# Patient Record
Sex: FEMALE | Race: WHITE | Employment: OTHER | URBAN - METROPOLITAN AREA
[De-identification: names, ages, dates, MRNs, and addresses within clinical notes are randomized per-mention and may not be internally consistent; named-entity substitution may affect disease eponyms.]

---

## 2017-01-03 ENCOUNTER — TELEPHONE (OUTPATIENT)
Dept: HEMATOLOGY/ONCOLOGY | Facility: HOSPITAL | Age: 67
End: 2017-01-03

## 2017-01-03 NOTE — TELEPHONE ENCOUNTER
Pt is having a scan on Friday. She has a follow up and chemo appt on Tuesday   She has an out of town gathering the following weekend. She would like to delay her chemo ideally 2 weeks but at least 1 week if that is OK with Dr. Evon Berry.

## 2017-01-06 ENCOUNTER — HOSPITAL ENCOUNTER (OUTPATIENT)
Dept: CT IMAGING | Facility: HOSPITAL | Age: 67
Discharge: HOME | End: 2017-01-06
Attending: INTERNAL MEDICINE
Payer: COMMERCIAL

## 2017-01-06 DIAGNOSIS — C78.7 SECONDARY ADENOCARCINOMA OF LIVER (HCC): ICD-10-CM

## 2017-01-06 DIAGNOSIS — C25.0 MALIGNANT NEOPLASM OF HEAD OF PANCREAS (HCC): ICD-10-CM

## 2017-01-06 PROCEDURE — 74177 CT ABD & PELVIS W/CONTRAST: CPT

## 2017-01-06 PROCEDURE — 71260 CT THORAX DX C+: CPT

## 2017-01-10 ENCOUNTER — OFFICE VISIT (OUTPATIENT)
Dept: HEMATOLOGY/ONCOLOGY | Facility: HOSPITAL | Age: 67
End: 2017-01-10
Attending: INTERNAL MEDICINE
Payer: COMMERCIAL

## 2017-01-10 VITALS
DIASTOLIC BLOOD PRESSURE: 92 MMHG | HEIGHT: 66.58 IN | HEART RATE: 88 BPM | TEMPERATURE: 97 F | SYSTOLIC BLOOD PRESSURE: 154 MMHG | RESPIRATION RATE: 18 BRPM | BODY MASS INDEX: 24.83 KG/M2 | WEIGHT: 156.38 LBS

## 2017-01-10 DIAGNOSIS — C78.7 SECONDARY ADENOCARCINOMA OF LIVER (HCC): ICD-10-CM

## 2017-01-10 DIAGNOSIS — C25.0 MALIGNANT NEOPLASM OF HEAD OF PANCREAS (HCC): Primary | ICD-10-CM

## 2017-01-10 LAB
ALBUMIN SERPL-MCNC: 3.5 G/DL (ref 3.5–4.8)
ALP LIVER SERPL-CCNC: 175 U/L (ref 55–142)
ALT SERPL-CCNC: 30 U/L (ref 14–54)
AST SERPL-CCNC: 28 U/L (ref 15–41)
BASOPHILS # BLD AUTO: 0.04 X10(3) UL (ref 0–0.1)
BASOPHILS NFR BLD AUTO: 0.5 %
BILIRUB SERPL-MCNC: 0.3 MG/DL (ref 0.1–2)
BUN BLD-MCNC: 13 MG/DL (ref 8–20)
CALCIUM BLD-MCNC: 8.7 MG/DL (ref 8.3–10.3)
CARBOHYDRATE AG 19-9: 240.1 U/ML (ref 1.2–35)
CEA: 7.3 NG/ML (ref 0.5–5)
CHLORIDE: 105 MMOL/L (ref 101–111)
CO2: 26 MMOL/L (ref 22–32)
CREAT BLD-MCNC: 0.73 MG/DL (ref 0.55–1.02)
EOSINOPHIL # BLD AUTO: 0.03 X10(3) UL (ref 0–0.3)
EOSINOPHIL NFR BLD AUTO: 0.4 %
ERYTHROCYTE [DISTWIDTH] IN BLOOD BY AUTOMATED COUNT: 13.1 % (ref 11.5–16)
GLUCOSE BLD-MCNC: 125 MG/DL (ref 70–99)
HCT VFR BLD AUTO: 40.2 % (ref 34–50)
HGB BLD-MCNC: 13.3 G/DL (ref 12–16)
IMMATURE GRANULOCYTE COUNT: 0.05 X10(3) UL (ref 0–1)
IMMATURE GRANULOCYTE RATIO %: 0.6 %
LYMPHOCYTES # BLD AUTO: 2.18 X10(3) UL (ref 0.9–4)
LYMPHOCYTES NFR BLD AUTO: 26.7 %
M PROTEIN MFR SERPL ELPH: 7.2 G/DL (ref 6.1–8.3)
MCH RBC QN AUTO: 33.1 PG (ref 27–33.2)
MCHC RBC AUTO-ENTMCNC: 33.1 G/DL (ref 31–37)
MCV RBC AUTO: 100 FL (ref 81–100)
MONOCYTES # BLD AUTO: 0.56 X10(3) UL (ref 0.1–0.6)
MONOCYTES NFR BLD AUTO: 6.9 %
NEUTROPHIL ABS PRELIM: 5.31 X10 (3) UL (ref 1.3–6.7)
NEUTROPHILS # BLD AUTO: 5.31 X10(3) UL (ref 1.3–6.7)
NEUTROPHILS NFR BLD AUTO: 64.9 %
PLATELET # BLD AUTO: 153 10(3)UL (ref 150–450)
POTASSIUM SERPL-SCNC: 3.5 MMOL/L (ref 3.6–5.1)
RBC # BLD AUTO: 4.02 X10(6)UL (ref 3.8–5.1)
RED CELL DISTRIBUTION WIDTH-SD: 48.5 FL (ref 35.1–46.3)
SODIUM SERPL-SCNC: 139 MMOL/L (ref 136–144)
WBC # BLD AUTO: 8.2 X10(3) UL (ref 4–13)

## 2017-01-10 PROCEDURE — 86301 IMMUNOASSAY TUMOR CA 19-9: CPT

## 2017-01-10 PROCEDURE — 99214 OFFICE O/P EST MOD 30 MIN: CPT | Performed by: INTERNAL MEDICINE

## 2017-01-10 PROCEDURE — 85025 COMPLETE CBC W/AUTO DIFF WBC: CPT

## 2017-01-10 PROCEDURE — 80053 COMPREHEN METABOLIC PANEL: CPT

## 2017-01-10 PROCEDURE — 82378 CARCINOEMBRYONIC ANTIGEN: CPT

## 2017-01-10 PROCEDURE — 36591 DRAW BLOOD OFF VENOUS DEVICE: CPT

## 2017-01-10 RX ORDER — SODIUM CHLORIDE 0.9 % (FLUSH) 0.9 %
10 SYRINGE (ML) INJECTION ONCE
Status: COMPLETED | OUTPATIENT
Start: 2017-01-10 | End: 2017-01-10

## 2017-01-10 RX ADMIN — SODIUM CHLORIDE 0.9 % (FLUSH) 10 ML: 0.9 % SYRINGE (ML) INJECTION at 13:05:00

## 2017-01-10 NOTE — PROGRESS NOTES
Patient is here for MD visit only. Delaying chemo one week because pt is going out of town. Pt had a little more looser stools and stomach upset after last chemo tx. Mild fatigue but tolerable. Appetite is good. No current nausea or vomiting.  No mouth sore

## 2017-01-16 ENCOUNTER — DIETICIAN VISIT (OUTPATIENT)
Dept: HEMATOLOGY/ONCOLOGY | Facility: HOSPITAL | Age: 67
End: 2017-01-16

## 2017-01-16 NOTE — PROGRESS NOTES
Nutrition screen complete as triggered by [facility] dx of metastatic pancreatic cancer. Chart reviewed. Pt appears at a moderate to high nutrition risk at present. Noted wt loss of ~ 30 lbs from 2/10/16 to 12/1/16.  Noted pt had been receiving tx at [facility]

## 2017-01-20 ENCOUNTER — OFFICE VISIT (OUTPATIENT)
Dept: HEMATOLOGY/ONCOLOGY | Facility: HOSPITAL | Age: 67
End: 2017-01-20
Attending: INTERNAL MEDICINE
Payer: COMMERCIAL

## 2017-01-20 VITALS
WEIGHT: 161.63 LBS | OXYGEN SATURATION: 98 % | HEART RATE: 79 BPM | HEIGHT: 66.58 IN | DIASTOLIC BLOOD PRESSURE: 89 MMHG | RESPIRATION RATE: 20 BRPM | BODY MASS INDEX: 25.67 KG/M2 | TEMPERATURE: 98 F | SYSTOLIC BLOOD PRESSURE: 152 MMHG

## 2017-01-20 DIAGNOSIS — C25.0 MALIGNANT NEOPLASM OF HEAD OF PANCREAS (HCC): Primary | ICD-10-CM

## 2017-01-20 LAB
ALBUMIN SERPL-MCNC: 3.2 G/DL (ref 3.5–4.8)
ALP LIVER SERPL-CCNC: 153 U/L (ref 55–142)
ALT SERPL-CCNC: 40 U/L (ref 14–54)
AST SERPL-CCNC: 37 U/L (ref 15–41)
BASOPHILS # BLD AUTO: 0.06 X10(3) UL (ref 0–0.1)
BASOPHILS NFR BLD AUTO: 1.9 %
BILIRUB SERPL-MCNC: 0.2 MG/DL (ref 0.1–2)
BUN BLD-MCNC: 18 MG/DL (ref 8–20)
CALCIUM BLD-MCNC: 8.8 MG/DL (ref 8.3–10.3)
CARBOHYDRATE AG 19-9: 215.9 U/ML (ref 1.2–35)
CEA: 5.3 NG/ML (ref 0.5–5)
CHLORIDE: 109 MMOL/L (ref 101–111)
CO2: 24 MMOL/L (ref 22–32)
CREAT BLD-MCNC: 0.6 MG/DL (ref 0.55–1.02)
EOSINOPHIL # BLD AUTO: 0.08 X10(3) UL (ref 0–0.3)
EOSINOPHIL NFR BLD AUTO: 2.6 %
ERYTHROCYTE [DISTWIDTH] IN BLOOD BY AUTOMATED COUNT: 13.4 % (ref 11.5–16)
GLUCOSE BLD-MCNC: 75 MG/DL (ref 70–99)
HCT VFR BLD AUTO: 38.5 % (ref 34–50)
HGB BLD-MCNC: 13.1 G/DL (ref 12–16)
IMMATURE GRANULOCYTE COUNT: 0.01 X10(3) UL (ref 0–1)
IMMATURE GRANULOCYTE RATIO %: 0.3 %
LYMPHOCYTES # BLD AUTO: 1.58 X10(3) UL (ref 0.9–4)
LYMPHOCYTES NFR BLD AUTO: 50.5 %
M PROTEIN MFR SERPL ELPH: 6.5 G/DL (ref 6.1–8.3)
MCH RBC QN AUTO: 33.3 PG (ref 27–33.2)
MCHC RBC AUTO-ENTMCNC: 34 G/DL (ref 31–37)
MCV RBC AUTO: 98 FL (ref 81–100)
MONOCYTES # BLD AUTO: 0.38 X10(3) UL (ref 0.1–0.6)
MONOCYTES NFR BLD AUTO: 12.1 %
NEUTROPHIL ABS PRELIM: 1.02 X10 (3) UL (ref 1.3–6.7)
NEUTROPHILS # BLD AUTO: 1.02 X10(3) UL (ref 1.3–6.7)
NEUTROPHILS NFR BLD AUTO: 32.6 %
PLATELET # BLD AUTO: 168 10(3)UL (ref 150–450)
POTASSIUM SERPL-SCNC: 4 MMOL/L (ref 3.6–5.1)
RBC # BLD AUTO: 3.93 X10(6)UL (ref 3.8–5.1)
RED CELL DISTRIBUTION WIDTH-SD: 49 FL (ref 35.1–46.3)
SODIUM SERPL-SCNC: 142 MMOL/L (ref 136–144)
WBC # BLD AUTO: 3.1 X10(3) UL (ref 4–13)

## 2017-01-20 PROCEDURE — 96415 CHEMO IV INFUSION ADDL HR: CPT

## 2017-01-20 PROCEDURE — 96416 CHEMO PROLONG INFUSE W/PUMP: CPT

## 2017-01-20 PROCEDURE — 85025 COMPLETE CBC W/AUTO DIFF WBC: CPT

## 2017-01-20 PROCEDURE — 96367 TX/PROPH/DG ADDL SEQ IV INF: CPT

## 2017-01-20 PROCEDURE — 80053 COMPREHEN METABOLIC PANEL: CPT

## 2017-01-20 PROCEDURE — 86301 IMMUNOASSAY TUMOR CA 19-9: CPT

## 2017-01-20 PROCEDURE — 82378 CARCINOEMBRYONIC ANTIGEN: CPT

## 2017-01-20 PROCEDURE — 96375 TX/PRO/DX INJ NEW DRUG ADDON: CPT

## 2017-01-20 PROCEDURE — 96368 THER/DIAG CONCURRENT INF: CPT

## 2017-01-20 PROCEDURE — 96417 CHEMO IV INFUS EACH ADDL SEQ: CPT

## 2017-01-20 PROCEDURE — 96413 CHEMO IV INFUSION 1 HR: CPT

## 2017-01-20 RX ORDER — FLUOROURACIL 50 MG/ML
1500 INJECTION, SOLUTION INTRAVENOUS CONTINUOUS
Status: DISCONTINUED | OUTPATIENT
Start: 2017-01-20 | End: 2017-01-20

## 2017-01-20 RX ORDER — ATROPINE SULFATE 0.4 MG/ML
0.2 AMPUL (ML) INJECTION ONCE
Status: COMPLETED | OUTPATIENT
Start: 2017-01-20 | End: 2017-01-20

## 2017-01-20 RX ADMIN — FLUOROURACIL 2750 MG: 50 INJECTION, SOLUTION INTRAVENOUS at 12:30:00

## 2017-01-20 RX ADMIN — ATROPINE SULFATE 0.2 MG: 0.4 MG/ML AMPUL (ML) INJECTION at 09:55:00

## 2017-01-20 NOTE — PATIENT INSTRUCTIONS
Education Record    Learner:  Patient and     Disease / Diagnosis:PANCREATIC CANCER    Barriers / Limitations:  None              Comments:    Method:  Brief focused and Reinforcement              Comments:    General Topics:  Medication, Side effec

## 2017-01-22 ENCOUNTER — OFFICE VISIT (OUTPATIENT)
Dept: HEMATOLOGY/ONCOLOGY | Facility: HOSPITAL | Age: 67
End: 2017-01-22
Attending: INTERNAL MEDICINE
Payer: COMMERCIAL

## 2017-01-22 DIAGNOSIS — C25.0 MALIGNANT NEOPLASM OF HEAD OF PANCREAS (HCC): Primary | ICD-10-CM

## 2017-01-22 PROCEDURE — 96523 IRRIG DRUG DELIVERY DEVICE: CPT

## 2017-01-22 RX ORDER — SODIUM CHLORIDE 0.9 % (FLUSH) 0.9 %
10 SYRINGE (ML) INJECTION ONCE
Status: COMPLETED | OUTPATIENT
Start: 2017-01-22 | End: 2017-01-22

## 2017-01-22 RX ADMIN — SODIUM CHLORIDE 0.9 % (FLUSH) 10 ML: 0.9 % SYRINGE (ML) INJECTION at 09:48:00

## 2017-01-22 NOTE — PROGRESS NOTES
Pt states her pump beeped and showed it was stopped. She checked clamps and called the number on the side of the pump. She states it did it again. When her pump came back on it showed the original reservoir volume.   The volume that was infused was kumar

## 2017-01-23 ENCOUNTER — SOCIAL WORK SERVICES (OUTPATIENT)
Dept: HEMATOLOGY/ONCOLOGY | Facility: HOSPITAL | Age: 67
End: 2017-01-23

## 2017-01-23 NOTE — PROGRESS NOTES
[facility]    PATIENT'S NAME: Irene Arredondo   ATTENDING PHYSICIAN: Nathalie Nielson M.D.    PATIENT ACCOUNT #: [de-identified] LOCATION: [address] [place] [address] RECORD #: [id number] YOB: 1950   DATE OF SERVICE: 01/10/2017       CANCER well-developed, well-nourished female in no acute distress. VITAL SIGNS:  Her performance status is 1. Her weight is 156 pounds. Blood pressure is 154/92, pulse 88, respiratory rate 20, temperature is 97.0. HEENT:  Unremarkable.   She has pink conj Courtney Best

## 2017-02-07 ENCOUNTER — OFFICE VISIT (OUTPATIENT)
Dept: HEMATOLOGY/ONCOLOGY | Facility: HOSPITAL | Age: 67
End: 2017-02-07
Attending: INTERNAL MEDICINE
Payer: COMMERCIAL

## 2017-02-07 ENCOUNTER — SOCIAL WORK SERVICES (OUTPATIENT)
Dept: HEMATOLOGY/ONCOLOGY | Facility: HOSPITAL | Age: 67
End: 2017-02-07

## 2017-02-07 VITALS
OXYGEN SATURATION: 99 % | HEIGHT: 66.58 IN | RESPIRATION RATE: 20 BRPM | HEART RATE: 75 BPM | BODY MASS INDEX: 25.65 KG/M2 | DIASTOLIC BLOOD PRESSURE: 91 MMHG | SYSTOLIC BLOOD PRESSURE: 142 MMHG | TEMPERATURE: 98 F | WEIGHT: 161.5 LBS

## 2017-02-07 DIAGNOSIS — C78.7 SECONDARY ADENOCARCINOMA OF LIVER (HCC): ICD-10-CM

## 2017-02-07 DIAGNOSIS — C25.0 MALIGNANT NEOPLASM OF HEAD OF PANCREAS (HCC): Primary | ICD-10-CM

## 2017-02-07 LAB
ALBUMIN SERPL-MCNC: 3.2 G/DL (ref 3.5–4.8)
ALP LIVER SERPL-CCNC: 176 U/L (ref 55–142)
ALT SERPL-CCNC: 42 U/L (ref 14–54)
AST SERPL-CCNC: 49 U/L (ref 15–41)
BASOPHILS # BLD AUTO: 0.05 X10(3) UL (ref 0–0.1)
BASOPHILS NFR BLD AUTO: 1.5 %
BILIRUB SERPL-MCNC: 0.2 MG/DL (ref 0.1–2)
BUN BLD-MCNC: 18 MG/DL (ref 8–20)
CALCIUM BLD-MCNC: 9.1 MG/DL (ref 8.3–10.3)
CARBOHYDRATE AG 19-9: 193.1 U/ML (ref 1.2–35)
CEA: 6.8 NG/ML (ref 0.5–5)
CHLORIDE: 107 MMOL/L (ref 101–111)
CO2: 27 MMOL/L (ref 22–32)
CREAT BLD-MCNC: 0.66 MG/DL (ref 0.55–1.02)
EOSINOPHIL # BLD AUTO: 0.09 X10(3) UL (ref 0–0.3)
EOSINOPHIL NFR BLD AUTO: 2.6 %
ERYTHROCYTE [DISTWIDTH] IN BLOOD BY AUTOMATED COUNT: 13.6 % (ref 11.5–16)
GLUCOSE BLD-MCNC: 91 MG/DL (ref 70–99)
HCT VFR BLD AUTO: 38.9 % (ref 34–50)
HGB BLD-MCNC: 13.3 G/DL (ref 12–16)
IMMATURE GRANULOCYTE COUNT: 0.01 X10(3) UL (ref 0–1)
IMMATURE GRANULOCYTE RATIO %: 0.3 %
LYMPHOCYTES # BLD AUTO: 1.77 X10(3) UL (ref 0.9–4)
LYMPHOCYTES NFR BLD AUTO: 51.8 %
M PROTEIN MFR SERPL ELPH: 6.6 G/DL (ref 6.1–8.3)
MCH RBC QN AUTO: 33.5 PG (ref 27–33.2)
MCHC RBC AUTO-ENTMCNC: 34.2 G/DL (ref 31–37)
MCV RBC AUTO: 98 FL (ref 81–100)
MONOCYTES # BLD AUTO: 0.48 X10(3) UL (ref 0.1–0.6)
MONOCYTES NFR BLD AUTO: 14 %
NEUTROPHIL ABS PRELIM: 1.02 X10 (3) UL (ref 1.3–6.7)
NEUTROPHILS # BLD AUTO: 1.02 X10(3) UL (ref 1.3–6.7)
NEUTROPHILS NFR BLD AUTO: 29.8 %
PLATELET # BLD AUTO: 135 10(3)UL (ref 150–450)
POTASSIUM SERPL-SCNC: 3.8 MMOL/L (ref 3.6–5.1)
RBC # BLD AUTO: 3.97 X10(6)UL (ref 3.8–5.1)
RED CELL DISTRIBUTION WIDTH-SD: 48.8 FL (ref 35.1–46.3)
SODIUM SERPL-SCNC: 141 MMOL/L (ref 136–144)
TSI SER-ACNC: 1.3 MIU/ML (ref 0.35–5.5)
WBC # BLD AUTO: 3.4 X10(3) UL (ref 4–13)

## 2017-02-07 PROCEDURE — 96375 TX/PRO/DX INJ NEW DRUG ADDON: CPT

## 2017-02-07 PROCEDURE — 99214 OFFICE O/P EST MOD 30 MIN: CPT | Performed by: INTERNAL MEDICINE

## 2017-02-07 PROCEDURE — 96368 THER/DIAG CONCURRENT INF: CPT

## 2017-02-07 PROCEDURE — 80053 COMPREHEN METABOLIC PANEL: CPT

## 2017-02-07 PROCEDURE — 96413 CHEMO IV INFUSION 1 HR: CPT

## 2017-02-07 PROCEDURE — 85025 COMPLETE CBC W/AUTO DIFF WBC: CPT

## 2017-02-07 PROCEDURE — 84443 ASSAY THYROID STIM HORMONE: CPT

## 2017-02-07 PROCEDURE — 82378 CARCINOEMBRYONIC ANTIGEN: CPT

## 2017-02-07 PROCEDURE — 96411 CHEMO IV PUSH ADDL DRUG: CPT

## 2017-02-07 PROCEDURE — 86301 IMMUNOASSAY TUMOR CA 19-9: CPT

## 2017-02-07 RX ORDER — FLUOROURACIL 50 MG/ML
1500 INJECTION, SOLUTION INTRAVENOUS CONTINUOUS
Status: DISCONTINUED | OUTPATIENT
Start: 2017-02-07 | End: 2017-02-07

## 2017-02-07 RX ORDER — FLUOXETINE 20 MG/1
TABLET, FILM COATED ORAL
COMMUNITY
Start: 2017-01-30 | End: 2017-03-21

## 2017-02-07 RX ORDER — ATROPINE SULFATE 0.4 MG/ML
0.2 AMPUL (ML) INJECTION ONCE
Status: COMPLETED | OUTPATIENT
Start: 2017-02-07 | End: 2017-02-07

## 2017-02-07 RX ORDER — FLUOXETINE 20 MG/1
TABLET, FILM COATED ORAL
Refills: 1 | COMMUNITY
Start: 2017-01-30 | End: 2017-02-07

## 2017-02-07 RX ORDER — METOPROLOL SUCCINATE 25 MG/1
TABLET, EXTENDED RELEASE ORAL
COMMUNITY
Start: 2017-01-30 | End: 2017-03-21

## 2017-02-07 RX ADMIN — ATROPINE SULFATE 0.2 MG: 0.4 MG/ML AMPUL (ML) INJECTION at 12:17:00

## 2017-02-07 RX ADMIN — FLUOROURACIL 2750 MG: 50 INJECTION, SOLUTION INTRAVENOUS at 13:55:00

## 2017-02-07 NOTE — PROGRESS NOTES
Education Record    Learner:  Patient    Disease / Kailey Males cancer    Barriers / Limitations:  None   Comments:    Method:  Discussion   Comments:    General Topics:  Medication, procedure, side effects, plan of care.    Comments:    Outcome:  Michelle

## 2017-02-07 NOTE — PROGRESS NOTES
Pt is here for cycle 4 of chemo. C/o mild neuropathy and tingling in both hands. Appetite is good. No nausea or vomiting. BM are normal. Energy level is good. Saw PCP on Friday and would like patient to have a TSH with other labs today.

## 2017-02-07 NOTE — PROGRESS NOTES
BLAS met with patient. She questioned an outstanding $1434.28 charge. BLAS spoke to Dwight Selby who reports that this is the patient's responsibility -- [employer] paid $32031.22, and she has Medicare A only. BLAS called patient to advise, and she expressed understanding.

## 2017-02-09 ENCOUNTER — OFFICE VISIT (OUTPATIENT)
Dept: HEMATOLOGY/ONCOLOGY | Facility: HOSPITAL | Age: 67
End: 2017-02-09
Attending: INTERNAL MEDICINE
Payer: COMMERCIAL

## 2017-02-09 DIAGNOSIS — C25.0 MALIGNANT NEOPLASM OF HEAD OF PANCREAS (HCC): Primary | ICD-10-CM

## 2017-02-09 PROCEDURE — 96372 THER/PROPH/DIAG INJ SC/IM: CPT

## 2017-02-09 RX ORDER — SODIUM CHLORIDE 0.9 % (FLUSH) 0.9 %
10 SYRINGE (ML) INJECTION ONCE
Status: COMPLETED | OUTPATIENT
Start: 2017-02-09 | End: 2017-02-09

## 2017-02-09 RX ADMIN — SODIUM CHLORIDE 0.9 % (FLUSH) 10 ML: 0.9 % SYRINGE (ML) INJECTION at 11:00:00

## 2017-02-09 NOTE — PROGRESS NOTES
Education Record    Learner:  Patient    Disease / Diagnosis:5fu pump d/c, neulasta    Barriers / Limitations:  None   Comments:    Method:  Discussion   Comments:    General Topics:  Medication, Side effects and symptom management and Plan of care reviewe

## 2017-02-14 NOTE — PROGRESS NOTES
[facility]    PATIENT'S NAME: Vikash Bingham   ATTENDING PHYSICIAN: Robe Barker M.D.    PATIENT ACCOUNT #: [de-identified] LOCATION: [address] [place] [address] RECORD #: [id number] YOB: 1950   DATE OF SERVICE: 02/07/2017       CANCER weight is 161 pounds, which is stable. Blood pressure is 142/91, pulse 75, respiratory rate 20, temperature 97.8. HEENT:  No oral lesions. She has pink conjunctivae, anicteric sclerae. Pharynx is without lesions.     LYMPHATICS:  She has no cervical, 08:42:43  [facility] [id number]  /    cc: Dr. Jonathan Montez

## 2017-02-28 ENCOUNTER — OFFICE VISIT (OUTPATIENT)
Dept: HEMATOLOGY/ONCOLOGY | Facility: HOSPITAL | Age: 67
End: 2017-02-28
Attending: NURSE PRACTITIONER
Payer: COMMERCIAL

## 2017-02-28 VITALS
HEART RATE: 70 BPM | HEIGHT: 66.58 IN | WEIGHT: 165 LBS | RESPIRATION RATE: 20 BRPM | OXYGEN SATURATION: 97 % | TEMPERATURE: 98 F | SYSTOLIC BLOOD PRESSURE: 145 MMHG | DIASTOLIC BLOOD PRESSURE: 89 MMHG | BODY MASS INDEX: 26.2 KG/M2

## 2017-02-28 DIAGNOSIS — C78.7 SECONDARY ADENOCARCINOMA OF LIVER (HCC): ICD-10-CM

## 2017-02-28 DIAGNOSIS — C25.0 MALIGNANT NEOPLASM OF HEAD OF PANCREAS (HCC): Primary | ICD-10-CM

## 2017-02-28 LAB
ALBUMIN SERPL-MCNC: 3.2 G/DL (ref 3.5–4.8)
ALP LIVER SERPL-CCNC: 172 U/L (ref 55–142)
ALT SERPL-CCNC: 43 U/L (ref 14–54)
AST SERPL-CCNC: 32 U/L (ref 15–41)
BASOPHILS # BLD AUTO: 0.03 X10(3) UL (ref 0–0.1)
BASOPHILS NFR BLD AUTO: 0.7 %
BILIRUB SERPL-MCNC: 0.3 MG/DL (ref 0.1–2)
BUN BLD-MCNC: 18 MG/DL (ref 8–20)
CALCIUM BLD-MCNC: 9.2 MG/DL (ref 8.3–10.3)
CARBOHYDRATE AG 19-9: 102.6 U/ML (ref 1.2–35)
CEA: 5.5 NG/ML (ref 0.5–5)
CHLORIDE: 106 MMOL/L (ref 101–111)
CO2: 26 MMOL/L (ref 22–32)
CREAT BLD-MCNC: 0.67 MG/DL (ref 0.55–1.02)
EOSINOPHIL # BLD AUTO: 0.05 X10(3) UL (ref 0–0.3)
EOSINOPHIL NFR BLD AUTO: 1.2 %
ERYTHROCYTE [DISTWIDTH] IN BLOOD BY AUTOMATED COUNT: 14.4 % (ref 11.5–16)
GLUCOSE BLD-MCNC: 84 MG/DL (ref 70–99)
HCT VFR BLD AUTO: 38.9 % (ref 34–50)
HGB BLD-MCNC: 13 G/DL (ref 12–16)
IMMATURE GRANULOCYTE COUNT: 0.01 X10(3) UL (ref 0–1)
IMMATURE GRANULOCYTE RATIO %: 0.2 %
LYMPHOCYTES # BLD AUTO: 1.99 X10(3) UL (ref 0.9–4)
LYMPHOCYTES NFR BLD AUTO: 48.9 %
M PROTEIN MFR SERPL ELPH: 6.8 G/DL (ref 6.1–8.3)
MCH RBC QN AUTO: 32.5 PG (ref 27–33.2)
MCHC RBC AUTO-ENTMCNC: 33.4 G/DL (ref 31–37)
MCV RBC AUTO: 97.3 FL (ref 81–100)
MONOCYTES # BLD AUTO: 0.47 X10(3) UL (ref 0.1–0.6)
MONOCYTES NFR BLD AUTO: 11.5 %
NEUTROPHIL ABS PRELIM: 1.52 X10 (3) UL (ref 1.3–6.7)
NEUTROPHILS # BLD AUTO: 1.52 X10(3) UL (ref 1.3–6.7)
NEUTROPHILS NFR BLD AUTO: 37.5 %
PLATELET # BLD AUTO: 176 10(3)UL (ref 150–450)
POTASSIUM SERPL-SCNC: 4 MMOL/L (ref 3.6–5.1)
RBC # BLD AUTO: 4 X10(6)UL (ref 3.8–5.1)
RED CELL DISTRIBUTION WIDTH-SD: 51.4 FL (ref 35.1–46.3)
SODIUM SERPL-SCNC: 139 MMOL/L (ref 136–144)
WBC # BLD AUTO: 4.1 X10(3) UL (ref 4–13)

## 2017-02-28 PROCEDURE — 96415 CHEMO IV INFUSION ADDL HR: CPT

## 2017-02-28 PROCEDURE — 80053 COMPREHEN METABOLIC PANEL: CPT

## 2017-02-28 PROCEDURE — 85025 COMPLETE CBC W/AUTO DIFF WBC: CPT

## 2017-02-28 PROCEDURE — 96417 CHEMO IV INFUS EACH ADDL SEQ: CPT

## 2017-02-28 PROCEDURE — 82378 CARCINOEMBRYONIC ANTIGEN: CPT

## 2017-02-28 PROCEDURE — 96411 CHEMO IV PUSH ADDL DRUG: CPT

## 2017-02-28 PROCEDURE — 86301 IMMUNOASSAY TUMOR CA 19-9: CPT

## 2017-02-28 PROCEDURE — 99213 OFFICE O/P EST LOW 20 MIN: CPT | Performed by: NURSE PRACTITIONER

## 2017-02-28 PROCEDURE — 96413 CHEMO IV INFUSION 1 HR: CPT

## 2017-02-28 PROCEDURE — 96416 CHEMO PROLONG INFUSE W/PUMP: CPT

## 2017-02-28 RX ORDER — ATROPINE SULFATE 0.4 MG/ML
0.2 AMPUL (ML) INJECTION ONCE
Status: COMPLETED | OUTPATIENT
Start: 2017-02-28 | End: 2017-02-28

## 2017-02-28 RX ORDER — FLUOROURACIL 50 MG/ML
1500 INJECTION, SOLUTION INTRAVENOUS CONTINUOUS
Status: DISCONTINUED | OUTPATIENT
Start: 2017-02-28 | End: 2017-02-28

## 2017-02-28 RX ADMIN — FLUOROURACIL 2750 MG: 50 INJECTION, SOLUTION INTRAVENOUS at 13:00:00

## 2017-02-28 RX ADMIN — ATROPINE SULFATE 0.2 MG: 0.4 MG/ML AMPUL (ML) INJECTION at 12:12:00

## 2017-03-02 ENCOUNTER — OFFICE VISIT (OUTPATIENT)
Dept: HEMATOLOGY/ONCOLOGY | Facility: HOSPITAL | Age: 67
End: 2017-03-02
Attending: NURSE PRACTITIONER
Payer: COMMERCIAL

## 2017-03-02 DIAGNOSIS — C25.0 MALIGNANT NEOPLASM OF HEAD OF PANCREAS (HCC): Primary | ICD-10-CM

## 2017-03-02 PROCEDURE — 96372 THER/PROPH/DIAG INJ SC/IM: CPT

## 2017-03-02 RX ORDER — SODIUM CHLORIDE 0.9 % (FLUSH) 0.9 %
10 SYRINGE (ML) INJECTION ONCE
Status: COMPLETED | OUTPATIENT
Start: 2017-03-02 | End: 2017-03-02

## 2017-03-02 RX ADMIN — SODIUM CHLORIDE 0.9 % (FLUSH) 10 ML: 0.9 % SYRINGE (ML) INJECTION at 11:00:00

## 2017-03-20 ENCOUNTER — TELEPHONE (OUTPATIENT)
Dept: HEMATOLOGY/ONCOLOGY | Facility: HOSPITAL | Age: 67
End: 2017-03-20

## 2017-03-20 NOTE — TELEPHONE ENCOUNTER
Patient believes she is supposed to patricia coming into office tomorrow for MD visit and chemo. Never received appt sheet last time she was in treatment room. Currently has no appts scheduled. She is due tomorrow.   Message sent to Dr. Johanna Todd and he will se t

## 2017-03-21 ENCOUNTER — OFFICE VISIT (OUTPATIENT)
Dept: HEMATOLOGY/ONCOLOGY | Facility: HOSPITAL | Age: 67
End: 2017-03-21
Attending: NURSE PRACTITIONER
Payer: COMMERCIAL

## 2017-03-21 VITALS
SYSTOLIC BLOOD PRESSURE: 147 MMHG | BODY MASS INDEX: 26.68 KG/M2 | HEIGHT: 65.95 IN | RESPIRATION RATE: 18 BRPM | TEMPERATURE: 98 F | WEIGHT: 166 LBS | HEART RATE: 73 BPM | DIASTOLIC BLOOD PRESSURE: 93 MMHG

## 2017-03-21 DIAGNOSIS — C78.7 SECONDARY ADENOCARCINOMA OF LIVER (HCC): ICD-10-CM

## 2017-03-21 DIAGNOSIS — C25.0 MALIGNANT NEOPLASM OF HEAD OF PANCREAS (HCC): Primary | ICD-10-CM

## 2017-03-21 LAB
ALBUMIN SERPL-MCNC: 3.5 G/DL (ref 3.5–4.8)
ALP LIVER SERPL-CCNC: 147 U/L (ref 55–142)
ALT SERPL-CCNC: 29 U/L (ref 14–54)
AST SERPL-CCNC: 23 U/L (ref 15–41)
BASOPHILS # BLD AUTO: 0.06 X10(3) UL (ref 0–0.1)
BASOPHILS NFR BLD AUTO: 1 %
BILIRUB SERPL-MCNC: 0.4 MG/DL (ref 0.1–2)
BUN BLD-MCNC: 20 MG/DL (ref 8–20)
CALCIUM BLD-MCNC: 9.2 MG/DL (ref 8.3–10.3)
CARBOHYDRATE AG 19-9: 58.2 U/ML (ref 1.2–35)
CEA: 4.9 NG/ML (ref 0.5–5)
CHLORIDE: 102 MMOL/L (ref 101–111)
CO2: 26 MMOL/L (ref 22–32)
CREAT BLD-MCNC: 0.65 MG/DL (ref 0.55–1.02)
EOSINOPHIL # BLD AUTO: 0.1 X10(3) UL (ref 0–0.3)
EOSINOPHIL NFR BLD AUTO: 1.6 %
ERYTHROCYTE [DISTWIDTH] IN BLOOD BY AUTOMATED COUNT: 14.9 % (ref 11.5–16)
GLUCOSE BLD-MCNC: 84 MG/DL (ref 70–99)
HCT VFR BLD AUTO: 37.4 % (ref 34–50)
HGB BLD-MCNC: 12.7 G/DL (ref 12–16)
IMMATURE GRANULOCYTE COUNT: 0.01 X10(3) UL (ref 0–1)
IMMATURE GRANULOCYTE RATIO %: 0.2 %
LYMPHOCYTES # BLD AUTO: 2.14 X10(3) UL (ref 0.9–4)
LYMPHOCYTES NFR BLD AUTO: 34.6 %
M PROTEIN MFR SERPL ELPH: 6.6 G/DL (ref 6.1–8.3)
MCH RBC QN AUTO: 32.6 PG (ref 27–33.2)
MCHC RBC AUTO-ENTMCNC: 34 G/DL (ref 31–37)
MCV RBC AUTO: 95.9 FL (ref 81–100)
MONOCYTES # BLD AUTO: 0.46 X10(3) UL (ref 0.1–0.6)
MONOCYTES NFR BLD AUTO: 7.4 %
NEUTROPHIL ABS PRELIM: 3.41 X10 (3) UL (ref 1.3–6.7)
NEUTROPHILS # BLD AUTO: 3.41 X10(3) UL (ref 1.3–6.7)
NEUTROPHILS NFR BLD AUTO: 55.2 %
PLATELET # BLD AUTO: 202 10(3)UL (ref 150–450)
POTASSIUM SERPL-SCNC: 4 MMOL/L (ref 3.6–5.1)
RBC # BLD AUTO: 3.9 X10(6)UL (ref 3.8–5.1)
RED CELL DISTRIBUTION WIDTH-SD: 52 FL (ref 35.1–46.3)
SODIUM SERPL-SCNC: 138 MMOL/L (ref 136–144)
WBC # BLD AUTO: 6.2 X10(3) UL (ref 4–13)

## 2017-03-21 PROCEDURE — 99214 OFFICE O/P EST MOD 30 MIN: CPT | Performed by: INTERNAL MEDICINE

## 2017-03-21 PROCEDURE — 96376 TX/PRO/DX INJ SAME DRUG ADON: CPT

## 2017-03-21 PROCEDURE — 96368 THER/DIAG CONCURRENT INF: CPT

## 2017-03-21 PROCEDURE — 96416 CHEMO PROLONG INFUSE W/PUMP: CPT

## 2017-03-21 PROCEDURE — 86301 IMMUNOASSAY TUMOR CA 19-9: CPT

## 2017-03-21 PROCEDURE — 85025 COMPLETE CBC W/AUTO DIFF WBC: CPT

## 2017-03-21 PROCEDURE — 82378 CARCINOEMBRYONIC ANTIGEN: CPT

## 2017-03-21 PROCEDURE — 80053 COMPREHEN METABOLIC PANEL: CPT

## 2017-03-21 PROCEDURE — 96413 CHEMO IV INFUSION 1 HR: CPT

## 2017-03-21 PROCEDURE — 96366 THER/PROPH/DIAG IV INF ADDON: CPT

## 2017-03-21 RX ORDER — LEVOTHYROXINE SODIUM 0.07 MG/1
TABLET ORAL
COMMUNITY
Start: 2016-12-15 | End: 2017-06-23

## 2017-03-21 RX ORDER — FLUOROURACIL 50 MG/ML
1500 INJECTION, SOLUTION INTRAVENOUS CONTINUOUS
Status: DISCONTINUED | OUTPATIENT
Start: 2017-03-21 | End: 2017-03-21

## 2017-03-21 RX ORDER — FLUOXETINE 20 MG/1
TABLET, FILM COATED ORAL
COMMUNITY
Start: 2017-01-30 | End: 2017-04-11

## 2017-03-21 RX ORDER — LATANOPROST 50 UG/ML
1 SOLUTION/ DROPS OPHTHALMIC DAILY
COMMUNITY

## 2017-03-21 RX ORDER — METOPROLOL SUCCINATE 25 MG/1
TABLET, EXTENDED RELEASE ORAL
COMMUNITY
Start: 2017-01-30 | End: 2017-06-23

## 2017-03-21 RX ORDER — AMOXICILLIN 500 MG/1
TABLET, FILM COATED ORAL
Qty: 4 TABLET | Refills: 5 | Status: SHIPPED | OUTPATIENT
Start: 2017-03-21 | End: 2017-04-11

## 2017-03-21 RX ORDER — ATROPINE SULFATE 0.4 MG/ML
0.2 AMPUL (ML) INJECTION ONCE
Status: COMPLETED | OUTPATIENT
Start: 2017-03-21 | End: 2017-03-21

## 2017-03-21 RX ADMIN — FLUOROURACIL 2750 MG: 50 INJECTION, SOLUTION INTRAVENOUS at 16:35:00

## 2017-03-21 RX ADMIN — ATROPINE SULFATE 0.2 MG: 0.4 MG/ML AMPUL (ML) INJECTION at 14:47:00

## 2017-03-21 NOTE — PROGRESS NOTES
Patient is here for MD f/u and cycle 6 of chemo. Pt is feeling well. No GI complaints. Appetite and energy level is good. Inquiring about getting a dental cleaning while on chemo.

## 2017-03-23 ENCOUNTER — OFFICE VISIT (OUTPATIENT)
Dept: HEMATOLOGY/ONCOLOGY | Facility: HOSPITAL | Age: 67
End: 2017-03-23
Attending: NURSE PRACTITIONER
Payer: COMMERCIAL

## 2017-03-23 DIAGNOSIS — C25.0 MALIGNANT NEOPLASM OF HEAD OF PANCREAS (HCC): Primary | ICD-10-CM

## 2017-03-23 PROCEDURE — 96372 THER/PROPH/DIAG INJ SC/IM: CPT

## 2017-03-23 RX ORDER — SODIUM CHLORIDE 0.9 % (FLUSH) 0.9 %
10 SYRINGE (ML) INJECTION ONCE
Status: COMPLETED | OUTPATIENT
Start: 2017-03-23 | End: 2017-03-23

## 2017-03-23 RX ADMIN — SODIUM CHLORIDE 0.9 % (FLUSH) 10 ML: 0.9 % SYRINGE (ML) INJECTION at 14:15:00

## 2017-03-23 NOTE — PROGRESS NOTES
Education Record    Learner:  Patient    Disease / Diagnosis:5fu pump d/c, neulasta    Barriers / Limitations:  None   Comments:    Method:  Discussion and Reinforcement   Comments:    General Topics:  Diet, Infection, Medication, Side effects and symptom

## 2017-04-11 ENCOUNTER — OFFICE VISIT (OUTPATIENT)
Dept: HEMATOLOGY/ONCOLOGY | Facility: HOSPITAL | Age: 67
End: 2017-04-11
Attending: NURSE PRACTITIONER
Payer: COMMERCIAL

## 2017-04-11 VITALS
SYSTOLIC BLOOD PRESSURE: 113 MMHG | DIASTOLIC BLOOD PRESSURE: 80 MMHG | WEIGHT: 169.19 LBS | HEART RATE: 73 BPM | RESPIRATION RATE: 18 BRPM | OXYGEN SATURATION: 98 % | TEMPERATURE: 98 F | BODY MASS INDEX: 27.19 KG/M2 | HEIGHT: 65.95 IN

## 2017-04-11 DIAGNOSIS — C25.0 MALIGNANT NEOPLASM OF HEAD OF PANCREAS (HCC): Primary | ICD-10-CM

## 2017-04-11 DIAGNOSIS — C78.7 SECONDARY ADENOCARCINOMA OF LIVER (HCC): ICD-10-CM

## 2017-04-11 PROCEDURE — 96413 CHEMO IV INFUSION 1 HR: CPT

## 2017-04-11 PROCEDURE — 99214 OFFICE O/P EST MOD 30 MIN: CPT | Performed by: INTERNAL MEDICINE

## 2017-04-11 PROCEDURE — 80053 COMPREHEN METABOLIC PANEL: CPT

## 2017-04-11 PROCEDURE — 96368 THER/DIAG CONCURRENT INF: CPT

## 2017-04-11 PROCEDURE — 85025 COMPLETE CBC W/AUTO DIFF WBC: CPT

## 2017-04-11 PROCEDURE — 96416 CHEMO PROLONG INFUSE W/PUMP: CPT

## 2017-04-11 PROCEDURE — 86301 IMMUNOASSAY TUMOR CA 19-9: CPT

## 2017-04-11 PROCEDURE — 96375 TX/PRO/DX INJ NEW DRUG ADDON: CPT

## 2017-04-11 PROCEDURE — 82378 CARCINOEMBRYONIC ANTIGEN: CPT

## 2017-04-11 PROCEDURE — 96367 TX/PROPH/DG ADDL SEQ IV INF: CPT

## 2017-04-11 RX ORDER — ATROPINE SULFATE 0.4 MG/ML
0.2 AMPUL (ML) INJECTION ONCE
Status: COMPLETED | OUTPATIENT
Start: 2017-04-11 | End: 2017-04-11

## 2017-04-11 RX ORDER — FLUOROURACIL 50 MG/ML
1500 INJECTION, SOLUTION INTRAVENOUS CONTINUOUS
Status: DISCONTINUED | OUTPATIENT
Start: 2017-04-11 | End: 2017-04-11

## 2017-04-11 RX ORDER — FLUOXETINE 20 MG/1
TABLET, FILM COATED ORAL
COMMUNITY
Start: 2017-03-23 | End: 2018-09-14 | Stop reason: ALTCHOICE

## 2017-04-11 RX ADMIN — ATROPINE SULFATE 0.2 MG: 0.4 MG/ML AMPUL (ML) INJECTION at 11:00:00

## 2017-04-11 RX ADMIN — FLUOROURACIL 2750 MG: 50 INJECTION, SOLUTION INTRAVENOUS at 12:37:00

## 2017-04-11 NOTE — PROGRESS NOTES
Education Record    Learner:  Patient and Spouse    Disease / Diagnosis:    Barriers / Limitations:  None   Comments:    Method:  Brief focused, Discussion and Reinforcement   Comments:    General Topics:  Infection, Precautions, Side effects and symptom m

## 2017-04-11 NOTE — PROGRESS NOTES
Patient is here for cycle 7 of chemo. Pt is feeling well. Appetite and energy level have been good. Denies pain. No GI complaints. Pt has trouble sleeping the first day of chemo but no complaints there after.

## 2017-04-12 NOTE — PROGRESS NOTES
[facility]    PATIENT'S NAME: Brian Watts   ATTENDING PHYSICIAN: Regina Chang M.D.    PATIENT ACCOUNT #: [de-identified] LOCATION: [address] [place] [address] RECORD #: [id number] YOB: 1950   DATE OF SERVICE: 04/11/2017       TANESHA h. p.r.n., prochlorperazine 10 mg q.6 h. p.r.n. PHYSICAL EXAMINATION:    GENERAL:  She is a well-developed, well-nourished female in no acute distress. VITAL SIGNS:  Her performance status is 0. Her weight is 169 pounds.   Blood pressure is 113/80, pu M.D.  d: 04/12/2017 13:58:16  t: 04/12/2017 17:05:08  [facility] [id number]  /    cc: Dr. Cristóbal Sharp

## 2017-04-13 ENCOUNTER — OFFICE VISIT (OUTPATIENT)
Dept: HEMATOLOGY/ONCOLOGY | Facility: HOSPITAL | Age: 67
End: 2017-04-13
Attending: NURSE PRACTITIONER
Payer: COMMERCIAL

## 2017-04-13 DIAGNOSIS — C25.0 MALIGNANT NEOPLASM OF HEAD OF PANCREAS (HCC): Primary | ICD-10-CM

## 2017-04-13 PROCEDURE — 36591 DRAW BLOOD OFF VENOUS DEVICE: CPT

## 2017-04-13 PROCEDURE — 96372 THER/PROPH/DIAG INJ SC/IM: CPT

## 2017-04-13 RX ORDER — SODIUM CHLORIDE 0.9 % (FLUSH) 0.9 %
10 SYRINGE (ML) INJECTION ONCE
Status: COMPLETED | OUTPATIENT
Start: 2017-04-13 | End: 2017-04-13

## 2017-04-13 RX ADMIN — SODIUM CHLORIDE 0.9 % (FLUSH) 10 ML: 0.9 % SYRINGE (ML) INJECTION at 10:30:00

## 2017-04-25 ENCOUNTER — OFFICE VISIT (OUTPATIENT)
Dept: HEMATOLOGY/ONCOLOGY | Facility: HOSPITAL | Age: 67
End: 2017-04-25
Attending: NURSE PRACTITIONER
Payer: COMMERCIAL

## 2017-04-25 VITALS
SYSTOLIC BLOOD PRESSURE: 147 MMHG | TEMPERATURE: 98 F | BODY MASS INDEX: 27.8 KG/M2 | RESPIRATION RATE: 18 BRPM | HEIGHT: 65.95 IN | DIASTOLIC BLOOD PRESSURE: 71 MMHG | HEART RATE: 80 BPM | WEIGHT: 173 LBS | OXYGEN SATURATION: 98 %

## 2017-04-25 DIAGNOSIS — C78.7 SECONDARY ADENOCARCINOMA OF LIVER (HCC): ICD-10-CM

## 2017-04-25 DIAGNOSIS — C25.0 MALIGNANT NEOPLASM OF HEAD OF PANCREAS (HCC): Primary | ICD-10-CM

## 2017-04-27 NOTE — PROGRESS NOTES
This visit was an informal discussion with the patient and her daughter who is a pediatrician practicing in [place] - she came in to visit her mother and wanted to meet me and discuss her mother's care.   JAKI Kwon

## 2017-05-09 ENCOUNTER — OFFICE VISIT (OUTPATIENT)
Dept: HEMATOLOGY/ONCOLOGY | Facility: HOSPITAL | Age: 67
End: 2017-05-09
Attending: INTERNAL MEDICINE
Payer: COMMERCIAL

## 2017-05-09 VITALS
DIASTOLIC BLOOD PRESSURE: 91 MMHG | HEIGHT: 65.95 IN | HEART RATE: 74 BPM | OXYGEN SATURATION: 99 % | BODY MASS INDEX: 27.88 KG/M2 | WEIGHT: 173.5 LBS | RESPIRATION RATE: 18 BRPM | TEMPERATURE: 98 F | SYSTOLIC BLOOD PRESSURE: 155 MMHG

## 2017-05-09 DIAGNOSIS — C78.7 SECONDARY ADENOCARCINOMA OF LIVER (HCC): ICD-10-CM

## 2017-05-09 DIAGNOSIS — C25.0 MALIGNANT NEOPLASM OF HEAD OF PANCREAS (HCC): Primary | ICD-10-CM

## 2017-05-09 PROCEDURE — 80053 COMPREHEN METABOLIC PANEL: CPT

## 2017-05-09 PROCEDURE — 96368 THER/DIAG CONCURRENT INF: CPT

## 2017-05-09 PROCEDURE — 96413 CHEMO IV INFUSION 1 HR: CPT

## 2017-05-09 PROCEDURE — 82378 CARCINOEMBRYONIC ANTIGEN: CPT

## 2017-05-09 PROCEDURE — 85025 COMPLETE CBC W/AUTO DIFF WBC: CPT

## 2017-05-09 PROCEDURE — 96416 CHEMO PROLONG INFUSE W/PUMP: CPT

## 2017-05-09 PROCEDURE — 96367 TX/PROPH/DG ADDL SEQ IV INF: CPT

## 2017-05-09 PROCEDURE — 96375 TX/PRO/DX INJ NEW DRUG ADDON: CPT

## 2017-05-09 PROCEDURE — 86301 IMMUNOASSAY TUMOR CA 19-9: CPT

## 2017-05-09 PROCEDURE — 99214 OFFICE O/P EST MOD 30 MIN: CPT | Performed by: INTERNAL MEDICINE

## 2017-05-09 RX ORDER — LISINOPRIL 10 MG/1
TABLET ORAL
COMMUNITY
Start: 2017-04-17 | End: 2017-06-23

## 2017-05-09 RX ORDER — ATROPINE SULFATE 0.4 MG/ML
0.2 AMPUL (ML) INJECTION ONCE
Status: COMPLETED | OUTPATIENT
Start: 2017-05-09 | End: 2017-05-09

## 2017-05-09 RX ORDER — FLUOROURACIL 50 MG/ML
1500 INJECTION, SOLUTION INTRAVENOUS CONTINUOUS
Status: DISCONTINUED | OUTPATIENT
Start: 2017-05-09 | End: 2017-05-09

## 2017-05-09 RX ADMIN — FLUOROURACIL 2750 MG: 50 INJECTION, SOLUTION INTRAVENOUS at 12:32:00

## 2017-05-09 RX ADMIN — ATROPINE SULFATE 0.2 MG: 0.4 MG/ML AMPUL (ML) INJECTION at 10:52:00

## 2017-05-09 NOTE — PROGRESS NOTES
Education Record    Learner:  Patient    Disease / Diagnosis: Pancreatic Ca    Barriers / Limitations:  None   Comments:    Method:  Brief focused and Reinforcement   Comments:    General Topics:  Plan of care reviewed   Comments:    Outcome:  Shows unders

## 2017-05-09 NOTE — PROGRESS NOTES
Patient is here for MD f/mac and cycle 8 of chemo. First 3-4 days after pump d/c pt c/o nausea and fatigue but resolves quickly. States these symptoms don't stop her from doing anything. Appetite is good. Energy level is good. No GI complaints.

## 2017-05-11 ENCOUNTER — OFFICE VISIT (OUTPATIENT)
Dept: HEMATOLOGY/ONCOLOGY | Facility: HOSPITAL | Age: 67
End: 2017-05-11
Attending: INTERNAL MEDICINE
Payer: COMMERCIAL

## 2017-05-11 DIAGNOSIS — C25.0 MALIGNANT NEOPLASM OF HEAD OF PANCREAS (HCC): Primary | ICD-10-CM

## 2017-05-11 PROCEDURE — 96372 THER/PROPH/DIAG INJ SC/IM: CPT

## 2017-05-11 RX ORDER — SODIUM CHLORIDE 0.9 % (FLUSH) 0.9 %
10 SYRINGE (ML) INJECTION ONCE
Status: COMPLETED | OUTPATIENT
Start: 2017-05-11 | End: 2017-05-11

## 2017-05-11 RX ADMIN — SODIUM CHLORIDE 0.9 % (FLUSH) 10 ML: 0.9 % SYRINGE (ML) INJECTION at 11:00:00

## 2017-05-11 NOTE — PROGRESS NOTES
[facility]    PATIENT'S NAME: Phan Holland   ATTENDING PHYSICIAN: Kathi Gómez M.D.    PATIENT ACCOUNT #: [de-identified] LOCATION: [address] [place] [address] RECORD #: [id number] YOB: 1950   DATE OF SERVICE: 05/09/2017       TANESHA traveled a little bit.   Her current medications include fluoxetine 20 mg daily, latanoprost ophthalmic solution 1 drop both eyes daily, levothyroxine 75 mcg daily, lisinopril 10 mg daily, metoprolol 25 mg daily, ondansetron 8 mg q.8 h. p.r.n., prochlorpera Kasia Loomis M.D.  d: 05/10/2017 09:55:25  t: 05/10/2017 10:07:55  [facility] [id number]  /    cc: Dr. Katherine Mason

## 2017-05-30 ENCOUNTER — OFFICE VISIT (OUTPATIENT)
Dept: HEMATOLOGY/ONCOLOGY | Facility: HOSPITAL | Age: 67
End: 2017-05-30
Attending: INTERNAL MEDICINE
Payer: COMMERCIAL

## 2017-05-30 VITALS
HEIGHT: 66.73 IN | SYSTOLIC BLOOD PRESSURE: 162 MMHG | HEART RATE: 67 BPM | OXYGEN SATURATION: 98 % | BODY MASS INDEX: 27.92 KG/M2 | WEIGHT: 175.81 LBS | DIASTOLIC BLOOD PRESSURE: 93 MMHG | RESPIRATION RATE: 18 BRPM | TEMPERATURE: 97 F

## 2017-05-30 DIAGNOSIS — C78.7 SECONDARY ADENOCARCINOMA OF LIVER (HCC): ICD-10-CM

## 2017-05-30 DIAGNOSIS — C25.0 MALIGNANT NEOPLASM OF HEAD OF PANCREAS (HCC): Primary | ICD-10-CM

## 2017-05-30 PROCEDURE — 96413 CHEMO IV INFUSION 1 HR: CPT

## 2017-05-30 PROCEDURE — 82378 CARCINOEMBRYONIC ANTIGEN: CPT

## 2017-05-30 PROCEDURE — 80053 COMPREHEN METABOLIC PANEL: CPT

## 2017-05-30 PROCEDURE — 86301 IMMUNOASSAY TUMOR CA 19-9: CPT

## 2017-05-30 PROCEDURE — 96416 CHEMO PROLONG INFUSE W/PUMP: CPT

## 2017-05-30 PROCEDURE — 85025 COMPLETE CBC W/AUTO DIFF WBC: CPT

## 2017-05-30 PROCEDURE — 99214 OFFICE O/P EST MOD 30 MIN: CPT | Performed by: INTERNAL MEDICINE

## 2017-05-30 PROCEDURE — 96375 TX/PRO/DX INJ NEW DRUG ADDON: CPT

## 2017-05-30 RX ORDER — FLUOROURACIL 50 MG/ML
1500 INJECTION, SOLUTION INTRAVENOUS CONTINUOUS
Status: DISCONTINUED | OUTPATIENT
Start: 2017-05-30 | End: 2017-05-30

## 2017-05-30 RX ORDER — ATROPINE SULFATE 0.4 MG/ML
0.2 AMPUL (ML) INJECTION ONCE
Status: COMPLETED | OUTPATIENT
Start: 2017-05-30 | End: 2017-05-30

## 2017-05-30 RX ADMIN — ATROPINE SULFATE 0.2 MG: 0.4 MG/ML AMPUL (ML) INJECTION at 10:49:00

## 2017-05-30 RX ADMIN — FLUOROURACIL 2750 MG: 50 INJECTION, SOLUTION INTRAVENOUS at 12:35:00

## 2017-05-30 NOTE — PROGRESS NOTES
Patient is here for MD f/u and cycle 9 of chemo. Pt is feeling well. Denies any complaints. No GI complaints. Appetite and energy level is good.

## 2017-05-31 NOTE — PROGRESS NOTES
[facility]    PATIENT'S NAME: Tita Christian   ATTENDING PHYSICIAN: Flor Brantley M.D.    PATIENT ACCOUNT #: [de-identified] LOCATION: [address] [place] [address] RECORD #: [id number] YOB: 1950   DATE OF SERVICE: 05/30/2017       TANESHA Pharynx without lesions. LYMPHATICS:  She has no cervical, supraclavicular, or axillary adenopathy. LUNGS:  Resonant to percussion and clear to auscultation. No wheezing, rales, or rhonchi. HEART:  Regular S1 and S2, with no murmur or gallop.   ABDOMEN:

## 2017-06-01 ENCOUNTER — OFFICE VISIT (OUTPATIENT)
Dept: HEMATOLOGY/ONCOLOGY | Facility: HOSPITAL | Age: 67
End: 2017-06-01
Attending: INTERNAL MEDICINE
Payer: COMMERCIAL

## 2017-06-01 DIAGNOSIS — C25.0 MALIGNANT NEOPLASM OF HEAD OF PANCREAS (HCC): Primary | ICD-10-CM

## 2017-06-01 PROCEDURE — 96372 THER/PROPH/DIAG INJ SC/IM: CPT

## 2017-06-01 RX ORDER — SODIUM CHLORIDE 0.9 % (FLUSH) 0.9 %
10 SYRINGE (ML) INJECTION ONCE
Status: COMPLETED | OUTPATIENT
Start: 2017-06-01 | End: 2017-06-01

## 2017-06-01 RX ADMIN — SODIUM CHLORIDE 0.9 % (FLUSH) 10 ML: 0.9 % SYRINGE (ML) INJECTION at 11:15:00

## 2017-06-23 ENCOUNTER — OFFICE VISIT (OUTPATIENT)
Dept: HEMATOLOGY/ONCOLOGY | Facility: HOSPITAL | Age: 67
End: 2017-06-23
Attending: INTERNAL MEDICINE
Payer: COMMERCIAL

## 2017-06-23 VITALS
SYSTOLIC BLOOD PRESSURE: 148 MMHG | BODY MASS INDEX: 28.21 KG/M2 | HEIGHT: 66.73 IN | HEART RATE: 77 BPM | RESPIRATION RATE: 18 BRPM | DIASTOLIC BLOOD PRESSURE: 80 MMHG | TEMPERATURE: 97 F | OXYGEN SATURATION: 99 % | WEIGHT: 177.63 LBS

## 2017-06-23 DIAGNOSIS — C78.7 SECONDARY ADENOCARCINOMA OF LIVER (HCC): ICD-10-CM

## 2017-06-23 DIAGNOSIS — C25.0 MALIGNANT NEOPLASM OF HEAD OF PANCREAS (HCC): Primary | ICD-10-CM

## 2017-06-23 DIAGNOSIS — C25.0 MALIGNANT NEOPLASM OF HEAD OF PANCREAS (HCC): ICD-10-CM

## 2017-06-23 DIAGNOSIS — C25.9 MALIGNANT NEOPLASM OF PANCREAS, UNSPECIFIED LOCATION OF MALIGNANCY (HCC): Primary | ICD-10-CM

## 2017-06-23 PROCEDURE — 85025 COMPLETE CBC W/AUTO DIFF WBC: CPT

## 2017-06-23 PROCEDURE — 99214 OFFICE O/P EST MOD 30 MIN: CPT | Performed by: INTERNAL MEDICINE

## 2017-06-23 PROCEDURE — 96367 TX/PROPH/DG ADDL SEQ IV INF: CPT

## 2017-06-23 PROCEDURE — 96368 THER/DIAG CONCURRENT INF: CPT

## 2017-06-23 PROCEDURE — 96413 CHEMO IV INFUSION 1 HR: CPT

## 2017-06-23 PROCEDURE — 82378 CARCINOEMBRYONIC ANTIGEN: CPT

## 2017-06-23 PROCEDURE — 96375 TX/PRO/DX INJ NEW DRUG ADDON: CPT

## 2017-06-23 PROCEDURE — 80053 COMPREHEN METABOLIC PANEL: CPT

## 2017-06-23 PROCEDURE — 86301 IMMUNOASSAY TUMOR CA 19-9: CPT

## 2017-06-23 RX ORDER — METOPROLOL SUCCINATE 25 MG/1
25 TABLET, EXTENDED RELEASE ORAL DAILY
COMMUNITY
Start: 2017-05-30

## 2017-06-23 RX ORDER — FLUOROURACIL 50 MG/ML
1500 INJECTION, SOLUTION INTRAVENOUS CONTINUOUS
Status: DISCONTINUED | OUTPATIENT
Start: 2017-06-23 | End: 2017-06-23

## 2017-06-23 RX ORDER — LISINOPRIL 10 MG/1
10 TABLET ORAL
COMMUNITY
Start: 2017-06-13 | End: 2019-03-01

## 2017-06-23 RX ORDER — LEVOTHYROXINE SODIUM 0.07 MG/1
75 TABLET ORAL
COMMUNITY
Start: 2017-06-13 | End: 2017-09-15

## 2017-06-23 RX ORDER — ATROPINE SULFATE 0.4 MG/ML
0.2 AMPUL (ML) INJECTION ONCE
Status: COMPLETED | OUTPATIENT
Start: 2017-06-23 | End: 2017-06-23

## 2017-06-23 RX ADMIN — ATROPINE SULFATE 0.2 MG: 0.4 MG/ML AMPUL (ML) INJECTION at 10:37:00

## 2017-06-23 RX ADMIN — FLUOROURACIL 2750 MG: 50 INJECTION, SOLUTION INTRAVENOUS at 12:20:00

## 2017-06-23 NOTE — PROGRESS NOTES
Pt is here for cycle 10 of chemo. Pt reports low energy and overall not feeling well for a few days after chemo. After those few days, pt feels like herself again. Appetite and energy level is good. No GI complaints. Denies any concerns.  Asking if she coul

## 2017-06-25 ENCOUNTER — OFFICE VISIT (OUTPATIENT)
Dept: HEMATOLOGY/ONCOLOGY | Facility: HOSPITAL | Age: 67
End: 2017-06-25
Attending: INTERNAL MEDICINE
Payer: COMMERCIAL

## 2017-06-25 DIAGNOSIS — C25.0 MALIGNANT NEOPLASM OF HEAD OF PANCREAS (HCC): Primary | ICD-10-CM

## 2017-06-25 PROCEDURE — 96372 THER/PROPH/DIAG INJ SC/IM: CPT

## 2017-06-25 RX ORDER — SODIUM CHLORIDE 0.9 % (FLUSH) 0.9 %
10 SYRINGE (ML) INJECTION ONCE
Status: CANCELLED | OUTPATIENT
Start: 2017-06-25

## 2017-06-25 RX ORDER — SODIUM CHLORIDE 0.9 % (FLUSH) 0.9 %
10 SYRINGE (ML) INJECTION ONCE
Status: COMPLETED | OUTPATIENT
Start: 2017-06-25 | End: 2017-06-25

## 2017-06-25 RX ADMIN — SODIUM CHLORIDE 0.9 % (FLUSH) 10 ML: 0.9 % SYRINGE (ML) INJECTION at 10:15:00

## 2017-06-25 NOTE — PROGRESS NOTES
Pt arrived for pump dc and neulasta inj, pt states feeling well but having slight nausea, pt denies any need for antiemetic medication and states usually resolves by tomorrow, pt alert and appears in nad, pt ambulates with steady gait

## 2017-06-26 NOTE — PROGRESS NOTES
[facility]    PATIENT'S NAME: Mary Serrano   ATTENDING PHYSICIAN: Amarilys Gamez M.D.    PATIENT ACCOUNT #: [de-identified] LOCATION: [address] [place] [address] RECORD #: [id number] YOB: 1950   DATE OF SERVICE: 06/23/2017       TANESHA tenderness. EXTREMITIES:  She has no clubbing, cyanosis, or edema. NEUROLOGIC:  She is intact. LABORATORY DATA:  Her white count is 4.0, hemoglobin 13.1, platelets 325. Her chemistries are completely normal with the exception of potassium of 3.4.   H

## 2017-06-29 ENCOUNTER — HOSPITAL ENCOUNTER (OUTPATIENT)
Dept: CT IMAGING | Facility: HOSPITAL | Age: 67
Discharge: HOME | End: 2017-06-29
Attending: INTERNAL MEDICINE
Payer: COMMERCIAL

## 2017-06-29 DIAGNOSIS — C25.9 MALIGNANT NEOPLASM OF PANCREAS, UNSPECIFIED LOCATION OF MALIGNANCY (HCC): ICD-10-CM

## 2017-06-29 PROCEDURE — 71260 CT THORAX DX C+: CPT | Performed by: INTERNAL MEDICINE

## 2017-06-29 PROCEDURE — 74177 CT ABD & PELVIS W/CONTRAST: CPT | Performed by: INTERNAL MEDICINE

## 2017-07-14 ENCOUNTER — OFFICE VISIT (OUTPATIENT)
Dept: HEMATOLOGY/ONCOLOGY | Facility: HOSPITAL | Age: 67
End: 2017-07-14
Attending: INTERNAL MEDICINE
Payer: MEDICARE

## 2017-07-14 VITALS
HEART RATE: 69 BPM | BODY MASS INDEX: 28.62 KG/M2 | WEIGHT: 180.19 LBS | HEIGHT: 66.73 IN | TEMPERATURE: 97 F | RESPIRATION RATE: 18 BRPM | OXYGEN SATURATION: 98 % | SYSTOLIC BLOOD PRESSURE: 142 MMHG | DIASTOLIC BLOOD PRESSURE: 75 MMHG

## 2017-07-14 DIAGNOSIS — C25.0 MALIGNANT NEOPLASM OF HEAD OF PANCREAS (HCC): Primary | ICD-10-CM

## 2017-07-14 DIAGNOSIS — C25.0 MALIGNANT NEOPLASM OF HEAD OF PANCREAS (HCC): ICD-10-CM

## 2017-07-14 LAB
ALBUMIN SERPL-MCNC: 3.4 G/DL (ref 3.5–4.8)
ALP LIVER SERPL-CCNC: 129 U/L (ref 55–142)
ALT SERPL-CCNC: 35 U/L (ref 14–54)
AST SERPL-CCNC: 21 U/L (ref 15–41)
BASOPHILS # BLD AUTO: 0.07 X10(3) UL (ref 0–0.1)
BASOPHILS NFR BLD AUTO: 1.9 %
BILIRUB SERPL-MCNC: 0.4 MG/DL (ref 0.1–2)
BUN BLD-MCNC: 25 MG/DL (ref 8–20)
CALCIUM BLD-MCNC: 9 MG/DL (ref 8.3–10.3)
CARBOHYDRATE AG 19-9: 33 U/ML (ref 1.2–35)
CEA: 4.6 NG/ML (ref 0.5–5)
CHLORIDE: 108 MMOL/L (ref 101–111)
CO2: 26 MMOL/L (ref 22–32)
CREAT BLD-MCNC: 0.73 MG/DL (ref 0.55–1.02)
EOSINOPHIL # BLD AUTO: 0.24 X10(3) UL (ref 0–0.3)
EOSINOPHIL NFR BLD AUTO: 6.6 %
ERYTHROCYTE [DISTWIDTH] IN BLOOD BY AUTOMATED COUNT: 13.2 % (ref 11.5–16)
GLUCOSE BLD-MCNC: 101 MG/DL (ref 70–99)
HCT VFR BLD AUTO: 37.5 % (ref 34–50)
HGB BLD-MCNC: 12.2 G/DL (ref 12–16)
IMMATURE GRANULOCYTE COUNT: 0 X10(3) UL (ref 0–1)
IMMATURE GRANULOCYTE RATIO %: 0 %
LYMPHOCYTES # BLD AUTO: 1.33 X10(3) UL (ref 0.9–4)
LYMPHOCYTES NFR BLD AUTO: 36.5 %
M PROTEIN MFR SERPL ELPH: 6.4 G/DL (ref 6.1–8.3)
MCH RBC QN AUTO: 31.7 PG (ref 27–33.2)
MCHC RBC AUTO-ENTMCNC: 32.5 G/DL (ref 31–37)
MCV RBC AUTO: 97.4 FL (ref 81–100)
MONOCYTES # BLD AUTO: 0.33 X10(3) UL (ref 0.1–0.6)
MONOCYTES NFR BLD AUTO: 9.1 %
NEUTROPHIL ABS PRELIM: 1.67 X10 (3) UL (ref 1.3–6.7)
NEUTROPHILS # BLD AUTO: 1.67 X10(3) UL (ref 1.3–6.7)
NEUTROPHILS NFR BLD AUTO: 45.9 %
PLATELET # BLD AUTO: 209 10(3)UL (ref 150–450)
POTASSIUM SERPL-SCNC: 4 MMOL/L (ref 3.6–5.1)
RBC # BLD AUTO: 3.85 X10(6)UL (ref 3.8–5.1)
RED CELL DISTRIBUTION WIDTH-SD: 47.4 FL (ref 35.1–46.3)
SODIUM SERPL-SCNC: 142 MMOL/L (ref 136–144)
WBC # BLD AUTO: 3.6 X10(3) UL (ref 4–13)

## 2017-07-14 PROCEDURE — 80053 COMPREHEN METABOLIC PANEL: CPT

## 2017-07-14 PROCEDURE — 85025 COMPLETE CBC W/AUTO DIFF WBC: CPT

## 2017-07-14 PROCEDURE — 96368 THER/DIAG CONCURRENT INF: CPT

## 2017-07-14 PROCEDURE — 96375 TX/PRO/DX INJ NEW DRUG ADDON: CPT

## 2017-07-14 PROCEDURE — 99214 OFFICE O/P EST MOD 30 MIN: CPT | Performed by: NURSE PRACTITIONER

## 2017-07-14 PROCEDURE — 86301 IMMUNOASSAY TUMOR CA 19-9: CPT

## 2017-07-14 PROCEDURE — 82378 CARCINOEMBRYONIC ANTIGEN: CPT

## 2017-07-14 PROCEDURE — 96413 CHEMO IV INFUSION 1 HR: CPT

## 2017-07-14 RX ORDER — ONDANSETRON HYDROCHLORIDE 8 MG/1
8 TABLET, FILM COATED ORAL EVERY 8 HOURS PRN
Qty: 30 TABLET | Refills: 3 | Status: SHIPPED | OUTPATIENT
Start: 2017-07-14 | End: 2019-01-01

## 2017-07-14 RX ORDER — ATROPINE SULFATE 0.4 MG/ML
0.2 AMPUL (ML) INJECTION ONCE
Status: CANCELLED | OUTPATIENT
Start: 2017-07-14

## 2017-07-14 RX ORDER — FLUOROURACIL 50 MG/ML
1500 INJECTION, SOLUTION INTRAVENOUS CONTINUOUS
Status: DISCONTINUED | OUTPATIENT
Start: 2017-07-14 | End: 2017-07-14

## 2017-07-14 RX ORDER — ATROPINE SULFATE 0.4 MG/ML
0.2 AMPUL (ML) INJECTION ONCE
Status: COMPLETED | OUTPATIENT
Start: 2017-07-14 | End: 2017-07-14

## 2017-07-14 RX ORDER — FLUOROURACIL 50 MG/ML
1500 INJECTION, SOLUTION INTRAVENOUS CONTINUOUS
Status: CANCELLED | OUTPATIENT
Start: 2017-07-14

## 2017-07-14 RX ORDER — PROCHLORPERAZINE MALEATE 10 MG
10 TABLET ORAL EVERY 6 HOURS PRN
Qty: 30 TABLET | Refills: 3 | Status: SHIPPED | OUTPATIENT
Start: 2017-07-14 | End: 2019-01-01

## 2017-07-14 RX ADMIN — ATROPINE SULFATE 0.2 MG: 0.4 MG/ML AMPUL (ML) INJECTION at 10:48:00

## 2017-07-14 RX ADMIN — FLUOROURACIL 2750 MG: 50 INJECTION, SOLUTION INTRAVENOUS at 12:35:00

## 2017-07-14 NOTE — PROGRESS NOTES
CC:Patient presents with:  Chemotherapy: cycle 11 of Leuco/5FU/Irinotecan       HPI:   Rashel Beach is a(n) 79year old female followed by Dr. Steven Sanchez for metastatic pancreatic cancer. She has completed 10 cycles of chemotherapy.   She was initiall ANTIGEN 19-9   Collection Time: 07/14/17  8:38 AM   Result Value Ref Range   Carbohydrate Ag 19-9 33.0 1.2 - 35.0 U/mL   -COMP METABOLIC PANEL (14)   Collection Time: 07/14/17  8:38 AM   Result Value Ref Range   Glucose 101 (H) 70 - 99 mg/dL   BUN 25 (H) 8 travel.       Patient instructions    Follow up:  Next cycle August 8 schedule around planned vacation    Decision making:complex     30   Minutes were spent with the patient and family,       90 % of that time was spent addressing the patients emotional we

## 2017-07-14 NOTE — PROGRESS NOTES
Patient is here for APN assessment and cycle 11 of Leuco/Irinotecan/5FU. Pt had a ct scan on June 29 and would like to discuss the results today. Going out of town in a few days, on pump d/c day, asking for a RX for antiemetics. Appetite is good.  Energy le

## 2017-07-16 ENCOUNTER — OFFICE VISIT (OUTPATIENT)
Dept: HEMATOLOGY/ONCOLOGY | Facility: HOSPITAL | Age: 67
End: 2017-07-16
Attending: INTERNAL MEDICINE
Payer: MEDICARE

## 2017-07-16 DIAGNOSIS — C25.0 MALIGNANT NEOPLASM OF HEAD OF PANCREAS (HCC): Primary | ICD-10-CM

## 2017-07-16 PROCEDURE — 96372 THER/PROPH/DIAG INJ SC/IM: CPT

## 2017-07-16 NOTE — PROGRESS NOTES
Education Record    Learner:  Patient    Disease / Jason Antonio    Barriers / Limitations:  None   Comments:    Method:  Brief focused and Printed material   Comments:    General Topics:  Medication, Side effects and symptom management and Plan of ca

## 2017-07-31 ENCOUNTER — SNF/IP PROF CHARGE ONLY (OUTPATIENT)
Dept: HEMATOLOGY/ONCOLOGY | Facility: HOSPITAL | Age: 67
End: 2017-07-31

## 2017-07-31 DIAGNOSIS — C25.0 MALIGNANT NEOPLASM OF HEAD OF PANCREAS (HCC): ICD-10-CM

## 2017-07-31 PROCEDURE — G9678 ONCOLOGY CARE MODEL SERVICE: HCPCS | Performed by: INTERNAL MEDICINE

## 2017-08-08 ENCOUNTER — OFFICE VISIT (OUTPATIENT)
Dept: HEMATOLOGY/ONCOLOGY | Facility: HOSPITAL | Age: 67
End: 2017-08-08
Attending: INTERNAL MEDICINE
Payer: MEDICARE

## 2017-08-08 VITALS
SYSTOLIC BLOOD PRESSURE: 156 MMHG | RESPIRATION RATE: 18 BRPM | TEMPERATURE: 97 F | BODY MASS INDEX: 28.71 KG/M2 | DIASTOLIC BLOOD PRESSURE: 79 MMHG | HEART RATE: 64 BPM | HEIGHT: 66.73 IN | OXYGEN SATURATION: 99 % | WEIGHT: 180.81 LBS

## 2017-08-08 DIAGNOSIS — C78.7 SECONDARY ADENOCARCINOMA OF LIVER (HCC): Primary | ICD-10-CM

## 2017-08-08 DIAGNOSIS — C25.0 MALIGNANT NEOPLASM OF HEAD OF PANCREAS (HCC): Primary | ICD-10-CM

## 2017-08-08 DIAGNOSIS — C25.0 MALIGNANT NEOPLASM OF HEAD OF PANCREAS (HCC): ICD-10-CM

## 2017-08-08 LAB
ALBUMIN SERPL-MCNC: 3.3 G/DL (ref 3.5–4.8)
ALP LIVER SERPL-CCNC: 125 U/L (ref 55–142)
ALT SERPL-CCNC: 27 U/L (ref 14–54)
AST SERPL-CCNC: 19 U/L (ref 15–41)
BASOPHILS # BLD AUTO: 0.06 X10(3) UL (ref 0–0.1)
BASOPHILS NFR BLD AUTO: 1.9 %
BILIRUB SERPL-MCNC: 0.3 MG/DL (ref 0.1–2)
BUN BLD-MCNC: 21 MG/DL (ref 8–20)
CALCIUM BLD-MCNC: 9 MG/DL (ref 8.3–10.3)
CARBOHYDRATE AG 19-9: 66.7 U/ML (ref 1.2–35)
CEA: 3.2 NG/ML (ref 0.5–5)
CHLORIDE: 107 MMOL/L (ref 101–111)
CO2: 27 MMOL/L (ref 22–32)
CREAT BLD-MCNC: 0.63 MG/DL (ref 0.55–1.02)
EOSINOPHIL # BLD AUTO: 0.15 X10(3) UL (ref 0–0.3)
EOSINOPHIL NFR BLD AUTO: 4.6 %
ERYTHROCYTE [DISTWIDTH] IN BLOOD BY AUTOMATED COUNT: 13.2 % (ref 11.5–16)
GLUCOSE BLD-MCNC: 81 MG/DL (ref 70–99)
HCT VFR BLD AUTO: 39.4 % (ref 34–50)
HGB BLD-MCNC: 13.1 G/DL (ref 12–16)
IMMATURE GRANULOCYTE COUNT: 0.01 X10(3) UL (ref 0–1)
IMMATURE GRANULOCYTE RATIO %: 0.3 %
LYMPHOCYTES # BLD AUTO: 1.36 X10(3) UL (ref 0.9–4)
LYMPHOCYTES NFR BLD AUTO: 42.1 %
M PROTEIN MFR SERPL ELPH: 6.5 G/DL (ref 6.1–8.3)
MCH RBC QN AUTO: 31.8 PG (ref 27–33.2)
MCHC RBC AUTO-ENTMCNC: 33.2 G/DL (ref 31–37)
MCV RBC AUTO: 95.6 FL (ref 81–100)
MONOCYTES # BLD AUTO: 0.32 X10(3) UL (ref 0.1–0.6)
MONOCYTES NFR BLD AUTO: 9.9 %
NEUTROPHIL ABS PRELIM: 1.33 X10 (3) UL (ref 1.3–6.7)
NEUTROPHILS # BLD AUTO: 1.33 X10(3) UL (ref 1.3–6.7)
NEUTROPHILS NFR BLD AUTO: 41.2 %
PLATELET # BLD AUTO: 265 10(3)UL (ref 150–450)
POTASSIUM SERPL-SCNC: 4.1 MMOL/L (ref 3.6–5.1)
RBC # BLD AUTO: 4.12 X10(6)UL (ref 3.8–5.1)
RED CELL DISTRIBUTION WIDTH-SD: 47.3 FL (ref 35.1–46.3)
SODIUM SERPL-SCNC: 142 MMOL/L (ref 136–144)
WBC # BLD AUTO: 3.2 X10(3) UL (ref 4–13)

## 2017-08-08 PROCEDURE — 96366 THER/PROPH/DIAG IV INF ADDON: CPT

## 2017-08-08 PROCEDURE — 85025 COMPLETE CBC W/AUTO DIFF WBC: CPT

## 2017-08-08 PROCEDURE — 86301 IMMUNOASSAY TUMOR CA 19-9: CPT

## 2017-08-08 PROCEDURE — 82378 CARCINOEMBRYONIC ANTIGEN: CPT

## 2017-08-08 PROCEDURE — 96375 TX/PRO/DX INJ NEW DRUG ADDON: CPT

## 2017-08-08 PROCEDURE — 99214 OFFICE O/P EST MOD 30 MIN: CPT | Performed by: INTERNAL MEDICINE

## 2017-08-08 PROCEDURE — 96417 CHEMO IV INFUS EACH ADDL SEQ: CPT

## 2017-08-08 PROCEDURE — 80053 COMPREHEN METABOLIC PANEL: CPT

## 2017-08-08 PROCEDURE — 96368 THER/DIAG CONCURRENT INF: CPT

## 2017-08-08 PROCEDURE — 96413 CHEMO IV INFUSION 1 HR: CPT

## 2017-08-08 RX ORDER — FLUOROURACIL 50 MG/ML
1500 INJECTION, SOLUTION INTRAVENOUS CONTINUOUS
Status: CANCELLED | OUTPATIENT
Start: 2017-08-08

## 2017-08-08 RX ORDER — METOCLOPRAMIDE HYDROCHLORIDE 5 MG/ML
10 INJECTION INTRAMUSCULAR; INTRAVENOUS EVERY 6 HOURS PRN
Status: CANCELLED | OUTPATIENT
Start: 2017-08-08

## 2017-08-08 RX ORDER — LORAZEPAM 2 MG/ML
INJECTION INTRAMUSCULAR EVERY 4 HOURS PRN
Status: CANCELLED | OUTPATIENT
Start: 2017-08-08

## 2017-08-08 RX ORDER — ONDANSETRON 2 MG/ML
8 INJECTION INTRAMUSCULAR; INTRAVENOUS EVERY 6 HOURS PRN
Status: CANCELLED | OUTPATIENT
Start: 2017-08-08

## 2017-08-08 RX ORDER — FLUOROURACIL 50 MG/ML
1500 INJECTION, SOLUTION INTRAVENOUS CONTINUOUS
Status: DISCONTINUED | OUTPATIENT
Start: 2017-08-08 | End: 2017-08-08

## 2017-08-08 RX ORDER — ATROPINE SULFATE 0.4 MG/ML
0.2 AMPUL (ML) INJECTION ONCE
Status: CANCELLED | OUTPATIENT
Start: 2017-08-08

## 2017-08-08 RX ORDER — SODIUM CHLORIDE 0.9 % (FLUSH) 0.9 %
10 SYRINGE (ML) INJECTION ONCE
Status: DISCONTINUED | OUTPATIENT
Start: 2017-08-08 | End: 2017-08-08

## 2017-08-08 RX ORDER — ATROPINE SULFATE 0.4 MG/ML
0.2 AMPUL (ML) INJECTION ONCE
Status: COMPLETED | OUTPATIENT
Start: 2017-08-08 | End: 2017-08-08

## 2017-08-08 RX ORDER — SODIUM CHLORIDE 0.9 % (FLUSH) 0.9 %
10 SYRINGE (ML) INJECTION ONCE
Status: CANCELLED | OUTPATIENT
Start: 2017-08-08

## 2017-08-08 RX ORDER — LORAZEPAM 0.5 MG/1
TABLET ORAL EVERY 4 HOURS PRN
Status: CANCELLED | OUTPATIENT
Start: 2017-08-08

## 2017-08-08 RX ORDER — PROCHLORPERAZINE MALEATE 10 MG
10 TABLET ORAL EVERY 6 HOURS PRN
Status: CANCELLED | OUTPATIENT
Start: 2017-08-08

## 2017-08-08 RX ADMIN — ATROPINE SULFATE 0.2 MG: 0.4 MG/ML AMPUL (ML) INJECTION at 11:33:00

## 2017-08-08 RX ADMIN — FLUOROURACIL 2900 MG: 50 INJECTION, SOLUTION INTRAVENOUS at 13:23:00

## 2017-08-08 NOTE — PROGRESS NOTES
Education Record    Learner:  Patient and Spouse    Disease / Diagnosis: Pancreatic Cancer    Barriers / Limitations:  None   Comments:    Method:  Brief focused and Discussion   Comments:    General Topics:  Medication, Side effects and symptom management

## 2017-08-08 NOTE — PROGRESS NOTES
Patient is here for cycle 12 of chemo. Pt is feeling well. Pt had a few loose stools on the day of the chemo cycle. Resolved quickly. No other GI complaints. Appetite and energy level is good.

## 2017-08-10 ENCOUNTER — OFFICE VISIT (OUTPATIENT)
Dept: HEMATOLOGY/ONCOLOGY | Facility: HOSPITAL | Age: 67
End: 2017-08-10
Attending: INTERNAL MEDICINE
Payer: MEDICARE

## 2017-08-10 DIAGNOSIS — C25.0 MALIGNANT NEOPLASM OF HEAD OF PANCREAS (HCC): Primary | ICD-10-CM

## 2017-08-10 PROCEDURE — 96372 THER/PROPH/DIAG INJ SC/IM: CPT

## 2017-08-14 ENCOUNTER — TELEPHONE (OUTPATIENT)
Dept: HEMATOLOGY/ONCOLOGY | Facility: HOSPITAL | Age: 67
End: 2017-08-14

## 2017-08-14 NOTE — TELEPHONE ENCOUNTER
Patient calling with an FYI for Dr. Steven Sanchez. She fell over the weekend and broke her wrist.  She is scheduled for surgery tomorrow to place a plate and screws. She did inform the surgeon she is receiving chemo. Any concerns from Dr. Maia urban?   Mess

## 2017-08-14 NOTE — TELEPHONE ENCOUNTER
Spoke to patient. Wrist fractured over weekend. Needs fixation tomorrow. Checking with Dr Phillip Argueta for clearance. To check counts. Ok to proceed otherwise.   JAKI Kwon

## 2017-08-25 ENCOUNTER — SOCIAL WORK SERVICES (OUTPATIENT)
Dept: HEMATOLOGY/ONCOLOGY | Facility: HOSPITAL | Age: 67
End: 2017-08-25

## 2017-08-25 ENCOUNTER — OFFICE VISIT (OUTPATIENT)
Dept: HEMATOLOGY/ONCOLOGY | Facility: HOSPITAL | Age: 67
End: 2017-08-25
Attending: INTERNAL MEDICINE
Payer: MEDICARE

## 2017-08-25 VITALS
HEART RATE: 67 BPM | HEIGHT: 66.18 IN | SYSTOLIC BLOOD PRESSURE: 170 MMHG | RESPIRATION RATE: 16 BRPM | BODY MASS INDEX: 28.64 KG/M2 | WEIGHT: 178.19 LBS | OXYGEN SATURATION: 98 % | DIASTOLIC BLOOD PRESSURE: 95 MMHG | TEMPERATURE: 97 F

## 2017-08-25 DIAGNOSIS — C25.0 MALIGNANT NEOPLASM OF HEAD OF PANCREAS (HCC): ICD-10-CM

## 2017-08-25 DIAGNOSIS — C25.0 MALIGNANT NEOPLASM OF HEAD OF PANCREAS (HCC): Primary | ICD-10-CM

## 2017-08-25 LAB
ALBUMIN SERPL-MCNC: 3.4 G/DL (ref 3.5–4.8)
ALP LIVER SERPL-CCNC: 136 U/L (ref 55–142)
ALT SERPL-CCNC: 28 U/L (ref 14–54)
AST SERPL-CCNC: 15 U/L (ref 15–41)
BASOPHILS # BLD AUTO: 0.05 X10(3) UL (ref 0–0.1)
BASOPHILS NFR BLD AUTO: 1.2 %
BILIRUB SERPL-MCNC: 0.3 MG/DL (ref 0.1–2)
BUN BLD-MCNC: 20 MG/DL (ref 8–20)
CALCIUM BLD-MCNC: 9.1 MG/DL (ref 8.3–10.3)
CARBOHYDRATE AG 19-9: 25.5 U/ML (ref 1.2–35)
CEA: 3.6 NG/ML (ref 0.5–5)
CHLORIDE: 106 MMOL/L (ref 101–111)
CO2: 27 MMOL/L (ref 22–32)
CREAT BLD-MCNC: 0.62 MG/DL (ref 0.55–1.02)
EOSINOPHIL # BLD AUTO: 0.1 X10(3) UL (ref 0–0.3)
EOSINOPHIL NFR BLD AUTO: 2.4 %
ERYTHROCYTE [DISTWIDTH] IN BLOOD BY AUTOMATED COUNT: 13.3 % (ref 11.5–16)
GLUCOSE BLD-MCNC: 77 MG/DL (ref 70–99)
HCT VFR BLD AUTO: 38.4 % (ref 34–50)
HGB BLD-MCNC: 12.8 G/DL (ref 12–16)
IMMATURE GRANULOCYTE COUNT: 0 X10(3) UL (ref 0–1)
IMMATURE GRANULOCYTE RATIO %: 0 %
LYMPHOCYTES # BLD AUTO: 1.69 X10(3) UL (ref 0.9–4)
LYMPHOCYTES NFR BLD AUTO: 41.2 %
M PROTEIN MFR SERPL ELPH: 6.7 G/DL (ref 6.1–8.3)
MCH RBC QN AUTO: 31.4 PG (ref 27–33.2)
MCHC RBC AUTO-ENTMCNC: 33.3 G/DL (ref 31–37)
MCV RBC AUTO: 94.3 FL (ref 81–100)
MONOCYTES # BLD AUTO: 0.35 X10(3) UL (ref 0.1–0.6)
MONOCYTES NFR BLD AUTO: 8.5 %
NEUTROPHIL ABS PRELIM: 1.91 X10 (3) UL (ref 1.3–6.7)
NEUTROPHILS # BLD AUTO: 1.91 X10(3) UL (ref 1.3–6.7)
NEUTROPHILS NFR BLD AUTO: 46.7 %
PLATELET # BLD AUTO: 250 10(3)UL (ref 150–450)
POTASSIUM SERPL-SCNC: 3.9 MMOL/L (ref 3.6–5.1)
RBC # BLD AUTO: 4.07 X10(6)UL (ref 3.8–5.1)
RED CELL DISTRIBUTION WIDTH-SD: 46.1 FL (ref 35.1–46.3)
SODIUM SERPL-SCNC: 139 MMOL/L (ref 136–144)
WBC # BLD AUTO: 4.1 X10(3) UL (ref 4–13)

## 2017-08-25 PROCEDURE — 99214 OFFICE O/P EST MOD 30 MIN: CPT | Performed by: NURSE PRACTITIONER

## 2017-08-25 PROCEDURE — 96367 TX/PROPH/DG ADDL SEQ IV INF: CPT

## 2017-08-25 PROCEDURE — 86301 IMMUNOASSAY TUMOR CA 19-9: CPT

## 2017-08-25 PROCEDURE — 80053 COMPREHEN METABOLIC PANEL: CPT

## 2017-08-25 PROCEDURE — 96413 CHEMO IV INFUSION 1 HR: CPT

## 2017-08-25 PROCEDURE — 82378 CARCINOEMBRYONIC ANTIGEN: CPT

## 2017-08-25 PROCEDURE — 85025 COMPLETE CBC W/AUTO DIFF WBC: CPT

## 2017-08-25 PROCEDURE — 96375 TX/PRO/DX INJ NEW DRUG ADDON: CPT

## 2017-08-25 PROCEDURE — 96368 THER/DIAG CONCURRENT INF: CPT

## 2017-08-25 RX ORDER — FLUOROURACIL 50 MG/ML
1500 INJECTION, SOLUTION INTRAVENOUS CONTINUOUS
Status: CANCELLED | OUTPATIENT
Start: 2017-09-05

## 2017-08-25 RX ORDER — FLUOROURACIL 50 MG/ML
1500 INJECTION, SOLUTION INTRAVENOUS CONTINUOUS
Status: DISCONTINUED | OUTPATIENT
Start: 2017-08-25 | End: 2017-08-25

## 2017-08-25 RX ORDER — PROCHLORPERAZINE MALEATE 10 MG
10 TABLET ORAL EVERY 6 HOURS PRN
Status: CANCELLED | OUTPATIENT
Start: 2017-09-05

## 2017-08-25 RX ORDER — LORAZEPAM 0.5 MG/1
TABLET ORAL EVERY 4 HOURS PRN
Status: CANCELLED | OUTPATIENT
Start: 2017-09-05

## 2017-08-25 RX ORDER — LORAZEPAM 2 MG/ML
INJECTION INTRAMUSCULAR EVERY 4 HOURS PRN
Status: CANCELLED | OUTPATIENT
Start: 2017-09-05

## 2017-08-25 RX ORDER — ATROPINE SULFATE 0.4 MG/ML
0.2 AMPUL (ML) INJECTION ONCE
Status: COMPLETED | OUTPATIENT
Start: 2017-08-25 | End: 2017-08-25

## 2017-08-25 RX ORDER — METOCLOPRAMIDE HYDROCHLORIDE 5 MG/ML
10 INJECTION INTRAMUSCULAR; INTRAVENOUS EVERY 6 HOURS PRN
Status: CANCELLED | OUTPATIENT
Start: 2017-09-05

## 2017-08-25 RX ORDER — ONDANSETRON 2 MG/ML
8 INJECTION INTRAMUSCULAR; INTRAVENOUS EVERY 6 HOURS PRN
Status: CANCELLED | OUTPATIENT
Start: 2017-09-05

## 2017-08-25 RX ORDER — ATROPINE SULFATE 0.4 MG/ML
0.2 AMPUL (ML) INJECTION ONCE
Status: CANCELLED | OUTPATIENT
Start: 2017-09-05

## 2017-08-25 RX ADMIN — ATROPINE SULFATE 0.2 MG: 0.4 MG/ML AMPUL (ML) INJECTION at 13:26:00

## 2017-08-25 RX ADMIN — FLUOROURACIL 2900 MG: 50 INJECTION, SOLUTION INTRAVENOUS at 15:10:00

## 2017-08-25 NOTE — PROGRESS NOTES
CC:Patient presents with:  Chemotherapy: cycle 13 of chemotherapy  Patient is here for APN assessment and cycle 13 of chemo. Pt fractured right forearm on August 12th. Surgery was done on August 15th, plates and screws inserted.  Started physical therapy on Temp 96.9 °F (36.1 °C) (A)   Pain Score 0     ROS Pertinent items are noted in HPI. PE  General: Patient is alert and oriented x 3, not in acute distress. HEENT: EOMs intact. PERRL. Oropharynx is clear. Neck: No JVD. No palpable lymphadenopathy.  Ne (3) uL   Neutrophil Absolute 1.91 1.30 - 6.70 x10(3) uL   Lymphocyte Absolute 1.69 0.90 - 4.00 x10(3) uL   Monocyte Absolute 0.35 0.10 - 0.60 x10(3) uL   Eosinophil Absolute 0.10 0.00 - 0.30 x10(3) uL   Basophil Absolute 0.05 0.00 - 0.10 x10(3) uL   Immatu

## 2017-08-25 NOTE — PROGRESS NOTES
Patient is here for APN assessment and cycle 13 of chemo. Pt fractured right forearm on August 12th. Surgery was done on August 15th, plates and screws inserted. Started physical therapy on Tuesday. Pt denies any chemo side effects over the past few weeks.

## 2017-08-25 NOTE — PATIENT INSTRUCTIONS
Education Record     Learner:  Patient and      Disease / Diagnosis:PANCREATIC CANCER     Barriers / Limitations:  None              Comments:     Method:  Brief focused and Reinforcement              Comments:     General Topics:  Medication, Side

## 2017-08-25 NOTE — PROGRESS NOTES
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 BLAS e-mailed it to the [address] [place] [address]. [email or web address] as requested by patient.

## 2017-08-27 ENCOUNTER — NURSE ONLY (OUTPATIENT)
Dept: HEMATOLOGY/ONCOLOGY | Facility: HOSPITAL | Age: 67
End: 2017-08-27
Attending: INTERNAL MEDICINE
Payer: MEDICARE

## 2017-08-27 DIAGNOSIS — C25.0 MALIGNANT NEOPLASM OF HEAD OF PANCREAS (HCC): Primary | ICD-10-CM

## 2017-08-27 PROCEDURE — 96372 THER/PROPH/DIAG INJ SC/IM: CPT

## 2017-08-27 RX ORDER — SODIUM CHLORIDE 0.9 % (FLUSH) 0.9 %
10 SYRINGE (ML) INJECTION ONCE
Status: CANCELLED | OUTPATIENT
Start: 2017-08-27

## 2017-08-27 RX ORDER — SODIUM CHLORIDE 0.9 % (FLUSH) 0.9 %
10 SYRINGE (ML) INJECTION ONCE
Status: COMPLETED | OUTPATIENT
Start: 2017-08-27 | End: 2017-08-27

## 2017-08-27 RX ADMIN — SODIUM CHLORIDE 0.9 % (FLUSH) 10 ML: 0.9 % SYRINGE (ML) INJECTION at 10:50:00

## 2017-08-28 ENCOUNTER — PATIENT MESSAGE (OUTPATIENT)
Dept: HEMATOLOGY/ONCOLOGY | Facility: HOSPITAL | Age: 67
End: 2017-08-28

## 2017-08-29 NOTE — PROGRESS NOTES
[facility]    PATIENT'S NAME: Chari Crowe   ATTENDING PHYSICIAN: Lucía Blevins M.D.    PATIENT ACCOUNT #: [de-identified] LOCATION: [address] [place] [address] RECORD #: [id number] YOB: 1950   DATE OF SERVICE: 08/08/2017       TANESHA include calcium and vitamin D; carboxymethylcellulose ophthalmic solution; Premarin vaginal cream, which she uses sparingly; fluoxetine 20 mg daily; latanoprost ophthalmic solution; levothyroxine 75 mcg daily; lisinopril 10 mg daily, metoprolol 25 mg daily At this point, we will plan on treating her at the 2-week interval, but then further intervals will be determined by her markers at that time.     Dictated By Kasia Loomis M.D.  d: 08/28/2017 17:20:57  t: 08/29/2017 04:36:18  [facility] [id number]  FLORENTINO/AKASH

## 2017-08-31 ENCOUNTER — SNF/IP PROF CHARGE ONLY (OUTPATIENT)
Dept: HEMATOLOGY/ONCOLOGY | Facility: HOSPITAL | Age: 67
End: 2017-08-31

## 2017-08-31 DIAGNOSIS — C25.0 MALIGNANT NEOPLASM OF HEAD OF PANCREAS (HCC): ICD-10-CM

## 2017-08-31 PROCEDURE — G9678 ONCOLOGY CARE MODEL SERVICE: HCPCS | Performed by: INTERNAL MEDICINE

## 2017-09-15 ENCOUNTER — OFFICE VISIT (OUTPATIENT)
Dept: HEMATOLOGY/ONCOLOGY | Facility: HOSPITAL | Age: 67
End: 2017-09-15
Attending: INTERNAL MEDICINE
Payer: MEDICARE

## 2017-09-15 VITALS
OXYGEN SATURATION: 98 % | HEIGHT: 66.18 IN | DIASTOLIC BLOOD PRESSURE: 82 MMHG | BODY MASS INDEX: 29.33 KG/M2 | HEART RATE: 70 BPM | WEIGHT: 182.5 LBS | TEMPERATURE: 97 F | SYSTOLIC BLOOD PRESSURE: 142 MMHG | RESPIRATION RATE: 18 BRPM

## 2017-09-15 DIAGNOSIS — C25.0 MALIGNANT NEOPLASM OF HEAD OF PANCREAS (HCC): Primary | ICD-10-CM

## 2017-09-15 DIAGNOSIS — C78.7 SECONDARY ADENOCARCINOMA OF LIVER (HCC): ICD-10-CM

## 2017-09-15 LAB
ALBUMIN SERPL-MCNC: 3.4 G/DL (ref 3.5–4.8)
ALP LIVER SERPL-CCNC: 118 U/L (ref 55–142)
ALT SERPL-CCNC: 37 U/L (ref 14–54)
AST SERPL-CCNC: 24 U/L (ref 15–41)
BASOPHILS # BLD AUTO: 0.06 X10(3) UL (ref 0–0.1)
BASOPHILS NFR BLD AUTO: 1.6 %
BILIRUB SERPL-MCNC: 0.4 MG/DL (ref 0.1–2)
BUN BLD-MCNC: 20 MG/DL (ref 8–20)
CALCIUM BLD-MCNC: 9 MG/DL (ref 8.3–10.3)
CARBOHYDRATE AG 19-9: 20.5 U/ML (ref 1.2–35)
CEA: 3.6 NG/ML (ref 0.5–5)
CHLORIDE: 106 MMOL/L (ref 101–111)
CO2: 27 MMOL/L (ref 22–32)
CREAT BLD-MCNC: 0.74 MG/DL (ref 0.55–1.02)
EOSINOPHIL # BLD AUTO: 0.1 X10(3) UL (ref 0–0.3)
EOSINOPHIL NFR BLD AUTO: 2.6 %
ERYTHROCYTE [DISTWIDTH] IN BLOOD BY AUTOMATED COUNT: 13.9 % (ref 11.5–16)
GLUCOSE BLD-MCNC: 97 MG/DL (ref 70–99)
HCT VFR BLD AUTO: 39.5 % (ref 34–50)
HGB BLD-MCNC: 12.9 G/DL (ref 12–16)
IMMATURE GRANULOCYTE COUNT: 0 X10(3) UL (ref 0–1)
IMMATURE GRANULOCYTE RATIO %: 0 %
LYMPHOCYTES # BLD AUTO: 1.63 X10(3) UL (ref 0.9–4)
LYMPHOCYTES NFR BLD AUTO: 42.8 %
M PROTEIN MFR SERPL ELPH: 6.6 G/DL (ref 6.1–8.3)
MCH RBC QN AUTO: 31.7 PG (ref 27–33.2)
MCHC RBC AUTO-ENTMCNC: 32.7 G/DL (ref 31–37)
MCV RBC AUTO: 97.1 FL (ref 81–100)
MONOCYTES # BLD AUTO: 0.46 X10(3) UL (ref 0.1–0.6)
MONOCYTES NFR BLD AUTO: 12.1 %
NEUTROPHIL ABS PRELIM: 1.56 X10 (3) UL (ref 1.3–6.7)
NEUTROPHILS # BLD AUTO: 1.56 X10(3) UL (ref 1.3–6.7)
NEUTROPHILS NFR BLD AUTO: 40.9 %
PLATELET # BLD AUTO: 246 10(3)UL (ref 150–450)
POTASSIUM SERPL-SCNC: 4.1 MMOL/L (ref 3.6–5.1)
RBC # BLD AUTO: 4.07 X10(6)UL (ref 3.8–5.1)
RED CELL DISTRIBUTION WIDTH-SD: 49.7 FL (ref 35.1–46.3)
SODIUM SERPL-SCNC: 139 MMOL/L (ref 136–144)
WBC # BLD AUTO: 3.8 X10(3) UL (ref 4–13)

## 2017-09-15 PROCEDURE — 85025 COMPLETE CBC W/AUTO DIFF WBC: CPT

## 2017-09-15 PROCEDURE — 82378 CARCINOEMBRYONIC ANTIGEN: CPT

## 2017-09-15 PROCEDURE — 96375 TX/PRO/DX INJ NEW DRUG ADDON: CPT

## 2017-09-15 PROCEDURE — 96413 CHEMO IV INFUSION 1 HR: CPT

## 2017-09-15 PROCEDURE — 80053 COMPREHEN METABOLIC PANEL: CPT

## 2017-09-15 PROCEDURE — 86301 IMMUNOASSAY TUMOR CA 19-9: CPT

## 2017-09-15 PROCEDURE — 99214 OFFICE O/P EST MOD 30 MIN: CPT | Performed by: INTERNAL MEDICINE

## 2017-09-15 PROCEDURE — 96368 THER/DIAG CONCURRENT INF: CPT

## 2017-09-15 PROCEDURE — 96367 TX/PROPH/DG ADDL SEQ IV INF: CPT

## 2017-09-15 RX ORDER — SODIUM CHLORIDE 0.9 % (FLUSH) 0.9 %
10 SYRINGE (ML) INJECTION ONCE
Status: CANCELLED | OUTPATIENT
Start: 2017-09-15

## 2017-09-15 RX ORDER — FLUOROURACIL 50 MG/ML
1500 INJECTION, SOLUTION INTRAVENOUS CONTINUOUS
Status: DISCONTINUED | OUTPATIENT
Start: 2017-09-15 | End: 2017-09-15

## 2017-09-15 RX ORDER — ATROPINE SULFATE 0.4 MG/ML
0.2 AMPUL (ML) INJECTION ONCE
Status: COMPLETED | OUTPATIENT
Start: 2017-09-15 | End: 2017-09-15

## 2017-09-15 RX ORDER — LORAZEPAM 0.5 MG/1
TABLET ORAL EVERY 4 HOURS PRN
Status: CANCELLED | OUTPATIENT
Start: 2017-09-15

## 2017-09-15 RX ORDER — PROCHLORPERAZINE MALEATE 10 MG
10 TABLET ORAL EVERY 6 HOURS PRN
Status: CANCELLED | OUTPATIENT
Start: 2017-09-15

## 2017-09-15 RX ORDER — ATROPINE SULFATE 0.4 MG/ML
0.2 AMPUL (ML) INJECTION ONCE
Status: CANCELLED | OUTPATIENT
Start: 2017-09-15

## 2017-09-15 RX ORDER — ONDANSETRON 2 MG/ML
8 INJECTION INTRAMUSCULAR; INTRAVENOUS EVERY 6 HOURS PRN
Status: CANCELLED | OUTPATIENT
Start: 2017-09-15

## 2017-09-15 RX ORDER — METOCLOPRAMIDE HYDROCHLORIDE 5 MG/ML
10 INJECTION INTRAMUSCULAR; INTRAVENOUS EVERY 6 HOURS PRN
Status: CANCELLED | OUTPATIENT
Start: 2017-09-15

## 2017-09-15 RX ORDER — LEVOTHYROXINE SODIUM 0.07 MG/1
75 TABLET ORAL
COMMUNITY
Start: 2017-09-11

## 2017-09-15 RX ORDER — LORAZEPAM 2 MG/ML
INJECTION INTRAMUSCULAR EVERY 4 HOURS PRN
Status: CANCELLED | OUTPATIENT
Start: 2017-09-15

## 2017-09-15 RX ORDER — SODIUM CHLORIDE 0.9 % (FLUSH) 0.9 %
10 SYRINGE (ML) INJECTION ONCE
Status: DISCONTINUED | OUTPATIENT
Start: 2017-09-15 | End: 2017-09-15

## 2017-09-15 RX ORDER — FLUOROURACIL 50 MG/ML
1500 INJECTION, SOLUTION INTRAVENOUS CONTINUOUS
Status: CANCELLED | OUTPATIENT
Start: 2017-09-15

## 2017-09-15 RX ORDER — SODIUM CHLORIDE 0.9 % (FLUSH) 0.9 %
10 SYRINGE (ML) INJECTION ONCE
Status: COMPLETED | OUTPATIENT
Start: 2017-09-17 | End: 2017-09-17

## 2017-09-15 RX ADMIN — ATROPINE SULFATE 0.2 MG: 0.4 MG/ML AMPUL (ML) INJECTION at 10:44:00

## 2017-09-15 RX ADMIN — FLUOROURACIL 2900 MG: 50 INJECTION, SOLUTION INTRAVENOUS at 12:25:00

## 2017-09-15 NOTE — PROGRESS NOTES
Patient is here for MD f/u and cycle 14 of chemo. Pt is recovering from right forearm fracture. Still doing physical therapy and will soon begin weight bearing activities. Appetite and energy level is good.  Pt noticed the fatigue lasting a day longer after

## 2017-09-15 NOTE — PROGRESS NOTES
Pt arrived for MD f/u and chemo treatment, pt denies any adverse S/S currently and states feeling well, pt states usually feels increased fatigue with upset stomach for 2-3 post treatment that resolves without medication, pt alert and appears in nad, pt am

## 2017-09-16 NOTE — PROGRESS NOTES
[facility]    PATIENT'S NAME: Will Roma   ATTENDING PHYSICIAN: Sharrie Councilman, M.D.    PATIENT ACCOUNT #: [de-identified] LOCATION: [address] [place] [address] RECORD #: [id number] YOB: 1950   DATE OF SERVICE: 09/15/2017       TANESHA the weight load on the arm.   Her current medications include calcium and vitamin D, carboxymethyl cellulose eyedrops, Premarin vaginal cream, fluoxetine, latanoprost ophthalmic solution, levothyroxine, lisinopril, metoprolol, ondansetron, and prochlorperaz M.D.  d: 09/16/2017 06:44:22  t: 09/16/2017 09:22:06  [facility] [id number]  /    cc: Deb Sanabria Dr.

## 2017-09-17 ENCOUNTER — OFFICE VISIT (OUTPATIENT)
Dept: HEMATOLOGY/ONCOLOGY | Facility: HOSPITAL | Age: 67
End: 2017-09-17
Attending: INTERNAL MEDICINE
Payer: MEDICARE

## 2017-09-17 DIAGNOSIS — C25.0 MALIGNANT NEOPLASM OF HEAD OF PANCREAS (HCC): Primary | ICD-10-CM

## 2017-09-17 PROCEDURE — 96372 THER/PROPH/DIAG INJ SC/IM: CPT

## 2017-09-17 PROCEDURE — 96523 IRRIG DRUG DELIVERY DEVICE: CPT

## 2017-09-17 RX ADMIN — SODIUM CHLORIDE 0.9 % (FLUSH) 10 ML: 0.9 % SYRINGE (ML) INJECTION at 10:15:00

## 2017-09-30 ENCOUNTER — SNF/IP PROF CHARGE ONLY (OUTPATIENT)
Dept: HEMATOLOGY/ONCOLOGY | Facility: HOSPITAL | Age: 67
End: 2017-09-30

## 2017-09-30 DIAGNOSIS — C25.0 MALIGNANT NEOPLASM OF HEAD OF PANCREAS (HCC): ICD-10-CM

## 2017-09-30 PROCEDURE — G9678 ONCOLOGY CARE MODEL SERVICE: HCPCS | Performed by: INTERNAL MEDICINE

## 2017-10-06 ENCOUNTER — OFFICE VISIT (OUTPATIENT)
Dept: HEMATOLOGY/ONCOLOGY | Facility: HOSPITAL | Age: 67
End: 2017-10-06
Attending: INTERNAL MEDICINE
Payer: MEDICARE

## 2017-10-06 VITALS
OXYGEN SATURATION: 98 % | WEIGHT: 187 LBS | HEART RATE: 65 BPM | SYSTOLIC BLOOD PRESSURE: 131 MMHG | RESPIRATION RATE: 18 BRPM | DIASTOLIC BLOOD PRESSURE: 82 MMHG | BODY MASS INDEX: 30 KG/M2 | TEMPERATURE: 97 F

## 2017-10-06 DIAGNOSIS — C25.0 MALIGNANT NEOPLASM OF HEAD OF PANCREAS (HCC): Primary | ICD-10-CM

## 2017-10-06 PROCEDURE — 96368 THER/DIAG CONCURRENT INF: CPT

## 2017-10-06 PROCEDURE — 80053 COMPREHEN METABOLIC PANEL: CPT

## 2017-10-06 PROCEDURE — 96375 TX/PRO/DX INJ NEW DRUG ADDON: CPT

## 2017-10-06 PROCEDURE — 96367 TX/PROPH/DG ADDL SEQ IV INF: CPT

## 2017-10-06 PROCEDURE — 96413 CHEMO IV INFUSION 1 HR: CPT

## 2017-10-06 PROCEDURE — 99214 OFFICE O/P EST MOD 30 MIN: CPT | Performed by: NURSE PRACTITIONER

## 2017-10-06 PROCEDURE — 82378 CARCINOEMBRYONIC ANTIGEN: CPT

## 2017-10-06 PROCEDURE — 85025 COMPLETE CBC W/AUTO DIFF WBC: CPT

## 2017-10-06 PROCEDURE — 86301 IMMUNOASSAY TUMOR CA 19-9: CPT

## 2017-10-06 RX ORDER — ALBUTEROL SULFATE 90 UG/1
1 AEROSOL, METERED RESPIRATORY (INHALATION) EVERY 6 HOURS PRN
Qty: 1 INHALER | Refills: 0 | Status: SHIPPED | OUTPATIENT
Start: 2017-10-06 | End: 2017-10-06

## 2017-10-06 RX ORDER — FLUOROURACIL 50 MG/ML
1500 INJECTION, SOLUTION INTRAVENOUS CONTINUOUS
Status: CANCELLED | OUTPATIENT
Start: 2017-10-06

## 2017-10-06 RX ORDER — ATROPINE SULFATE 0.4 MG/ML
0.2 AMPUL (ML) INJECTION ONCE
Status: CANCELLED | OUTPATIENT
Start: 2017-10-06

## 2017-10-06 RX ORDER — FLUOROURACIL 50 MG/ML
1500 INJECTION, SOLUTION INTRAVENOUS CONTINUOUS
Status: DISCONTINUED | OUTPATIENT
Start: 2017-10-06 | End: 2017-10-06

## 2017-10-06 RX ORDER — ATROPINE SULFATE 0.4 MG/ML
0.2 AMPUL (ML) INJECTION ONCE
Status: COMPLETED | OUTPATIENT
Start: 2017-10-06 | End: 2017-10-06

## 2017-10-06 RX ADMIN — ATROPINE SULFATE 0.2 MG: 0.4 MG/ML AMPUL (ML) INJECTION at 11:46:00

## 2017-10-06 RX ADMIN — FLUOROURACIL 2900 MG: 50 INJECTION, SOLUTION INTRAVENOUS at 13:30:00

## 2017-10-06 NOTE — PROGRESS NOTES
Pt arrived for MD f/u and chemo treatment, pt denies any adverse S/S except for mild fatigue post treatment, pt alert and appears in nad, pt ambulates with steady gait, pt will return day before chemo for lab draw  Education Record    Learner:  Patient and

## 2017-10-06 NOTE — PROGRESS NOTES
CC: scheduled maintenance chemotherapy for pancreatic cancer    HPI:   Darion Rodriguez is a(n) 79year old female followed by Dr. Milli Loza for metastatic pancreatic cancer.  She was initially treated with FOLFIRINOX.  She had received part of her treatm Recent Results (from the past 24 hour(s))  -CEA   Collection Time: 10/06/17  9:37 AM   Result Value Ref Range   CEA 3.4 0.5 - 5.0 ng/mL   -CARBOHYDRATE ANTIGEN 19-9   Collection Time: 10/06/17  9:37 AM   Result Value Ref Range   Carbohydrate Ag 19-9 25 bit of a jump in august.  Will have her return in 3 weeks with labs the day before so that results are back and she can discuss with dr. Azucena Adan.   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

## 2017-10-08 ENCOUNTER — OFFICE VISIT (OUTPATIENT)
Dept: HEMATOLOGY/ONCOLOGY | Facility: HOSPITAL | Age: 67
End: 2017-10-08
Attending: INTERNAL MEDICINE
Payer: MEDICARE

## 2017-10-08 DIAGNOSIS — C25.0 MALIGNANT NEOPLASM OF HEAD OF PANCREAS (HCC): Primary | ICD-10-CM

## 2017-10-08 PROCEDURE — 96523 IRRIG DRUG DELIVERY DEVICE: CPT

## 2017-10-08 PROCEDURE — 96372 THER/PROPH/DIAG INJ SC/IM: CPT

## 2017-10-08 RX ORDER — SODIUM CHLORIDE 0.9 % (FLUSH) 0.9 %
10 SYRINGE (ML) INJECTION ONCE
Status: COMPLETED | OUTPATIENT
Start: 2017-10-08 | End: 2017-10-08

## 2017-10-08 RX ORDER — SODIUM CHLORIDE 0.9 % (FLUSH) 0.9 %
10 SYRINGE (ML) INJECTION ONCE
Status: CANCELLED | OUTPATIENT
Start: 2017-10-08

## 2017-10-08 RX ADMIN — SODIUM CHLORIDE 0.9 % (FLUSH) 10 ML: 0.9 % SYRINGE (ML) INJECTION at 10:14:00

## 2017-10-26 ENCOUNTER — NURSE ONLY (OUTPATIENT)
Dept: HEMATOLOGY/ONCOLOGY | Facility: HOSPITAL | Age: 67
End: 2017-10-26
Attending: INTERNAL MEDICINE
Payer: MEDICARE

## 2017-10-26 DIAGNOSIS — C25.0 MALIGNANT NEOPLASM OF HEAD OF PANCREAS (HCC): Primary | ICD-10-CM

## 2017-10-26 PROCEDURE — 80053 COMPREHEN METABOLIC PANEL: CPT

## 2017-10-26 PROCEDURE — 36591 DRAW BLOOD OFF VENOUS DEVICE: CPT

## 2017-10-26 PROCEDURE — 82378 CARCINOEMBRYONIC ANTIGEN: CPT

## 2017-10-26 PROCEDURE — 85025 COMPLETE CBC W/AUTO DIFF WBC: CPT

## 2017-10-26 PROCEDURE — 86301 IMMUNOASSAY TUMOR CA 19-9: CPT

## 2017-10-26 RX ORDER — SODIUM CHLORIDE 0.9 % (FLUSH) 0.9 %
10 SYRINGE (ML) INJECTION ONCE
Status: COMPLETED | OUTPATIENT
Start: 2017-10-26 | End: 2017-10-26

## 2017-10-26 RX ORDER — SODIUM CHLORIDE 0.9 % (FLUSH) 0.9 %
10 SYRINGE (ML) INJECTION ONCE
Status: CANCELLED | OUTPATIENT
Start: 2017-10-26

## 2017-10-26 RX ADMIN — SODIUM CHLORIDE 0.9 % (FLUSH) 10 ML: 0.9 % SYRINGE (ML) INJECTION at 09:25:00

## 2017-10-27 ENCOUNTER — OFFICE VISIT (OUTPATIENT)
Dept: HEMATOLOGY/ONCOLOGY | Facility: HOSPITAL | Age: 67
End: 2017-10-27
Attending: INTERNAL MEDICINE
Payer: MEDICARE

## 2017-10-27 VITALS
WEIGHT: 187 LBS | OXYGEN SATURATION: 98 % | TEMPERATURE: 97 F | HEIGHT: 66.18 IN | RESPIRATION RATE: 18 BRPM | SYSTOLIC BLOOD PRESSURE: 158 MMHG | HEART RATE: 72 BPM | DIASTOLIC BLOOD PRESSURE: 83 MMHG | BODY MASS INDEX: 30.05 KG/M2

## 2017-10-27 DIAGNOSIS — C25.0 MALIGNANT NEOPLASM OF HEAD OF PANCREAS (HCC): Primary | ICD-10-CM

## 2017-10-27 DIAGNOSIS — C78.7 SECONDARY ADENOCARCINOMA OF LIVER (HCC): ICD-10-CM

## 2017-10-27 PROCEDURE — 96365 THER/PROPH/DIAG IV INF INIT: CPT

## 2017-10-27 PROCEDURE — 99214 OFFICE O/P EST MOD 30 MIN: CPT | Performed by: INTERNAL MEDICINE

## 2017-10-27 PROCEDURE — 96375 TX/PRO/DX INJ NEW DRUG ADDON: CPT

## 2017-10-27 RX ORDER — FLUOROURACIL 50 MG/ML
1500 INJECTION, SOLUTION INTRAVENOUS CONTINUOUS
Status: CANCELLED | OUTPATIENT
Start: 2017-10-27

## 2017-10-27 RX ORDER — LORAZEPAM 0.5 MG/1
TABLET ORAL EVERY 4 HOURS PRN
Status: CANCELLED | OUTPATIENT
Start: 2017-10-27

## 2017-10-27 RX ORDER — FLUOROURACIL 50 MG/ML
1500 INJECTION, SOLUTION INTRAVENOUS CONTINUOUS
Status: DISCONTINUED | OUTPATIENT
Start: 2017-10-27 | End: 2017-10-27

## 2017-10-27 RX ORDER — SODIUM CHLORIDE 0.9 % (FLUSH) 0.9 %
10 SYRINGE (ML) INJECTION ONCE
Status: CANCELLED | OUTPATIENT
Start: 2017-10-27

## 2017-10-27 RX ORDER — ONDANSETRON 2 MG/ML
8 INJECTION INTRAMUSCULAR; INTRAVENOUS EVERY 6 HOURS PRN
Status: CANCELLED | OUTPATIENT
Start: 2017-10-27

## 2017-10-27 RX ORDER — AMOXICILLIN 500 MG/1
TABLET, FILM COATED ORAL
Qty: 4 TABLET | Refills: 6 | Status: SHIPPED | OUTPATIENT
Start: 2017-10-27 | End: 2018-11-14 | Stop reason: ALTCHOICE

## 2017-10-27 RX ORDER — PROCHLORPERAZINE MALEATE 10 MG
10 TABLET ORAL EVERY 6 HOURS PRN
Status: CANCELLED | OUTPATIENT
Start: 2017-10-27

## 2017-10-27 RX ORDER — METOCLOPRAMIDE HYDROCHLORIDE 5 MG/ML
10 INJECTION INTRAMUSCULAR; INTRAVENOUS EVERY 6 HOURS PRN
Status: CANCELLED | OUTPATIENT
Start: 2017-10-27

## 2017-10-27 RX ORDER — SODIUM CHLORIDE 0.9 % (FLUSH) 0.9 %
10 SYRINGE (ML) INJECTION ONCE
Status: COMPLETED | OUTPATIENT
Start: 2017-10-29 | End: 2017-10-29

## 2017-10-27 RX ORDER — LORAZEPAM 2 MG/ML
INJECTION INTRAMUSCULAR EVERY 4 HOURS PRN
Status: CANCELLED | OUTPATIENT
Start: 2017-10-27

## 2017-10-27 RX ADMIN — FLUOROURACIL 2900 MG: 50 INJECTION, SOLUTION INTRAVENOUS at 11:50:00

## 2017-10-27 NOTE — PROGRESS NOTES
Education Record    Learner:  Patient    Disease / Diagnosis:pamcreatic ca  Barriers / Limitations:  None   Comments:    Method:  Discussion   Comments:    General Topics:  Diet, Infection, Medication, Pain, Precautions, Procedure, Side effects and symptom

## 2017-10-27 NOTE — PROGRESS NOTES
Patient is here for MD f/u pancreatic cancer and cycle 16 of Leuco/5FU. Pt is having a dental cleaning next week. Asking if she needs antibiotics before the visit. Appetite is good. Energy level is good. Pt has been traveling a lot in the last few months.

## 2017-10-29 ENCOUNTER — NURSE ONLY (OUTPATIENT)
Dept: HEMATOLOGY/ONCOLOGY | Facility: HOSPITAL | Age: 67
End: 2017-10-29
Attending: INTERNAL MEDICINE
Payer: MEDICARE

## 2017-10-29 DIAGNOSIS — C25.0 MALIGNANT NEOPLASM OF HEAD OF PANCREAS (HCC): Primary | ICD-10-CM

## 2017-10-29 PROCEDURE — 96523 IRRIG DRUG DELIVERY DEVICE: CPT

## 2017-10-29 RX ADMIN — SODIUM CHLORIDE 0.9 % (FLUSH) 10 ML: 0.9 % SYRINGE (ML) INJECTION at 09:50:00

## 2017-10-29 NOTE — PROGRESS NOTES
[facility]    PATIENT'S NAME: Chrissy Handing   ATTENDING PHYSICIAN: Saurabh Gupta M.D.    PATIENT ACCOUNT #: [de-identified] LOCATION: [address] [place] [address] RECORD #: [id number] YOB: 1950   DATE OF SERVICE: 10/27/2017       TANESHA drops, Premarin vaginal cream p.r.n., fluoxetine 20 mg daily, latanoprost ophthalmic solution daily, levothyroxine 75 mcg daily, lisinopril 10 mg daily, metoprolol 25 mg daily, ondansetron 8 mg q.8 h. p.r.n. and prochlorperazine 10 mg q.6 h. p.r.n.      CHELE M.D.  d: 10/29/2017 07:59:39  t: 10/29/2017 17:25:26  [facility] [id number]  /    cc: Ann Sanabria

## 2017-10-31 ENCOUNTER — SOCIAL WORK SERVICES (OUTPATIENT)
Dept: HEMATOLOGY/ONCOLOGY | Facility: HOSPITAL | Age: 67
End: 2017-10-31

## 2017-10-31 ENCOUNTER — SNF/IP PROF CHARGE ONLY (OUTPATIENT)
Dept: HEMATOLOGY/ONCOLOGY | Facility: HOSPITAL | Age: 67
End: 2017-10-31

## 2017-10-31 DIAGNOSIS — C25.0 MALIGNANT NEOPLASM OF HEAD OF PANCREAS (HCC): ICD-10-CM

## 2017-10-31 PROCEDURE — G9678 ONCOLOGY CARE MODEL SERVICE: HCPCS | Performed by: INTERNAL MEDICINE

## 2017-10-31 NOTE — PROGRESS NOTES
BLAS completed patients [address] [place], [place] [address] Floor Physician Report and faxed the form and medical information requested to them at fax # [phone] as requested. by patient.

## 2017-11-16 ENCOUNTER — NURSE ONLY (OUTPATIENT)
Dept: HEMATOLOGY/ONCOLOGY | Facility: HOSPITAL | Age: 67
End: 2017-11-16
Attending: INTERNAL MEDICINE
Payer: MEDICARE

## 2017-11-16 DIAGNOSIS — C25.0 MALIGNANT NEOPLASM OF HEAD OF PANCREAS (HCC): Primary | ICD-10-CM

## 2017-11-16 PROCEDURE — 82378 CARCINOEMBRYONIC ANTIGEN: CPT

## 2017-11-16 PROCEDURE — 36591 DRAW BLOOD OFF VENOUS DEVICE: CPT

## 2017-11-16 PROCEDURE — 85025 COMPLETE CBC W/AUTO DIFF WBC: CPT

## 2017-11-16 PROCEDURE — 80053 COMPREHEN METABOLIC PANEL: CPT

## 2017-11-16 PROCEDURE — 86301 IMMUNOASSAY TUMOR CA 19-9: CPT

## 2017-11-16 RX ORDER — SODIUM CHLORIDE 0.9 % (FLUSH) 0.9 %
10 SYRINGE (ML) INJECTION ONCE
Status: COMPLETED | OUTPATIENT
Start: 2017-11-16 | End: 2017-11-16

## 2017-11-16 RX ORDER — SODIUM CHLORIDE 0.9 % (FLUSH) 0.9 %
10 SYRINGE (ML) INJECTION ONCE
Status: CANCELLED | OUTPATIENT
Start: 2017-11-16

## 2017-11-16 RX ADMIN — SODIUM CHLORIDE 0.9 % (FLUSH) 10 ML: 0.9 % SYRINGE (ML) INJECTION at 09:15:00

## 2017-11-17 ENCOUNTER — OFFICE VISIT (OUTPATIENT)
Dept: HEMATOLOGY/ONCOLOGY | Facility: HOSPITAL | Age: 67
End: 2017-11-17
Attending: INTERNAL MEDICINE
Payer: MEDICARE

## 2017-11-17 VITALS
RESPIRATION RATE: 18 BRPM | SYSTOLIC BLOOD PRESSURE: 125 MMHG | DIASTOLIC BLOOD PRESSURE: 80 MMHG | OXYGEN SATURATION: 98 % | WEIGHT: 189.81 LBS | HEART RATE: 74 BPM | BODY MASS INDEX: 30.51 KG/M2 | HEIGHT: 66.18 IN | TEMPERATURE: 97 F

## 2017-11-17 DIAGNOSIS — C78.7 SECONDARY ADENOCARCINOMA OF LIVER (HCC): ICD-10-CM

## 2017-11-17 DIAGNOSIS — C25.0 MALIGNANT NEOPLASM OF HEAD OF PANCREAS (HCC): Primary | ICD-10-CM

## 2017-11-17 PROCEDURE — 99214 OFFICE O/P EST MOD 30 MIN: CPT | Performed by: INTERNAL MEDICINE

## 2017-11-17 PROCEDURE — 96367 TX/PROPH/DG ADDL SEQ IV INF: CPT

## 2017-11-17 PROCEDURE — 96375 TX/PRO/DX INJ NEW DRUG ADDON: CPT

## 2017-11-17 PROCEDURE — 96365 THER/PROPH/DIAG IV INF INIT: CPT

## 2017-11-17 RX ORDER — LORAZEPAM 0.5 MG/1
TABLET ORAL EVERY 4 HOURS PRN
Status: CANCELLED | OUTPATIENT
Start: 2017-11-17

## 2017-11-17 RX ORDER — METOCLOPRAMIDE HYDROCHLORIDE 5 MG/ML
10 INJECTION INTRAMUSCULAR; INTRAVENOUS EVERY 6 HOURS PRN
Status: CANCELLED | OUTPATIENT
Start: 2017-11-17

## 2017-11-17 RX ORDER — FLUOROURACIL 50 MG/ML
1500 INJECTION, SOLUTION INTRAVENOUS CONTINUOUS
Status: CANCELLED | OUTPATIENT
Start: 2017-11-17

## 2017-11-17 RX ORDER — PROCHLORPERAZINE MALEATE 10 MG
10 TABLET ORAL EVERY 6 HOURS PRN
Status: CANCELLED | OUTPATIENT
Start: 2017-11-17

## 2017-11-17 RX ORDER — FLUOROURACIL 50 MG/ML
1500 INJECTION, SOLUTION INTRAVENOUS CONTINUOUS
Status: DISCONTINUED | OUTPATIENT
Start: 2017-11-17 | End: 2017-11-17

## 2017-11-17 RX ORDER — ONDANSETRON 2 MG/ML
8 INJECTION INTRAMUSCULAR; INTRAVENOUS EVERY 6 HOURS PRN
Status: CANCELLED | OUTPATIENT
Start: 2017-11-17

## 2017-11-17 RX ORDER — LORAZEPAM 2 MG/ML
INJECTION INTRAMUSCULAR EVERY 4 HOURS PRN
Status: CANCELLED | OUTPATIENT
Start: 2017-11-17

## 2017-11-17 RX ADMIN — FLUOROURACIL 2900 MG: 50 INJECTION, SOLUTION INTRAVENOUS at 12:00:00

## 2017-11-17 NOTE — PROGRESS NOTES
Patient is here for MD f/u and cycle 17 of Leuco/5FU. Pt reports she is feeling well. Denies pain. No complaints at present time. Appetite and energy level is good.

## 2017-11-19 ENCOUNTER — NURSE ONLY (OUTPATIENT)
Dept: HEMATOLOGY/ONCOLOGY | Facility: HOSPITAL | Age: 67
End: 2017-11-19
Attending: INTERNAL MEDICINE
Payer: MEDICARE

## 2017-11-19 DIAGNOSIS — C25.0 MALIGNANT NEOPLASM OF HEAD OF PANCREAS (HCC): Primary | ICD-10-CM

## 2017-11-19 NOTE — PROGRESS NOTES
[facility]    PATIENT'S NAME: Chari Crowe   ATTENDING PHYSICIAN: Lucía Blevins M.D.    PATIENT ACCOUNT #: [de-identified] LOCATION: [address] [place] [address] RECORD #: [id number] YOB: 1950   DATE OF SERVICE: 11/17/2017       TANESHA mucositis or significant diarrhea with the last cycle. Her appetite remains quite good.   Her current medications include amoxicillin prior to dental work, calcium, carboxymethylcellulose eye drops, Premarin vaginal cream p.r.n., fluoxetine, latanoprost op other chemotherapeutic agents. The alternative that she would be a candidate for is also gemcitabine and nab-paclitaxel.   She could be retreated with oxaliplatin in the future, though there is certainly a limit as to how many cycles she could tolerate at

## 2017-11-30 ENCOUNTER — SNF/IP PROF CHARGE ONLY (OUTPATIENT)
Dept: HEMATOLOGY/ONCOLOGY | Facility: HOSPITAL | Age: 67
End: 2017-11-30

## 2017-11-30 DIAGNOSIS — C25.0 MALIGNANT NEOPLASM OF HEAD OF PANCREAS (HCC): ICD-10-CM

## 2017-11-30 PROCEDURE — G9678 ONCOLOGY CARE MODEL SERVICE: HCPCS | Performed by: INTERNAL MEDICINE

## 2017-12-07 ENCOUNTER — NURSE ONLY (OUTPATIENT)
Dept: HEMATOLOGY/ONCOLOGY | Facility: HOSPITAL | Age: 67
End: 2017-12-07
Attending: INTERNAL MEDICINE
Payer: MEDICARE

## 2017-12-07 DIAGNOSIS — C25.0 MALIGNANT NEOPLASM OF HEAD OF PANCREAS (HCC): ICD-10-CM

## 2017-12-07 PROCEDURE — 36415 COLL VENOUS BLD VENIPUNCTURE: CPT

## 2017-12-07 PROCEDURE — 82378 CARCINOEMBRYONIC ANTIGEN: CPT

## 2017-12-07 PROCEDURE — 85025 COMPLETE CBC W/AUTO DIFF WBC: CPT

## 2017-12-07 PROCEDURE — 86301 IMMUNOASSAY TUMOR CA 19-9: CPT

## 2017-12-07 PROCEDURE — 80053 COMPREHEN METABOLIC PANEL: CPT

## 2017-12-08 ENCOUNTER — APPOINTMENT (OUTPATIENT)
Dept: HEMATOLOGY/ONCOLOGY | Facility: HOSPITAL | Age: 67
End: 2017-12-08
Attending: INTERNAL MEDICINE
Payer: MEDICARE

## 2017-12-08 VITALS
OXYGEN SATURATION: 98 % | TEMPERATURE: 97 F | DIASTOLIC BLOOD PRESSURE: 83 MMHG | RESPIRATION RATE: 18 BRPM | BODY MASS INDEX: 30.59 KG/M2 | HEIGHT: 66.18 IN | SYSTOLIC BLOOD PRESSURE: 138 MMHG | WEIGHT: 190.38 LBS | HEART RATE: 80 BPM

## 2017-12-08 DIAGNOSIS — C25.0 MALIGNANT NEOPLASM OF HEAD OF PANCREAS (HCC): Primary | ICD-10-CM

## 2017-12-08 DIAGNOSIS — C78.7 SECONDARY ADENOCARCINOMA OF LIVER (HCC): ICD-10-CM

## 2017-12-08 PROCEDURE — 96375 TX/PRO/DX INJ NEW DRUG ADDON: CPT

## 2017-12-08 PROCEDURE — 96367 TX/PROPH/DG ADDL SEQ IV INF: CPT

## 2017-12-08 PROCEDURE — 96374 THER/PROPH/DIAG INJ IV PUSH: CPT

## 2017-12-08 PROCEDURE — 96365 THER/PROPH/DIAG IV INF INIT: CPT

## 2017-12-08 PROCEDURE — 96366 THER/PROPH/DIAG IV INF ADDON: CPT

## 2017-12-08 PROCEDURE — 99214 OFFICE O/P EST MOD 30 MIN: CPT | Performed by: INTERNAL MEDICINE

## 2017-12-08 RX ORDER — LORAZEPAM 0.5 MG/1
TABLET ORAL EVERY 4 HOURS PRN
Status: CANCELLED | OUTPATIENT
Start: 2017-12-08

## 2017-12-08 RX ORDER — FLUOROURACIL 50 MG/ML
1500 INJECTION, SOLUTION INTRAVENOUS CONTINUOUS
Status: DISCONTINUED | OUTPATIENT
Start: 2017-12-08 | End: 2017-12-08

## 2017-12-08 RX ORDER — FLUOROURACIL 50 MG/ML
1500 INJECTION, SOLUTION INTRAVENOUS CONTINUOUS
Status: CANCELLED | OUTPATIENT
Start: 2017-12-08

## 2017-12-08 RX ORDER — LORAZEPAM 2 MG/ML
INJECTION INTRAMUSCULAR EVERY 4 HOURS PRN
Status: CANCELLED | OUTPATIENT
Start: 2017-12-08

## 2017-12-08 RX ORDER — ONDANSETRON 2 MG/ML
8 INJECTION INTRAMUSCULAR; INTRAVENOUS EVERY 6 HOURS PRN
Status: CANCELLED | OUTPATIENT
Start: 2017-12-08

## 2017-12-08 RX ORDER — METOCLOPRAMIDE HYDROCHLORIDE 5 MG/ML
10 INJECTION INTRAMUSCULAR; INTRAVENOUS EVERY 6 HOURS PRN
Status: CANCELLED | OUTPATIENT
Start: 2017-12-08

## 2017-12-08 RX ORDER — PROCHLORPERAZINE MALEATE 10 MG
10 TABLET ORAL EVERY 6 HOURS PRN
Status: CANCELLED | OUTPATIENT
Start: 2017-12-08

## 2017-12-08 RX ADMIN — FLUOROURACIL 2900 MG: 50 INJECTION, SOLUTION INTRAVENOUS at 12:05:00

## 2017-12-08 NOTE — PROGRESS NOTES
Education Record    Learner:  Patient    Disease / Diagnosis: Malignant neoplasm of head of pancreas     Barriers / Limitations:  None   Comments:    Method:  Brief focused and Reinforcement   Comments:    General Topics:  Plan of care reviewed   Comments:

## 2017-12-08 NOTE — PROGRESS NOTES
Patient is here for MD f/u and cycle 18 of Leucovorin/5FU. Patient is feeling well. Appetite and energy level is good. No GI complaints. Feeling well. No complaints.

## 2017-12-10 ENCOUNTER — NURSE ONLY (OUTPATIENT)
Dept: HEMATOLOGY/ONCOLOGY | Facility: HOSPITAL | Age: 67
End: 2017-12-10
Attending: INTERNAL MEDICINE
Payer: MEDICARE

## 2017-12-10 PROCEDURE — 96523 IRRIG DRUG DELIVERY DEVICE: CPT

## 2017-12-11 NOTE — PROGRESS NOTES
[facility]    PATIENT'S NAME: Tita Christian   ATTENDING PHYSICIAN: Flor Brantley M.D.    PATIENT ACCOUNT #: [de-identified] LOCATION: [address] [place] [address] RECORD #: [id number] YOB: 1950   DATE OF SERVICE: 12/08/2017       TANESHA 19-9 did bump up, and so she is somewhat anxious. It went from 23 to 46. Her other labs have all been normal.  She denies any diarrhea. She denies any cough, any shortness of breath. Her energy level is good. She does have fatigue.   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 For now, I have asked her to get a followup CT scan and to repeat her marker next week. If she has evidence of early progression, then we will have the discussion about the optimal timing of these switches.   She was planning on tryi

## 2017-12-13 ENCOUNTER — HOSPITAL ENCOUNTER (OUTPATIENT)
Dept: CT IMAGING | Facility: HOSPITAL | Age: 67
Discharge: HOME | End: 2017-12-13
Attending: INTERNAL MEDICINE
Payer: MEDICARE

## 2017-12-13 DIAGNOSIS — C25.0 MALIGNANT NEOPLASM OF HEAD OF PANCREAS (HCC): ICD-10-CM

## 2017-12-13 PROCEDURE — 71260 CT THORAX DX C+: CPT | Performed by: INTERNAL MEDICINE

## 2017-12-13 PROCEDURE — 74177 CT ABD & PELVIS W/CONTRAST: CPT | Performed by: INTERNAL MEDICINE

## 2017-12-15 ENCOUNTER — NURSE ONLY (OUTPATIENT)
Dept: HEMATOLOGY/ONCOLOGY | Facility: HOSPITAL | Age: 67
End: 2017-12-15
Attending: INTERNAL MEDICINE
Payer: MEDICARE

## 2017-12-15 ENCOUNTER — TELEPHONE (OUTPATIENT)
Dept: HEMATOLOGY/ONCOLOGY | Facility: HOSPITAL | Age: 67
End: 2017-12-15

## 2017-12-15 DIAGNOSIS — C25.0 MALIGNANT NEOPLASM OF HEAD OF PANCREAS (HCC): Primary | ICD-10-CM

## 2017-12-15 DIAGNOSIS — C78.7 SECONDARY ADENOCARCINOMA OF LIVER (HCC): ICD-10-CM

## 2017-12-15 PROCEDURE — 85025 COMPLETE CBC W/AUTO DIFF WBC: CPT

## 2017-12-15 PROCEDURE — 86301 IMMUNOASSAY TUMOR CA 19-9: CPT

## 2017-12-15 PROCEDURE — 36591 DRAW BLOOD OFF VENOUS DEVICE: CPT

## 2017-12-15 PROCEDURE — 82378 CARCINOEMBRYONIC ANTIGEN: CPT

## 2017-12-15 RX ORDER — SODIUM CHLORIDE 0.9 % (FLUSH) 0.9 %
10 SYRINGE (ML) INJECTION ONCE
Status: CANCELLED | OUTPATIENT
Start: 2017-12-15

## 2017-12-15 RX ORDER — SODIUM CHLORIDE 0.9 % (FLUSH) 0.9 %
10 SYRINGE (ML) INJECTION ONCE
Status: COMPLETED | OUTPATIENT
Start: 2017-12-15 | End: 2017-12-15

## 2017-12-15 RX ADMIN — SODIUM CHLORIDE 0.9 % (FLUSH) 10 ML: 0.9 % SYRINGE (ML) INJECTION at 09:50:00

## 2017-12-15 NOTE — TELEPHONE ENCOUNTER
Spoke to patient. Marker is a little lower and CT was ok - stay the course. Leaving town on the 20th. Will return on the 31st.  Will come in on the 2nd and get labs and potentially have chemo on the 5th.    Ab Guerrier MD

## 2017-12-31 ENCOUNTER — SNF/IP PROF CHARGE ONLY (OUTPATIENT)
Dept: HEMATOLOGY/ONCOLOGY | Facility: HOSPITAL | Age: 67
End: 2017-12-31

## 2017-12-31 DIAGNOSIS — C25.0 MALIGNANT NEOPLASM OF HEAD OF PANCREAS (HCC): ICD-10-CM

## 2017-12-31 PROCEDURE — G9678 ONCOLOGY CARE MODEL SERVICE: HCPCS | Performed by: INTERNAL MEDICINE

## 2018-01-02 ENCOUNTER — NURSE ONLY (OUTPATIENT)
Dept: HEMATOLOGY/ONCOLOGY | Facility: HOSPITAL | Age: 68
End: 2018-01-02
Attending: INTERNAL MEDICINE
Payer: MEDICARE

## 2018-01-02 DIAGNOSIS — C25.0 MALIGNANT NEOPLASM OF HEAD OF PANCREAS (HCC): Primary | ICD-10-CM

## 2018-01-02 DIAGNOSIS — C78.7 SECONDARY ADENOCARCINOMA OF LIVER (HCC): ICD-10-CM

## 2018-01-02 LAB
ALBUMIN SERPL-MCNC: 3.3 G/DL (ref 3.5–4.8)
ALP LIVER SERPL-CCNC: 96 U/L (ref 55–142)
ALT SERPL-CCNC: 24 U/L (ref 14–54)
AST SERPL-CCNC: 18 U/L (ref 15–41)
BASOPHILS # BLD AUTO: 0.04 X10(3) UL (ref 0–0.1)
BASOPHILS NFR BLD AUTO: 1 %
BILIRUB SERPL-MCNC: 0.4 MG/DL (ref 0.1–2)
BUN BLD-MCNC: 21 MG/DL (ref 8–20)
CALCIUM BLD-MCNC: 9.3 MG/DL (ref 8.3–10.3)
CARBOHYDRATE AG 19-9: 36.4 U/ML (ref 1.2–35)
CEA: 3.1 NG/ML (ref 0.5–5)
CHLORIDE: 106 MMOL/L (ref 101–111)
CO2: 26 MMOL/L (ref 22–32)
CREAT BLD-MCNC: 0.8 MG/DL (ref 0.55–1.02)
EOSINOPHIL # BLD AUTO: 0.11 X10(3) UL (ref 0–0.3)
EOSINOPHIL NFR BLD AUTO: 2.8 %
ERYTHROCYTE [DISTWIDTH] IN BLOOD BY AUTOMATED COUNT: 12.2 % (ref 11.5–16)
GLUCOSE BLD-MCNC: 82 MG/DL (ref 70–99)
HCT VFR BLD AUTO: 41.6 % (ref 34–50)
HGB BLD-MCNC: 14.1 G/DL (ref 12–16)
IMMATURE GRANULOCYTE COUNT: 0 X10(3) UL (ref 0–1)
IMMATURE GRANULOCYTE RATIO %: 0 %
LYMPHOCYTES # BLD AUTO: 1.95 X10(3) UL (ref 0.9–4)
LYMPHOCYTES NFR BLD AUTO: 48.9 %
M PROTEIN MFR SERPL ELPH: 6.8 G/DL (ref 6.1–8.3)
MCH RBC QN AUTO: 31.9 PG (ref 27–33.2)
MCHC RBC AUTO-ENTMCNC: 33.9 G/DL (ref 31–37)
MCV RBC AUTO: 94.1 FL (ref 81–100)
MONOCYTES # BLD AUTO: 0.33 X10(3) UL (ref 0.1–0.6)
MONOCYTES NFR BLD AUTO: 8.3 %
NEUTROPHIL ABS PRELIM: 1.56 X10 (3) UL (ref 1.3–6.7)
NEUTROPHILS # BLD AUTO: 1.56 X10(3) UL (ref 1.3–6.7)
NEUTROPHILS NFR BLD AUTO: 39 %
PLATELET # BLD AUTO: 233 10(3)UL (ref 150–450)
POTASSIUM SERPL-SCNC: 4.1 MMOL/L (ref 3.6–5.1)
RBC # BLD AUTO: 4.42 X10(6)UL (ref 3.8–5.1)
RED CELL DISTRIBUTION WIDTH-SD: 42.5 FL (ref 35.1–46.3)
SODIUM SERPL-SCNC: 140 MMOL/L (ref 136–144)
WBC # BLD AUTO: 4 X10(3) UL (ref 4–13)

## 2018-01-02 PROCEDURE — 36591 DRAW BLOOD OFF VENOUS DEVICE: CPT

## 2018-01-02 PROCEDURE — 82378 CARCINOEMBRYONIC ANTIGEN: CPT

## 2018-01-02 PROCEDURE — 85025 COMPLETE CBC W/AUTO DIFF WBC: CPT

## 2018-01-02 PROCEDURE — 86301 IMMUNOASSAY TUMOR CA 19-9: CPT

## 2018-01-02 PROCEDURE — 80053 COMPREHEN METABOLIC PANEL: CPT

## 2018-01-02 RX ORDER — SODIUM CHLORIDE 0.9 % (FLUSH) 0.9 %
10 SYRINGE (ML) INJECTION ONCE
Status: COMPLETED | OUTPATIENT
Start: 2018-01-02 | End: 2018-01-02

## 2018-01-02 RX ORDER — SODIUM CHLORIDE 0.9 % (FLUSH) 0.9 %
10 SYRINGE (ML) INJECTION ONCE
Status: CANCELLED | OUTPATIENT
Start: 2018-01-02

## 2018-01-02 RX ADMIN — SODIUM CHLORIDE 0.9 % (FLUSH) 10 ML: 0.9 % SYRINGE (ML) INJECTION at 08:20:00

## 2018-01-10 ENCOUNTER — TELEPHONE (OUTPATIENT)
Dept: HEMATOLOGY/ONCOLOGY | Facility: HOSPITAL | Age: 68
End: 2018-01-10

## 2018-01-11 ENCOUNTER — NURSE ONLY (OUTPATIENT)
Dept: HEMATOLOGY/ONCOLOGY | Facility: HOSPITAL | Age: 68
End: 2018-01-11
Attending: INTERNAL MEDICINE
Payer: MEDICARE

## 2018-01-11 DIAGNOSIS — C25.0 MALIGNANT NEOPLASM OF HEAD OF PANCREAS (HCC): Primary | ICD-10-CM

## 2018-01-11 LAB
ALBUMIN SERPL-MCNC: 3.4 G/DL (ref 3.5–4.8)
ALP LIVER SERPL-CCNC: 107 U/L (ref 55–142)
ALT SERPL-CCNC: 43 U/L (ref 14–54)
AST SERPL-CCNC: 31 U/L (ref 15–41)
BASOPHILS # BLD AUTO: 0.04 X10(3) UL (ref 0–0.1)
BASOPHILS NFR BLD AUTO: 0.9 %
BILIRUB SERPL-MCNC: 0.3 MG/DL (ref 0.1–2)
BUN BLD-MCNC: 17 MG/DL (ref 8–20)
CALCIUM BLD-MCNC: 9.3 MG/DL (ref 8.3–10.3)
CARBOHYDRATE AG 19-9: 52.1 U/ML (ref 1.2–35)
CEA: 3.8 NG/ML (ref 0.5–5)
CHLORIDE: 104 MMOL/L (ref 101–111)
CO2: 26 MMOL/L (ref 22–32)
CREAT BLD-MCNC: 0.75 MG/DL (ref 0.55–1.02)
EOSINOPHIL # BLD AUTO: 0.11 X10(3) UL (ref 0–0.3)
EOSINOPHIL NFR BLD AUTO: 2.5 %
ERYTHROCYTE [DISTWIDTH] IN BLOOD BY AUTOMATED COUNT: 12.4 % (ref 11.5–16)
GLUCOSE BLD-MCNC: 79 MG/DL (ref 70–99)
HCT VFR BLD AUTO: 41.3 % (ref 34–50)
HGB BLD-MCNC: 14 G/DL (ref 12–16)
IMMATURE GRANULOCYTE COUNT: 0.01 X10(3) UL (ref 0–1)
IMMATURE GRANULOCYTE RATIO %: 0.2 %
LYMPHOCYTES # BLD AUTO: 1.88 X10(3) UL (ref 0.9–4)
LYMPHOCYTES NFR BLD AUTO: 43.1 %
M PROTEIN MFR SERPL ELPH: 6.9 G/DL (ref 6.1–8.3)
MCH RBC QN AUTO: 31.9 PG (ref 27–33.2)
MCHC RBC AUTO-ENTMCNC: 33.9 G/DL (ref 31–37)
MCV RBC AUTO: 94.1 FL (ref 81–100)
MONOCYTES # BLD AUTO: 0.32 X10(3) UL (ref 0.1–0.6)
MONOCYTES NFR BLD AUTO: 7.3 %
NEUTROPHIL ABS PRELIM: 2 X10 (3) UL (ref 1.3–6.7)
NEUTROPHILS # BLD AUTO: 2 X10(3) UL (ref 1.3–6.7)
NEUTROPHILS NFR BLD AUTO: 46 %
PLATELET # BLD AUTO: 241 10(3)UL (ref 150–450)
POTASSIUM SERPL-SCNC: 4.4 MMOL/L (ref 3.6–5.1)
RBC # BLD AUTO: 4.39 X10(6)UL (ref 3.8–5.1)
RED CELL DISTRIBUTION WIDTH-SD: 42.9 FL (ref 35.1–46.3)
SODIUM SERPL-SCNC: 138 MMOL/L (ref 136–144)
WBC # BLD AUTO: 4.4 X10(3) UL (ref 4–13)

## 2018-01-11 PROCEDURE — 85025 COMPLETE CBC W/AUTO DIFF WBC: CPT

## 2018-01-11 PROCEDURE — 82378 CARCINOEMBRYONIC ANTIGEN: CPT

## 2018-01-11 PROCEDURE — 86301 IMMUNOASSAY TUMOR CA 19-9: CPT

## 2018-01-11 PROCEDURE — 36415 COLL VENOUS BLD VENIPUNCTURE: CPT

## 2018-01-11 PROCEDURE — 80053 COMPREHEN METABOLIC PANEL: CPT

## 2018-01-12 ENCOUNTER — OFFICE VISIT (OUTPATIENT)
Dept: HEMATOLOGY/ONCOLOGY | Facility: HOSPITAL | Age: 68
End: 2018-01-12
Attending: INTERNAL MEDICINE
Payer: MEDICARE

## 2018-01-12 VITALS
SYSTOLIC BLOOD PRESSURE: 147 MMHG | WEIGHT: 194.81 LBS | DIASTOLIC BLOOD PRESSURE: 88 MMHG | OXYGEN SATURATION: 99 % | BODY MASS INDEX: 31.31 KG/M2 | RESPIRATION RATE: 18 BRPM | HEART RATE: 64 BPM | TEMPERATURE: 99 F | HEIGHT: 66.18 IN

## 2018-01-12 DIAGNOSIS — C25.0 MALIGNANT NEOPLASM OF HEAD OF PANCREAS (HCC): Primary | ICD-10-CM

## 2018-01-12 PROCEDURE — 96367 TX/PROPH/DG ADDL SEQ IV INF: CPT

## 2018-01-12 PROCEDURE — 96374 THER/PROPH/DIAG INJ IV PUSH: CPT

## 2018-01-12 PROCEDURE — 99214 OFFICE O/P EST MOD 30 MIN: CPT | Performed by: NURSE PRACTITIONER

## 2018-01-12 RX ORDER — ONDANSETRON 2 MG/ML
8 INJECTION INTRAMUSCULAR; INTRAVENOUS EVERY 6 HOURS PRN
Status: CANCELLED | OUTPATIENT
Start: 2018-01-12

## 2018-01-12 RX ORDER — LORAZEPAM 2 MG/ML
INJECTION INTRAMUSCULAR EVERY 4 HOURS PRN
Status: CANCELLED | OUTPATIENT
Start: 2018-01-12

## 2018-01-12 RX ORDER — METOCLOPRAMIDE HYDROCHLORIDE 5 MG/ML
10 INJECTION INTRAMUSCULAR; INTRAVENOUS EVERY 6 HOURS PRN
Status: CANCELLED | OUTPATIENT
Start: 2018-01-12

## 2018-01-12 RX ORDER — FLUOROURACIL 50 MG/ML
1500 INJECTION, SOLUTION INTRAVENOUS CONTINUOUS
Status: DISCONTINUED | OUTPATIENT
Start: 2018-01-12 | End: 2018-01-12

## 2018-01-12 RX ORDER — FLUOROURACIL 50 MG/ML
1500 INJECTION, SOLUTION INTRAVENOUS CONTINUOUS
Status: CANCELLED | OUTPATIENT
Start: 2018-01-12

## 2018-01-12 RX ORDER — PROCHLORPERAZINE MALEATE 10 MG
10 TABLET ORAL EVERY 6 HOURS PRN
Status: CANCELLED | OUTPATIENT
Start: 2018-01-12

## 2018-01-12 RX ORDER — LORAZEPAM 0.5 MG/1
TABLET ORAL EVERY 4 HOURS PRN
Status: CANCELLED | OUTPATIENT
Start: 2018-01-12

## 2018-01-12 RX ADMIN — FLUOROURACIL 2900 MG: 50 INJECTION, SOLUTION INTRAVENOUS at 11:20:00

## 2018-01-12 NOTE — PROGRESS NOTES
Education Record    Learner:  Patient and Family Member    Disease / Diagnosis:    Barriers / Limitations:  None   Comments:    Method:  Discussion and Reinforcement   Comments:    General Topics:  Diet, Infection, Medication, Pain, Precautions, Procedure,

## 2018-01-12 NOTE — PROGRESS NOTES
ANP Visit Note    Patient Name: Vale Sharp   YOB: 1950   Medical Record Number: [id number]   CSN: [id number]   Date of visit: 1/12/2018       Chief Complaint/Reason for Visit: Follow up/ metastatic pancreatic cancer    Oncologic His has no complaints today and was inquiring about stopping treatment. She is planning on going on a trip in March to visit [facility] where her daughter practices medicine as a pediatrician. She states she has no fatigue and feeling great. She denies pain. needed. , Disp: , Rfl:   •  lisinopril 10 MG Oral Tab, Take 10 mg by mouth., Disp: , Rfl:   •  Metoprolol Succinate ER 25 MG Oral Tablet 24 Hr, , Disp: , Rfl:   •  Estrogens, Conjugated (PREMARIN) 0.625 MG/GM Vaginal Cream, Place 0.5 g vaginally. , Disp: , R Date: 01/11/2018  Value: 3.8         Ref range: 0.5 - 5.0 ng/mL    Status: Final  Glucose                                       Date: 01/11/2018  Value: 79          Ref range: 70 - 99 mg/dL      Status: Final  BUN Date: 01/11/2018  Value: 26.0        Ref range: 22.0 - 32.0 mmol*  Status: Final  WBC                                           Date: 01/11/2018  Value: 4.4         Ref range: 4.0 - 13.0 x10(3*  Status: Final  RBC 0.11        Ref range: 0.00 - 0.30 x10(*  Status: Final  Basophil Absolute                             Date: 01/11/2018  Value: 0.04        Ref range: 0.00 - 0.10 x10(*  Status: Final  Immature Granulocyte Absolute                 Date: 01/11/2018  Value:

## 2018-01-12 NOTE — PROGRESS NOTES
Patient presents with:  Chemotherapy: APN assessment prior to treatment    Pt has no complaints, she states she is exercising, eating well and travelled over the holidays.      Education Record    Learner:  Patient, Spouse and Family Member    Disease / Aishwarya Bryson

## 2018-01-14 ENCOUNTER — NURSE ONLY (OUTPATIENT)
Dept: HEMATOLOGY/ONCOLOGY | Facility: HOSPITAL | Age: 68
End: 2018-01-14
Attending: INTERNAL MEDICINE
Payer: MEDICARE

## 2018-01-14 DIAGNOSIS — C25.0 MALIGNANT NEOPLASM OF HEAD OF PANCREAS (HCC): Primary | ICD-10-CM

## 2018-01-14 PROCEDURE — 96523 IRRIG DRUG DELIVERY DEVICE: CPT

## 2018-01-14 RX ORDER — SODIUM CHLORIDE 0.9 % (FLUSH) 0.9 %
10 SYRINGE (ML) INJECTION ONCE
Status: CANCELLED | OUTPATIENT
Start: 2018-01-14

## 2018-01-14 RX ORDER — SODIUM CHLORIDE 0.9 % (FLUSH) 0.9 %
10 SYRINGE (ML) INJECTION ONCE
Status: COMPLETED | OUTPATIENT
Start: 2018-01-14 | End: 2018-01-14

## 2018-01-14 RX ADMIN — SODIUM CHLORIDE 0.9 % (FLUSH) 10 ML: 0.9 % SYRINGE (ML) INJECTION at 09:45:00

## 2018-02-01 ENCOUNTER — NURSE ONLY (OUTPATIENT)
Dept: HEMATOLOGY/ONCOLOGY | Facility: HOSPITAL | Age: 68
End: 2018-02-01
Attending: INTERNAL MEDICINE
Payer: MEDICARE

## 2018-02-01 DIAGNOSIS — C25.0 MALIGNANT NEOPLASM OF HEAD OF PANCREAS (HCC): ICD-10-CM

## 2018-02-01 LAB
ALBUMIN SERPL-MCNC: 3.4 G/DL (ref 3.5–4.8)
ALP LIVER SERPL-CCNC: 118 U/L (ref 55–142)
ALT SERPL-CCNC: 37 U/L (ref 14–54)
AST SERPL-CCNC: 23 U/L (ref 15–41)
BASOPHILS # BLD AUTO: 0.03 X10(3) UL (ref 0–0.1)
BASOPHILS NFR BLD AUTO: 0.6 %
BILIRUB SERPL-MCNC: 0.4 MG/DL (ref 0.1–2)
BUN BLD-MCNC: 24 MG/DL (ref 8–20)
CALCIUM BLD-MCNC: 8.9 MG/DL (ref 8.3–10.3)
CARBOHYDRATE AG 19-9: 37.5 U/ML (ref 1.2–35)
CEA: 3.7 NG/ML (ref 0.5–5)
CHLORIDE: 107 MMOL/L (ref 101–111)
CO2: 25 MMOL/L (ref 22–32)
CREAT BLD-MCNC: 0.73 MG/DL (ref 0.55–1.02)
EOSINOPHIL # BLD AUTO: 0.1 X10(3) UL (ref 0–0.3)
EOSINOPHIL NFR BLD AUTO: 2 %
ERYTHROCYTE [DISTWIDTH] IN BLOOD BY AUTOMATED COUNT: 12.8 % (ref 11.5–16)
GLUCOSE BLD-MCNC: 81 MG/DL (ref 70–99)
HCT VFR BLD AUTO: 41 % (ref 34–50)
HGB BLD-MCNC: 13.6 G/DL (ref 12–16)
IMMATURE GRANULOCYTE COUNT: 0.01 X10(3) UL (ref 0–1)
IMMATURE GRANULOCYTE RATIO %: 0.2 %
LYMPHOCYTES # BLD AUTO: 2.41 X10(3) UL (ref 0.9–4)
LYMPHOCYTES NFR BLD AUTO: 47.1 %
M PROTEIN MFR SERPL ELPH: 6.8 G/DL (ref 6.1–8.3)
MCH RBC QN AUTO: 30.5 PG (ref 27–33.2)
MCHC RBC AUTO-ENTMCNC: 33.2 G/DL (ref 31–37)
MCV RBC AUTO: 91.9 FL (ref 81–100)
MONOCYTES # BLD AUTO: 0.53 X10(3) UL (ref 0.1–0.6)
MONOCYTES NFR BLD AUTO: 10.4 %
NEUTROPHIL ABS PRELIM: 2.04 X10 (3) UL (ref 1.3–6.7)
NEUTROPHILS # BLD AUTO: 2.04 X10(3) UL (ref 1.3–6.7)
NEUTROPHILS NFR BLD AUTO: 39.7 %
PLATELET # BLD AUTO: 252 10(3)UL (ref 150–450)
POTASSIUM SERPL-SCNC: 4.4 MMOL/L (ref 3.6–5.1)
RBC # BLD AUTO: 4.46 X10(6)UL (ref 3.8–5.1)
RED CELL DISTRIBUTION WIDTH-SD: 42.8 FL (ref 35.1–46.3)
SODIUM SERPL-SCNC: 138 MMOL/L (ref 136–144)
WBC # BLD AUTO: 5.1 X10(3) UL (ref 4–13)

## 2018-02-01 PROCEDURE — 36415 COLL VENOUS BLD VENIPUNCTURE: CPT

## 2018-02-01 PROCEDURE — 82378 CARCINOEMBRYONIC ANTIGEN: CPT

## 2018-02-01 PROCEDURE — 80053 COMPREHEN METABOLIC PANEL: CPT

## 2018-02-01 PROCEDURE — 85025 COMPLETE CBC W/AUTO DIFF WBC: CPT

## 2018-02-01 PROCEDURE — 86301 IMMUNOASSAY TUMOR CA 19-9: CPT

## 2018-02-02 ENCOUNTER — OFFICE VISIT (OUTPATIENT)
Dept: HEMATOLOGY/ONCOLOGY | Facility: HOSPITAL | Age: 68
End: 2018-02-02
Attending: INTERNAL MEDICINE
Payer: MEDICARE

## 2018-02-02 VITALS
OXYGEN SATURATION: 96 % | WEIGHT: 194 LBS | BODY MASS INDEX: 31.18 KG/M2 | TEMPERATURE: 96 F | HEART RATE: 76 BPM | HEIGHT: 66.18 IN | RESPIRATION RATE: 18 BRPM | SYSTOLIC BLOOD PRESSURE: 156 MMHG | DIASTOLIC BLOOD PRESSURE: 82 MMHG

## 2018-02-02 DIAGNOSIS — C25.0 MALIGNANT NEOPLASM OF HEAD OF PANCREAS (HCC): Primary | ICD-10-CM

## 2018-02-02 DIAGNOSIS — C78.7 SECONDARY ADENOCARCINOMA OF LIVER (HCC): ICD-10-CM

## 2018-02-02 PROCEDURE — 99214 OFFICE O/P EST MOD 30 MIN: CPT | Performed by: INTERNAL MEDICINE

## 2018-02-02 PROCEDURE — 96374 THER/PROPH/DIAG INJ IV PUSH: CPT

## 2018-02-02 PROCEDURE — 96375 TX/PRO/DX INJ NEW DRUG ADDON: CPT

## 2018-02-02 PROCEDURE — 96367 TX/PROPH/DG ADDL SEQ IV INF: CPT

## 2018-02-02 RX ORDER — LORAZEPAM 2 MG/ML
INJECTION INTRAMUSCULAR EVERY 4 HOURS PRN
Status: CANCELLED | OUTPATIENT
Start: 2018-02-02

## 2018-02-02 RX ORDER — METOCLOPRAMIDE HYDROCHLORIDE 5 MG/ML
10 INJECTION INTRAMUSCULAR; INTRAVENOUS EVERY 6 HOURS PRN
Status: CANCELLED | OUTPATIENT
Start: 2018-02-02

## 2018-02-02 RX ORDER — PROCHLORPERAZINE MALEATE 10 MG
10 TABLET ORAL EVERY 6 HOURS PRN
Status: CANCELLED | OUTPATIENT
Start: 2018-02-02

## 2018-02-02 RX ORDER — ONDANSETRON 2 MG/ML
8 INJECTION INTRAMUSCULAR; INTRAVENOUS EVERY 6 HOURS PRN
Status: CANCELLED | OUTPATIENT
Start: 2018-02-02

## 2018-02-02 RX ORDER — FLUOROURACIL 50 MG/ML
1500 INJECTION, SOLUTION INTRAVENOUS CONTINUOUS
Status: DISCONTINUED | OUTPATIENT
Start: 2018-02-02 | End: 2018-02-02

## 2018-02-02 RX ORDER — FLUOROURACIL 50 MG/ML
1500 INJECTION, SOLUTION INTRAVENOUS CONTINUOUS
Status: CANCELLED | OUTPATIENT
Start: 2018-02-02

## 2018-02-02 RX ORDER — LORAZEPAM 0.5 MG/1
TABLET ORAL EVERY 4 HOURS PRN
Status: CANCELLED | OUTPATIENT
Start: 2018-02-02

## 2018-02-02 RX ADMIN — FLUOROURACIL 2900 MG: 50 INJECTION, SOLUTION INTRAVENOUS at 12:50:00

## 2018-02-02 NOTE — PROGRESS NOTES
Education Record    Learner:  Patient    Disease / Diagnosis:    Barriers / Limitations:  None   Comments:    Method:  Discussion   Comments:    General Topics:  Plan of care reviewed and Fall risk and prevention   Comments:    Outcome:  Shows understandin

## 2018-02-02 NOTE — PROGRESS NOTES
Patient is here for MD f/u and next cycle of Leuco/5FU. Pt reports she is feeling well. Denies pain. Appetite and energy level is good. No complaints.

## 2018-02-04 ENCOUNTER — NURSE ONLY (OUTPATIENT)
Dept: HEMATOLOGY/ONCOLOGY | Facility: HOSPITAL | Age: 68
End: 2018-02-04
Attending: INTERNAL MEDICINE
Payer: MEDICARE

## 2018-02-04 DIAGNOSIS — C25.0 MALIGNANT NEOPLASM OF HEAD OF PANCREAS (HCC): Primary | ICD-10-CM

## 2018-02-04 PROCEDURE — 96523 IRRIG DRUG DELIVERY DEVICE: CPT

## 2018-02-04 RX ORDER — SODIUM CHLORIDE 0.9 % (FLUSH) 0.9 %
10 SYRINGE (ML) INJECTION ONCE
Status: CANCELLED | OUTPATIENT
Start: 2018-02-04

## 2018-02-04 RX ORDER — SODIUM CHLORIDE 0.9 % (FLUSH) 0.9 %
10 SYRINGE (ML) INJECTION ONCE
Status: COMPLETED | OUTPATIENT
Start: 2018-02-04 | End: 2018-02-04

## 2018-02-04 RX ADMIN — SODIUM CHLORIDE 0.9 % (FLUSH) 10 ML: 0.9 % SYRINGE (ML) INJECTION at 09:50:00

## 2018-02-05 NOTE — PROGRESS NOTES
[facility]    PATIENT'S NAME: Kenroy Azul   ATTENDING PHYSICIAN: Oneil Reed M.D.    PATIENT ACCOUNT #: [de-identified] LOCATION: [address] [place] [address] RECORD #: [id number] YOB: 1950   DATE OF SERVICE: 02/02/2018       TANESHA 10 mg daily, metoprolol 25 mg extended release daily, ondansetron 8 mg q.8 h. p.r.n., prochlorperazine 10 mg q.6 h. p.r.n. PHYSICAL EXAMINATION:    GENERAL:  She is a well-developed, well-nourished female in no acute distress.   VITAL SIGNS:  Her perform

## 2018-02-22 ENCOUNTER — NURSE ONLY (OUTPATIENT)
Dept: HEMATOLOGY/ONCOLOGY | Facility: HOSPITAL | Age: 68
End: 2018-02-22
Attending: INTERNAL MEDICINE
Payer: MEDICARE

## 2018-02-22 DIAGNOSIS — C25.0 MALIGNANT NEOPLASM OF HEAD OF PANCREAS (HCC): ICD-10-CM

## 2018-02-22 LAB
ALBUMIN SERPL-MCNC: 3.5 G/DL (ref 3.5–4.8)
ALP LIVER SERPL-CCNC: 115 U/L (ref 55–142)
ALT SERPL-CCNC: 26 U/L (ref 14–54)
AST SERPL-CCNC: 19 U/L (ref 15–41)
BASOPHILS # BLD AUTO: 0.02 X10(3) UL (ref 0–0.1)
BASOPHILS NFR BLD AUTO: 0.5 %
BILIRUB SERPL-MCNC: 0.5 MG/DL (ref 0.1–2)
BUN BLD-MCNC: 20 MG/DL (ref 8–20)
CALCIUM BLD-MCNC: 9.3 MG/DL (ref 8.3–10.3)
CARBOHYDRATE AG 19-9: 43.7 U/ML (ref 1.2–35)
CEA: 4 NG/ML (ref 0.5–5)
CHLORIDE: 106 MMOL/L (ref 101–111)
CO2: 27 MMOL/L (ref 22–32)
CREAT BLD-MCNC: 0.82 MG/DL (ref 0.55–1.02)
EOSINOPHIL # BLD AUTO: 0.08 X10(3) UL (ref 0–0.3)
EOSINOPHIL NFR BLD AUTO: 2 %
ERYTHROCYTE [DISTWIDTH] IN BLOOD BY AUTOMATED COUNT: 13.2 % (ref 11.5–16)
GLUCOSE BLD-MCNC: 89 MG/DL (ref 70–99)
HCT VFR BLD AUTO: 42.8 % (ref 34–50)
HGB BLD-MCNC: 14.4 G/DL (ref 12–16)
IMMATURE GRANULOCYTE COUNT: 0 X10(3) UL (ref 0–1)
IMMATURE GRANULOCYTE RATIO %: 0 %
LYMPHOCYTES # BLD AUTO: 1.64 X10(3) UL (ref 0.9–4)
LYMPHOCYTES NFR BLD AUTO: 40.5 %
M PROTEIN MFR SERPL ELPH: 7 G/DL (ref 6.1–8.3)
MCH RBC QN AUTO: 31.4 PG (ref 27–33.2)
MCHC RBC AUTO-ENTMCNC: 33.6 G/DL (ref 31–37)
MCV RBC AUTO: 93.4 FL (ref 81–100)
MONOCYTES # BLD AUTO: 0.33 X10(3) UL (ref 0.1–1)
MONOCYTES NFR BLD AUTO: 8.1 %
NEUTROPHIL ABS PRELIM: 1.98 X10 (3) UL (ref 1.3–6.7)
NEUTROPHILS # BLD AUTO: 1.98 X10(3) UL (ref 1.3–6.7)
NEUTROPHILS NFR BLD AUTO: 48.9 %
PLATELET # BLD AUTO: 248 10(3)UL (ref 150–450)
POTASSIUM SERPL-SCNC: 4.2 MMOL/L (ref 3.6–5.1)
RBC # BLD AUTO: 4.58 X10(6)UL (ref 3.8–5.1)
RED CELL DISTRIBUTION WIDTH-SD: 44.8 FL (ref 35.1–46.3)
SODIUM SERPL-SCNC: 140 MMOL/L (ref 136–144)
WBC # BLD AUTO: 4.1 X10(3) UL (ref 4–13)

## 2018-02-22 PROCEDURE — 85025 COMPLETE CBC W/AUTO DIFF WBC: CPT

## 2018-02-22 PROCEDURE — 80053 COMPREHEN METABOLIC PANEL: CPT

## 2018-02-22 PROCEDURE — 86301 IMMUNOASSAY TUMOR CA 19-9: CPT

## 2018-02-22 PROCEDURE — 36415 COLL VENOUS BLD VENIPUNCTURE: CPT

## 2018-02-22 PROCEDURE — 82378 CARCINOEMBRYONIC ANTIGEN: CPT

## 2018-02-23 ENCOUNTER — OFFICE VISIT (OUTPATIENT)
Dept: HEMATOLOGY/ONCOLOGY | Facility: HOSPITAL | Age: 68
End: 2018-02-23
Attending: INTERNAL MEDICINE
Payer: MEDICARE

## 2018-02-23 VITALS
WEIGHT: 194 LBS | HEART RATE: 81 BPM | SYSTOLIC BLOOD PRESSURE: 126 MMHG | TEMPERATURE: 96 F | DIASTOLIC BLOOD PRESSURE: 86 MMHG | OXYGEN SATURATION: 96 % | HEIGHT: 66.18 IN | BODY MASS INDEX: 31.18 KG/M2 | RESPIRATION RATE: 18 BRPM

## 2018-02-23 DIAGNOSIS — C25.0 MALIGNANT NEOPLASM OF HEAD OF PANCREAS (HCC): Primary | ICD-10-CM

## 2018-02-23 DIAGNOSIS — Z51.11 ENCOUNTER FOR CHEMOTHERAPY MANAGEMENT: ICD-10-CM

## 2018-02-23 DIAGNOSIS — C78.7 SECONDARY ADENOCARCINOMA OF LIVER (HCC): ICD-10-CM

## 2018-02-23 PROCEDURE — 99214 OFFICE O/P EST MOD 30 MIN: CPT | Performed by: CLINICAL NURSE SPECIALIST

## 2018-02-23 PROCEDURE — 96374 THER/PROPH/DIAG INJ IV PUSH: CPT

## 2018-02-23 RX ORDER — LORAZEPAM 2 MG/ML
INJECTION INTRAMUSCULAR EVERY 4 HOURS PRN
Status: CANCELLED | OUTPATIENT
Start: 2018-02-23

## 2018-02-23 RX ORDER — PROCHLORPERAZINE MALEATE 10 MG
10 TABLET ORAL EVERY 6 HOURS PRN
Status: CANCELLED | OUTPATIENT
Start: 2018-02-23

## 2018-02-23 RX ORDER — LORAZEPAM 0.5 MG/1
TABLET ORAL EVERY 4 HOURS PRN
Status: CANCELLED | OUTPATIENT
Start: 2018-02-23

## 2018-02-23 RX ORDER — FLUOROURACIL 50 MG/ML
1500 INJECTION, SOLUTION INTRAVENOUS CONTINUOUS
Status: DISCONTINUED | OUTPATIENT
Start: 2018-02-23 | End: 2018-02-23

## 2018-02-23 RX ORDER — ONDANSETRON 2 MG/ML
8 INJECTION INTRAMUSCULAR; INTRAVENOUS EVERY 6 HOURS PRN
Status: CANCELLED | OUTPATIENT
Start: 2018-02-23

## 2018-02-23 RX ORDER — FLUOROURACIL 50 MG/ML
1500 INJECTION, SOLUTION INTRAVENOUS CONTINUOUS
Status: CANCELLED | OUTPATIENT
Start: 2018-02-23

## 2018-02-23 RX ORDER — METOCLOPRAMIDE HYDROCHLORIDE 5 MG/ML
10 INJECTION INTRAMUSCULAR; INTRAVENOUS EVERY 6 HOURS PRN
Status: CANCELLED | OUTPATIENT
Start: 2018-02-23

## 2018-02-23 RX ADMIN — FLUOROURACIL 2900 MG: 50 INJECTION, SOLUTION INTRAVENOUS at 13:24:00

## 2018-02-23 NOTE — PROGRESS NOTES
ANP Visit Note    Patient Name: Aquilino Brennan   YOB: 1950   Medical Record Number: [id number]   CSN: [id number]   Date of visit: 2/23/2018       Chief Complaint/Reason for Visit:  Metastatic Pancreatic Cancer     Oncologic History:   Tammie Orellana Surgical History:  Past Surgical History:  07/03/2017: CATARACT Right  2005: COLONOSCOPY      Comment: small adenomatous polyp  2/10/16: COLONOSCOPY & POLYPECTOMY      Comment: diverticulosis and small polyp removed; ASC  2/10/2016: Cuca Yung Tab, , Disp: , Rfl:   •  latanoprost 0.005 % Ophthalmic Solution, 1 drop., Disp: , Rfl:     Review of Systems:  A comprehensive 14 point review of systems was completed. Pertinent positives and negatives noted in the the HPI.     Performance Status:    y Ref range: >=60               Status: Final  Calcium, Total                                Date: 02/22/2018  Value: 9.3         Ref range: 8.3 - 10.3 mg/dL   Status: Final  Alkaline Phosphatase                          Date: 02/22/2018  Value: 115 Status: Final  PLT                                           Date: 02/22/2018  Value: 248. 0       Ref range: 150.0 - 450.0 10*  Status: Final  MCV                                           Date: 02/22/2018  Value: 93.4        Ref range: 81.0 - 100.0 fL Date: 02/22/2018  Value: 8.1         Ref range: %                  Status: Final  Eosinophil %                                  Date: 02/22/2018  Value: 2.0         Ref range: %                  Status: Final  Basophil %

## 2018-02-23 NOTE — PROGRESS NOTES
Education Record    Learner:  Patient and Spouse    Disease / Diagnosis: Pancreatic Cancer    Barriers / Limitations:  None   Comments:    Method:  Brief focused   Comments:    General Topics:  Medication, Side effects and symptom management and Plan of ca

## 2018-02-23 NOTE — PROGRESS NOTES
Patient presents with:  Chemotherapy: APN assessment prior to treatment    Pt is here for treatment and states she is doing well. Denies N,V,D,C, pain, fever or chills.     Education Record    Learner:  Patient and Spouse    Disease / Diagnosis: metastatic

## 2018-02-25 ENCOUNTER — NURSE ONLY (OUTPATIENT)
Dept: HEMATOLOGY/ONCOLOGY | Facility: HOSPITAL | Age: 68
End: 2018-02-25
Attending: INTERNAL MEDICINE
Payer: MEDICARE

## 2018-02-25 DIAGNOSIS — C25.0 MALIGNANT NEOPLASM OF HEAD OF PANCREAS (HCC): Primary | ICD-10-CM

## 2018-02-25 PROCEDURE — 96523 IRRIG DRUG DELIVERY DEVICE: CPT

## 2018-02-25 RX ORDER — SODIUM CHLORIDE 0.9 % (FLUSH) 0.9 %
10 SYRINGE (ML) INJECTION ONCE
Status: COMPLETED | OUTPATIENT
Start: 2018-02-25 | End: 2018-02-25

## 2018-02-25 RX ORDER — SODIUM CHLORIDE 0.9 % (FLUSH) 0.9 %
10 SYRINGE (ML) INJECTION ONCE
Status: CANCELLED | OUTPATIENT
Start: 2018-02-25

## 2018-02-25 RX ADMIN — SODIUM CHLORIDE 0.9 % (FLUSH) 10 ML: 0.9 % SYRINGE (ML) INJECTION at 10:55:00

## 2018-02-25 NOTE — PROGRESS NOTES
Education Record    Learner:  Patient and Spouse    Disease / Diagnosis:pancreatic ca    Barriers / Limitations:  None   Comments:    Method:  Discussion   Comments:    General Topics:  Medication, Side effects and symptom management and Plan of care revie

## 2018-02-28 ENCOUNTER — SNF/IP PROF CHARGE ONLY (OUTPATIENT)
Dept: HEMATOLOGY/ONCOLOGY | Facility: HOSPITAL | Age: 68
End: 2018-02-28

## 2018-02-28 DIAGNOSIS — C25.0 MALIGNANT NEOPLASM OF HEAD OF PANCREAS (HCC): ICD-10-CM

## 2018-02-28 PROCEDURE — G9678 ONCOLOGY CARE MODEL SERVICE: HCPCS | Performed by: INTERNAL MEDICINE

## 2018-03-15 ENCOUNTER — NURSE ONLY (OUTPATIENT)
Dept: HEMATOLOGY/ONCOLOGY | Facility: HOSPITAL | Age: 68
End: 2018-03-15
Attending: INTERNAL MEDICINE
Payer: MEDICARE

## 2018-03-15 DIAGNOSIS — C25.0 MALIGNANT NEOPLASM OF HEAD OF PANCREAS (HCC): ICD-10-CM

## 2018-03-15 LAB
ALBUMIN SERPL-MCNC: 3.4 G/DL (ref 3.5–4.8)
ALP LIVER SERPL-CCNC: 108 U/L (ref 55–142)
ALT SERPL-CCNC: 30 U/L (ref 14–54)
AST SERPL-CCNC: 22 U/L (ref 15–41)
BASOPHILS # BLD AUTO: 0.03 X10(3) UL (ref 0–0.1)
BASOPHILS NFR BLD AUTO: 0.7 %
BILIRUB SERPL-MCNC: 0.5 MG/DL (ref 0.1–2)
BUN BLD-MCNC: 22 MG/DL (ref 8–20)
CALCIUM BLD-MCNC: 8.9 MG/DL (ref 8.3–10.3)
CARBOHYDRATE AG 19-9: 56 U/ML (ref ?–37)
CARBOHYDRATE AG 19-9: 64.5 U/ML (ref 1.2–35)
CEA: 3.7 NG/ML (ref 0.5–5)
CHLORIDE: 106 MMOL/L (ref 101–111)
CO2: 25 MMOL/L (ref 22–32)
CREAT BLD-MCNC: 0.82 MG/DL (ref 0.55–1.02)
EOSINOPHIL # BLD AUTO: 0.11 X10(3) UL (ref 0–0.3)
EOSINOPHIL NFR BLD AUTO: 2.5 %
ERYTHROCYTE [DISTWIDTH] IN BLOOD BY AUTOMATED COUNT: 13.4 % (ref 11.5–16)
GLUCOSE BLD-MCNC: 106 MG/DL (ref 70–99)
HCT VFR BLD AUTO: 42.1 % (ref 34–50)
HGB BLD-MCNC: 14 G/DL (ref 12–16)
IMMATURE GRANULOCYTE COUNT: 0.01 X10(3) UL (ref 0–1)
IMMATURE GRANULOCYTE RATIO %: 0.2 %
LYMPHOCYTES # BLD AUTO: 2.01 X10(3) UL (ref 0.9–4)
LYMPHOCYTES NFR BLD AUTO: 45.9 %
M PROTEIN MFR SERPL ELPH: 6.8 G/DL (ref 6.1–8.3)
MCH RBC QN AUTO: 30.9 PG (ref 27–33.2)
MCHC RBC AUTO-ENTMCNC: 33.3 G/DL (ref 31–37)
MCV RBC AUTO: 92.9 FL (ref 81–100)
MONOCYTES # BLD AUTO: 0.3 X10(3) UL (ref 0.1–1)
MONOCYTES NFR BLD AUTO: 6.8 %
NEUTROPHIL ABS PRELIM: 1.92 X10 (3) UL (ref 1.3–6.7)
NEUTROPHILS # BLD AUTO: 1.92 X10(3) UL (ref 1.3–6.7)
NEUTROPHILS NFR BLD AUTO: 43.9 %
PLATELET # BLD AUTO: 244 10(3)UL (ref 150–450)
POTASSIUM SERPL-SCNC: 3.8 MMOL/L (ref 3.6–5.1)
RBC # BLD AUTO: 4.53 X10(6)UL (ref 3.8–5.1)
RED CELL DISTRIBUTION WIDTH-SD: 45.7 FL (ref 35.1–46.3)
SODIUM SERPL-SCNC: 139 MMOL/L (ref 136–144)
WBC # BLD AUTO: 4.4 X10(3) UL (ref 4–13)

## 2018-03-15 PROCEDURE — 85025 COMPLETE CBC W/AUTO DIFF WBC: CPT

## 2018-03-15 PROCEDURE — 82378 CARCINOEMBRYONIC ANTIGEN: CPT

## 2018-03-15 PROCEDURE — 86301 IMMUNOASSAY TUMOR CA 19-9: CPT

## 2018-03-15 PROCEDURE — 36415 COLL VENOUS BLD VENIPUNCTURE: CPT

## 2018-03-15 PROCEDURE — 80053 COMPREHEN METABOLIC PANEL: CPT

## 2018-03-16 ENCOUNTER — OFFICE VISIT (OUTPATIENT)
Dept: HEMATOLOGY/ONCOLOGY | Facility: HOSPITAL | Age: 68
End: 2018-03-16
Attending: INTERNAL MEDICINE
Payer: MEDICARE

## 2018-03-16 DIAGNOSIS — C25.0 MALIGNANT NEOPLASM OF HEAD OF PANCREAS (HCC): ICD-10-CM

## 2018-03-16 DIAGNOSIS — C25.9 MALIGNANT NEOPLASM OF PANCREAS, UNSPECIFIED LOCATION OF MALIGNANCY (HCC): Primary | ICD-10-CM

## 2018-03-16 DIAGNOSIS — C78.7 SECONDARY ADENOCARCINOMA OF LIVER (HCC): ICD-10-CM

## 2018-03-16 DIAGNOSIS — C25.0 MALIGNANT NEOPLASM OF HEAD OF PANCREAS (HCC): Primary | ICD-10-CM

## 2018-03-16 PROCEDURE — 99214 OFFICE O/P EST MOD 30 MIN: CPT | Performed by: INTERNAL MEDICINE

## 2018-03-16 PROCEDURE — 96365 THER/PROPH/DIAG IV INF INIT: CPT

## 2018-03-16 PROCEDURE — 96375 TX/PRO/DX INJ NEW DRUG ADDON: CPT

## 2018-03-16 PROCEDURE — 36593 DECLOT VASCULAR DEVICE: CPT

## 2018-03-16 RX ORDER — LORAZEPAM 2 MG/ML
INJECTION INTRAMUSCULAR EVERY 4 HOURS PRN
Status: CANCELLED | OUTPATIENT
Start: 2018-03-16

## 2018-03-16 RX ORDER — FLUOROURACIL 50 MG/ML
1500 INJECTION, SOLUTION INTRAVENOUS CONTINUOUS
Status: CANCELLED | OUTPATIENT
Start: 2018-03-16

## 2018-03-16 RX ORDER — PROCHLORPERAZINE MALEATE 10 MG
10 TABLET ORAL EVERY 6 HOURS PRN
Status: CANCELLED | OUTPATIENT
Start: 2018-03-16

## 2018-03-16 RX ORDER — FLUOROURACIL 50 MG/ML
1500 INJECTION, SOLUTION INTRAVENOUS CONTINUOUS
Status: DISCONTINUED | OUTPATIENT
Start: 2018-03-16 | End: 2018-03-16

## 2018-03-16 RX ORDER — ONDANSETRON 2 MG/ML
8 INJECTION INTRAMUSCULAR; INTRAVENOUS EVERY 6 HOURS PRN
Status: CANCELLED | OUTPATIENT
Start: 2018-03-16

## 2018-03-16 RX ORDER — METOCLOPRAMIDE HYDROCHLORIDE 5 MG/ML
10 INJECTION INTRAMUSCULAR; INTRAVENOUS EVERY 6 HOURS PRN
Status: CANCELLED | OUTPATIENT
Start: 2018-03-16

## 2018-03-16 RX ORDER — SODIUM CHLORIDE 0.9 % (FLUSH) 0.9 %
10 SYRINGE (ML) INJECTION ONCE
Status: DISCONTINUED | OUTPATIENT
Start: 2018-03-16 | End: 2018-03-16

## 2018-03-16 RX ORDER — SODIUM CHLORIDE 0.9 % (FLUSH) 0.9 %
10 SYRINGE (ML) INJECTION ONCE
Status: CANCELLED | OUTPATIENT
Start: 2018-03-16

## 2018-03-16 RX ORDER — LORAZEPAM 0.5 MG/1
TABLET ORAL EVERY 4 HOURS PRN
Status: CANCELLED | OUTPATIENT
Start: 2018-03-16

## 2018-03-16 RX ADMIN — FLUOROURACIL 2900 MG: 50 INJECTION, SOLUTION INTRAVENOUS at 12:00:00

## 2018-03-16 NOTE — PROGRESS NOTES
1020 Blood return noted and treatment begun.   Education Record    Learner:  Patient and Spouse    Disease / Diagnosis: Pancreatic Cancer    Barriers / Limitations:  None   Comments:    Method:  Brief focused and Discussion   Comments:    General Topics:  S

## 2018-03-16 NOTE — PROGRESS NOTES
Patient is here for MD f/u and cycle 22 of chemo. Patient has questions about rising CA 19-9. Feels well. Appetite and energy level is good. No complaints.

## 2018-03-18 ENCOUNTER — NURSE ONLY (OUTPATIENT)
Dept: HEMATOLOGY/ONCOLOGY | Facility: HOSPITAL | Age: 68
End: 2018-03-18
Attending: INTERNAL MEDICINE
Payer: MEDICARE

## 2018-03-18 DIAGNOSIS — C25.0 MALIGNANT NEOPLASM OF HEAD OF PANCREAS (HCC): Primary | ICD-10-CM

## 2018-03-18 PROCEDURE — 96523 IRRIG DRUG DELIVERY DEVICE: CPT

## 2018-03-18 RX ORDER — SODIUM CHLORIDE 0.9 % (FLUSH) 0.9 %
10 SYRINGE (ML) INJECTION ONCE
Status: CANCELLED | OUTPATIENT
Start: 2018-03-18

## 2018-03-18 RX ORDER — SODIUM CHLORIDE 0.9 % (FLUSH) 0.9 %
10 SYRINGE (ML) INJECTION ONCE
Status: COMPLETED | OUTPATIENT
Start: 2018-03-18 | End: 2018-03-18

## 2018-03-19 NOTE — PROGRESS NOTES
[facility]    PATIENT'S NAME: Naty Jarquin   ATTENDING PHYSICIAN: Natasha Vegas M.D.    PATIENT ACCOUNT #: [de-identified] LOCATION: [address] [place] [address] RECORD #: [id number] YOB: 1950   DATE OF SERVICE: 03/16/2018       TANESHA is 194 pounds. Her blood pressure is 130/82, pulse is 76, respiratory rate is 20, and temperature is 96.3. HEENT:  Unremarkable. She has pink conjunctivae and anicteric sclerae. Pharynx is without lesions.   LYMPHATICS:  She has no cervical, supraclavic

## 2018-03-21 ENCOUNTER — HOSPITAL ENCOUNTER (OUTPATIENT)
Dept: CT IMAGING | Facility: HOSPITAL | Age: 68
Discharge: HOME | End: 2018-03-21
Attending: INTERNAL MEDICINE
Payer: MEDICARE

## 2018-03-21 DIAGNOSIS — C25.9 MALIGNANT NEOPLASM OF PANCREAS, UNSPECIFIED LOCATION OF MALIGNANCY (HCC): ICD-10-CM

## 2018-03-21 PROCEDURE — 74177 CT ABD & PELVIS W/CONTRAST: CPT | Performed by: INTERNAL MEDICINE

## 2018-03-21 PROCEDURE — 71260 CT THORAX DX C+: CPT | Performed by: INTERNAL MEDICINE

## 2018-03-31 ENCOUNTER — SNF/IP PROF CHARGE ONLY (OUTPATIENT)
Dept: HEMATOLOGY/ONCOLOGY | Facility: HOSPITAL | Age: 68
End: 2018-03-31

## 2018-03-31 DIAGNOSIS — C25.0 MALIGNANT NEOPLASM OF HEAD OF PANCREAS (HCC): ICD-10-CM

## 2018-03-31 PROCEDURE — G9678 ONCOLOGY CARE MODEL SERVICE: HCPCS | Performed by: INTERNAL MEDICINE

## 2018-04-05 ENCOUNTER — NURSE ONLY (OUTPATIENT)
Dept: HEMATOLOGY/ONCOLOGY | Facility: HOSPITAL | Age: 68
End: 2018-04-05
Attending: INTERNAL MEDICINE
Payer: MEDICARE

## 2018-04-05 DIAGNOSIS — C25.0 MALIGNANT NEOPLASM OF HEAD OF PANCREAS (HCC): ICD-10-CM

## 2018-04-05 PROCEDURE — 80053 COMPREHEN METABOLIC PANEL: CPT

## 2018-04-05 PROCEDURE — 36415 COLL VENOUS BLD VENIPUNCTURE: CPT

## 2018-04-05 PROCEDURE — 86301 IMMUNOASSAY TUMOR CA 19-9: CPT

## 2018-04-05 PROCEDURE — 82378 CARCINOEMBRYONIC ANTIGEN: CPT

## 2018-04-05 PROCEDURE — 85025 COMPLETE CBC W/AUTO DIFF WBC: CPT

## 2018-04-06 ENCOUNTER — OFFICE VISIT (OUTPATIENT)
Dept: HEMATOLOGY/ONCOLOGY | Facility: HOSPITAL | Age: 68
End: 2018-04-06
Attending: INTERNAL MEDICINE
Payer: MEDICARE

## 2018-04-06 VITALS
SYSTOLIC BLOOD PRESSURE: 156 MMHG | OXYGEN SATURATION: 98 % | HEART RATE: 74 BPM | WEIGHT: 195.63 LBS | DIASTOLIC BLOOD PRESSURE: 81 MMHG | HEIGHT: 66.18 IN | BODY MASS INDEX: 31.44 KG/M2 | TEMPERATURE: 97 F | RESPIRATION RATE: 18 BRPM

## 2018-04-06 DIAGNOSIS — C25.0 MALIGNANT NEOPLASM OF HEAD OF PANCREAS (HCC): Primary | ICD-10-CM

## 2018-04-06 DIAGNOSIS — C78.7 SECONDARY ADENOCARCINOMA OF LIVER (HCC): ICD-10-CM

## 2018-04-06 PROCEDURE — 99214 OFFICE O/P EST MOD 30 MIN: CPT | Performed by: INTERNAL MEDICINE

## 2018-04-06 PROCEDURE — 96365 THER/PROPH/DIAG IV INF INIT: CPT

## 2018-04-06 PROCEDURE — 96374 THER/PROPH/DIAG INJ IV PUSH: CPT

## 2018-04-06 RX ORDER — SODIUM CHLORIDE 0.9 % (FLUSH) 0.9 %
10 SYRINGE (ML) INJECTION ONCE
Status: COMPLETED | OUTPATIENT
Start: 2018-04-08 | End: 2018-04-08

## 2018-04-06 RX ORDER — LORAZEPAM 0.5 MG/1
TABLET ORAL EVERY 4 HOURS PRN
Status: CANCELLED | OUTPATIENT
Start: 2018-04-06

## 2018-04-06 RX ORDER — SODIUM CHLORIDE 0.9 % (FLUSH) 0.9 %
10 SYRINGE (ML) INJECTION ONCE
Status: CANCELLED | OUTPATIENT
Start: 2018-04-06

## 2018-04-06 RX ORDER — FLUOROURACIL 50 MG/ML
1500 INJECTION, SOLUTION INTRAVENOUS CONTINUOUS
Status: DISCONTINUED | OUTPATIENT
Start: 2018-04-06 | End: 2018-04-06

## 2018-04-06 RX ORDER — FLUOROURACIL 50 MG/ML
1500 INJECTION, SOLUTION INTRAVENOUS CONTINUOUS
Status: CANCELLED | OUTPATIENT
Start: 2018-04-06

## 2018-04-06 RX ORDER — ONDANSETRON 2 MG/ML
8 INJECTION INTRAMUSCULAR; INTRAVENOUS EVERY 6 HOURS PRN
Status: CANCELLED | OUTPATIENT
Start: 2018-04-06

## 2018-04-06 RX ORDER — LORAZEPAM 2 MG/ML
INJECTION INTRAMUSCULAR EVERY 4 HOURS PRN
Status: CANCELLED | OUTPATIENT
Start: 2018-04-06

## 2018-04-06 RX ORDER — PROCHLORPERAZINE MALEATE 10 MG
10 TABLET ORAL EVERY 6 HOURS PRN
Status: CANCELLED | OUTPATIENT
Start: 2018-04-06

## 2018-04-06 RX ORDER — METOCLOPRAMIDE HYDROCHLORIDE 5 MG/ML
10 INJECTION INTRAMUSCULAR; INTRAVENOUS EVERY 6 HOURS PRN
Status: CANCELLED | OUTPATIENT
Start: 2018-04-06

## 2018-04-06 RX ADMIN — FLUOROURACIL 2900 MG: 50 INJECTION, SOLUTION INTRAVENOUS at 11:34:00

## 2018-04-06 NOTE — PROGRESS NOTES
Education Record    Learner:  Patient and Spouse    Disease / Diagnosis: Pancreatic Caner    Barriers / Limitations:  None   Comments:    Method:  Brief focused   Comments:    General Topics:  Side effects and symptom management and Plan of care reviewed

## 2018-04-08 ENCOUNTER — NURSE ONLY (OUTPATIENT)
Dept: HEMATOLOGY/ONCOLOGY | Facility: HOSPITAL | Age: 68
End: 2018-04-08
Attending: INTERNAL MEDICINE
Payer: MEDICARE

## 2018-04-08 DIAGNOSIS — C25.0 MALIGNANT NEOPLASM OF HEAD OF PANCREAS (HCC): Primary | ICD-10-CM

## 2018-04-08 PROCEDURE — 96523 IRRIG DRUG DELIVERY DEVICE: CPT

## 2018-04-08 RX ADMIN — SODIUM CHLORIDE 0.9 % (FLUSH) 10 ML: 0.9 % SYRINGE (ML) INJECTION at 09:30:00

## 2018-04-10 NOTE — PROGRESS NOTES
[facility]    PATIENT'S NAME: Jessica Davis   ATTENDING PHYSICIAN: Yasir Pickens M.D.    PATIENT ACCOUNT #: [de-identified] LOCATION: [address] [place] [address] RECORD #: [id number] YOB: 1950   DATE OF SERVICE: 04/06/2018       TANESHA daily; ondansetron 8 mg q.8 h. p.r.n.; prochlorperazine 10 mg q.6 h. p.r.n. PHYSICAL EXAMINATION:    GENERAL:  She is a well-developed, well-nourished female in no acute distress. VITAL SIGNS:  Her performance status is 0. Her weight is 195 pounds.

## 2018-04-26 ENCOUNTER — NURSE ONLY (OUTPATIENT)
Dept: HEMATOLOGY/ONCOLOGY | Facility: HOSPITAL | Age: 68
End: 2018-04-26
Attending: INTERNAL MEDICINE
Payer: MEDICARE

## 2018-04-26 DIAGNOSIS — C25.0 MALIGNANT NEOPLASM OF HEAD OF PANCREAS (HCC): ICD-10-CM

## 2018-04-26 DIAGNOSIS — C78.7 SECONDARY ADENOCARCINOMA OF LIVER (HCC): ICD-10-CM

## 2018-04-26 PROCEDURE — 36415 COLL VENOUS BLD VENIPUNCTURE: CPT

## 2018-04-26 PROCEDURE — 80053 COMPREHEN METABOLIC PANEL: CPT

## 2018-04-26 PROCEDURE — 86301 IMMUNOASSAY TUMOR CA 19-9: CPT

## 2018-04-26 PROCEDURE — 85025 COMPLETE CBC W/AUTO DIFF WBC: CPT

## 2018-04-27 ENCOUNTER — OFFICE VISIT (OUTPATIENT)
Dept: HEMATOLOGY/ONCOLOGY | Facility: HOSPITAL | Age: 68
End: 2018-04-27
Attending: INTERNAL MEDICINE
Payer: MEDICARE

## 2018-04-27 VITALS
BODY MASS INDEX: 31.56 KG/M2 | RESPIRATION RATE: 18 BRPM | DIASTOLIC BLOOD PRESSURE: 81 MMHG | SYSTOLIC BLOOD PRESSURE: 159 MMHG | TEMPERATURE: 97 F | OXYGEN SATURATION: 95 % | HEIGHT: 66.18 IN | HEART RATE: 60 BPM | WEIGHT: 196.38 LBS

## 2018-04-27 DIAGNOSIS — C78.7 SECONDARY ADENOCARCINOMA OF LIVER (HCC): ICD-10-CM

## 2018-04-27 DIAGNOSIS — C25.9 MALIGNANT NEOPLASM OF PANCREAS, UNSPECIFIED LOCATION OF MALIGNANCY (HCC): Primary | ICD-10-CM

## 2018-04-27 DIAGNOSIS — C25.0 MALIGNANT NEOPLASM OF HEAD OF PANCREAS (HCC): Primary | ICD-10-CM

## 2018-04-27 PROCEDURE — 96374 THER/PROPH/DIAG INJ IV PUSH: CPT

## 2018-04-27 PROCEDURE — 96375 TX/PRO/DX INJ NEW DRUG ADDON: CPT

## 2018-04-27 PROCEDURE — 99214 OFFICE O/P EST MOD 30 MIN: CPT | Performed by: INTERNAL MEDICINE

## 2018-04-27 PROCEDURE — 96365 THER/PROPH/DIAG IV INF INIT: CPT

## 2018-04-27 RX ORDER — ONDANSETRON 2 MG/ML
8 INJECTION INTRAMUSCULAR; INTRAVENOUS EVERY 6 HOURS PRN
Status: CANCELLED | OUTPATIENT
Start: 2018-04-27

## 2018-04-27 RX ORDER — LORAZEPAM 0.5 MG/1
TABLET ORAL EVERY 4 HOURS PRN
Status: CANCELLED | OUTPATIENT
Start: 2018-04-27

## 2018-04-27 RX ORDER — LORAZEPAM 2 MG/ML
INJECTION INTRAMUSCULAR EVERY 4 HOURS PRN
Status: CANCELLED | OUTPATIENT
Start: 2018-04-27

## 2018-04-27 RX ORDER — FLUOROURACIL 50 MG/ML
1500 INJECTION, SOLUTION INTRAVENOUS CONTINUOUS
Status: DISCONTINUED | OUTPATIENT
Start: 2018-04-27 | End: 2018-04-27

## 2018-04-27 RX ORDER — METOCLOPRAMIDE HYDROCHLORIDE 5 MG/ML
10 INJECTION INTRAMUSCULAR; INTRAVENOUS EVERY 6 HOURS PRN
Status: CANCELLED | OUTPATIENT
Start: 2018-04-27

## 2018-04-27 RX ORDER — PROCHLORPERAZINE MALEATE 10 MG
10 TABLET ORAL EVERY 6 HOURS PRN
Status: CANCELLED | OUTPATIENT
Start: 2018-04-27

## 2018-04-27 RX ORDER — FLUOROURACIL 50 MG/ML
1500 INJECTION, SOLUTION INTRAVENOUS CONTINUOUS
Status: CANCELLED | OUTPATIENT
Start: 2018-04-27

## 2018-04-27 RX ADMIN — FLUOROURACIL 2900 MG: 50 INJECTION, SOLUTION INTRAVENOUS at 11:40:00

## 2018-04-27 NOTE — PROGRESS NOTES
Education Record    Learner:  Patient and Family Member    Disease / Diagnosis: Pancreatic CA - Leucovorin and 5FU CADD infusion    Barriers / Limitations:  None   Comments:    Method:  Brief focused   Comments:    General Topics:  Plan of care reviewed

## 2018-04-27 NOTE — PROGRESS NOTES
Patient is here for cycle 24 of chemo. Feeling well. Denies pain. Appetite and energy level is good. No complaints.

## 2018-04-28 NOTE — PROGRESS NOTES
[facility]    PATIENT'S NAME: Tita Christian   ATTENDING PHYSICIAN: Flor Brantley M.D.    PATIENT ACCOUNT #: [de-identified] LOCATION: [address] [place] [address] RECORD #: [id number] YOB: 1950   DATE OF SERVICE: 04/27/2018       TANESHA HEENT:  Unremarkable. She has pink conjunctivae, anicteric sclerae. Pharynx without lesions. LYMPHATICS:  She has no cervical, supraclavicular, or axillary adenopathy.   LUNGS:  Resonant to percussion and clear to auscultation, with no wheezing, rales,

## 2018-04-29 ENCOUNTER — NURSE ONLY (OUTPATIENT)
Dept: HEMATOLOGY/ONCOLOGY | Facility: HOSPITAL | Age: 68
End: 2018-04-29
Attending: INTERNAL MEDICINE
Payer: MEDICARE

## 2018-04-29 DIAGNOSIS — C25.0 MALIGNANT NEOPLASM OF HEAD OF PANCREAS (HCC): Primary | ICD-10-CM

## 2018-04-29 PROCEDURE — 96523 IRRIG DRUG DELIVERY DEVICE: CPT

## 2018-04-29 RX ORDER — SODIUM CHLORIDE 0.9 % (FLUSH) 0.9 %
10 SYRINGE (ML) INJECTION ONCE
Status: CANCELLED | OUTPATIENT
Start: 2018-04-29

## 2018-04-29 RX ORDER — SODIUM CHLORIDE 0.9 % (FLUSH) 0.9 %
10 SYRINGE (ML) INJECTION ONCE
Status: COMPLETED | OUTPATIENT
Start: 2018-04-29 | End: 2018-04-29

## 2018-04-29 RX ADMIN — SODIUM CHLORIDE 0.9 % (FLUSH) 10 ML: 0.9 % SYRINGE (ML) INJECTION at 10:40:00

## 2018-04-30 ENCOUNTER — SNF/IP PROF CHARGE ONLY (OUTPATIENT)
Dept: HEMATOLOGY/ONCOLOGY | Facility: HOSPITAL | Age: 68
End: 2018-04-30

## 2018-04-30 ENCOUNTER — APPOINTMENT (OUTPATIENT)
Dept: HEMATOLOGY/ONCOLOGY | Facility: HOSPITAL | Age: 68
End: 2018-04-30
Attending: INTERNAL MEDICINE
Payer: MEDICARE

## 2018-04-30 DIAGNOSIS — C25.0 MALIGNANT NEOPLASM OF HEAD OF PANCREAS (HCC): ICD-10-CM

## 2018-04-30 PROCEDURE — G9678 ONCOLOGY CARE MODEL SERVICE: HCPCS | Performed by: INTERNAL MEDICINE

## 2018-05-14 ENCOUNTER — OFFICE VISIT (OUTPATIENT)
Dept: HEMATOLOGY/ONCOLOGY | Facility: HOSPITAL | Age: 68
End: 2018-05-14
Attending: INTERNAL MEDICINE
Payer: MEDICARE

## 2018-05-14 VITALS
TEMPERATURE: 97 F | SYSTOLIC BLOOD PRESSURE: 156 MMHG | BODY MASS INDEX: 31.69 KG/M2 | HEIGHT: 66.18 IN | OXYGEN SATURATION: 98 % | WEIGHT: 197.19 LBS | HEART RATE: 72 BPM | DIASTOLIC BLOOD PRESSURE: 81 MMHG | RESPIRATION RATE: 18 BRPM

## 2018-05-14 DIAGNOSIS — C25.0 MALIGNANT NEOPLASM OF HEAD OF PANCREAS (HCC): Primary | ICD-10-CM

## 2018-05-14 DIAGNOSIS — C78.7 SECONDARY ADENOCARCINOMA OF LIVER (HCC): ICD-10-CM

## 2018-05-14 PROCEDURE — 96374 THER/PROPH/DIAG INJ IV PUSH: CPT

## 2018-05-14 PROCEDURE — 85025 COMPLETE CBC W/AUTO DIFF WBC: CPT

## 2018-05-14 PROCEDURE — 86301 IMMUNOASSAY TUMOR CA 19-9: CPT

## 2018-05-14 PROCEDURE — 99214 OFFICE O/P EST MOD 30 MIN: CPT | Performed by: CLINICAL NURSE SPECIALIST

## 2018-05-14 PROCEDURE — 96375 TX/PRO/DX INJ NEW DRUG ADDON: CPT

## 2018-05-14 PROCEDURE — 96365 THER/PROPH/DIAG IV INF INIT: CPT

## 2018-05-14 PROCEDURE — 82378 CARCINOEMBRYONIC ANTIGEN: CPT

## 2018-05-14 PROCEDURE — 80053 COMPREHEN METABOLIC PANEL: CPT

## 2018-05-14 RX ORDER — LORAZEPAM 0.5 MG/1
TABLET ORAL EVERY 4 HOURS PRN
Status: CANCELLED | OUTPATIENT
Start: 2018-05-18

## 2018-05-14 RX ORDER — FLUOROURACIL 50 MG/ML
1500 INJECTION, SOLUTION INTRAVENOUS CONTINUOUS
Status: CANCELLED | OUTPATIENT
Start: 2018-05-18

## 2018-05-14 RX ORDER — ONDANSETRON 2 MG/ML
8 INJECTION INTRAMUSCULAR; INTRAVENOUS EVERY 6 HOURS PRN
Status: CANCELLED | OUTPATIENT
Start: 2018-05-18

## 2018-05-14 RX ORDER — FLUOROURACIL 50 MG/ML
1500 INJECTION, SOLUTION INTRAVENOUS CONTINUOUS
Status: DISCONTINUED | OUTPATIENT
Start: 2018-05-14 | End: 2018-05-14

## 2018-05-14 RX ORDER — METOCLOPRAMIDE HYDROCHLORIDE 5 MG/ML
10 INJECTION INTRAMUSCULAR; INTRAVENOUS EVERY 6 HOURS PRN
Status: CANCELLED | OUTPATIENT
Start: 2018-05-18

## 2018-05-14 RX ORDER — PROCHLORPERAZINE MALEATE 10 MG
10 TABLET ORAL EVERY 6 HOURS PRN
Status: CANCELLED | OUTPATIENT
Start: 2018-05-18

## 2018-05-14 RX ORDER — LORAZEPAM 2 MG/ML
INJECTION INTRAMUSCULAR EVERY 4 HOURS PRN
Status: CANCELLED | OUTPATIENT
Start: 2018-05-18

## 2018-05-14 RX ADMIN — FLUOROURACIL 2900 MG: 50 INJECTION, SOLUTION INTRAVENOUS at 11:08:00

## 2018-05-14 NOTE — PROGRESS NOTES
Labs are good, proceeding with treatment. Patient to have CT scan coming up and based on scan they will decide about next treatment, patient aware of the plan. Patient to return here on Wednesday for pump d/c.

## 2018-05-14 NOTE — PROGRESS NOTES
ANP Visit Note    Patient Name: Miguelangel Gu   YOB: 1950   Medical Record Number: [id number]   CSN: [id number]   Date of visit: 5/14/2018       Chief Complaint/Reason for Visit:  Metastatic Pancreatic Cancer, Chemotherapy     History Used    Alcohol use No    Drug use: No    Sexual activity: Not on file     Other Topics Concern   None on file     Social History Narrative   None on file       Medications:    Current Outpatient Prescriptions:   •  amoxicillin 500 MG Oral Tab, 4 tabs one intact. Lymphatics: There is no palpable lymphadenopathy throughout in the cervical,supraclavicular, axillary, or inguinal regions. Psych/Depression: mood and affect are appropriate.      Labs:    Recent Results (from the past 24 hour(s))  -COMP METABOLI use Premarin cream.     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 Will discuss timing of treatments with Dr Swapna Lawrence.        Emotional Well Being:  I have assessed the patient's emotional well-being and any concerns about anxiety

## 2018-05-14 NOTE — PROGRESS NOTES
Patient here for D1C25 Leucovorin and 5FU. CBC looks good, waiting on CMP. Patient met with Juani ELDER today. Next appts scheduled.      Education Record    Learner:  Patient and Spouse    Disease / Diagnosis: pancreas and liver    Barriers / Limitations:  Non

## 2018-05-14 NOTE — PROGRESS NOTES
Patient presents with:  Chemotherapy: APN assessment prior to treatment    Pt is here for treatment today. She has no complaints. Denies N,V,D,C, and pain.      Education Record    Learner:  Patient and Spouse    Disease / Diagnosis: pancreatic cancer    Ba

## 2018-05-16 ENCOUNTER — NURSE ONLY (OUTPATIENT)
Dept: HEMATOLOGY/ONCOLOGY | Facility: HOSPITAL | Age: 68
End: 2018-05-16
Attending: INTERNAL MEDICINE
Payer: MEDICARE

## 2018-05-16 DIAGNOSIS — C25.0 MALIGNANT NEOPLASM OF HEAD OF PANCREAS (HCC): Primary | ICD-10-CM

## 2018-05-16 PROCEDURE — 96523 IRRIG DRUG DELIVERY DEVICE: CPT

## 2018-05-16 RX ORDER — SODIUM CHLORIDE 0.9 % (FLUSH) 0.9 %
10 SYRINGE (ML) INJECTION ONCE
Status: COMPLETED | OUTPATIENT
Start: 2018-05-16 | End: 2018-05-16

## 2018-05-16 RX ORDER — SODIUM CHLORIDE 0.9 % (FLUSH) 0.9 %
10 SYRINGE (ML) INJECTION ONCE
Status: CANCELLED | OUTPATIENT
Start: 2018-05-16

## 2018-05-16 RX ADMIN — SODIUM CHLORIDE 0.9 % (FLUSH) 10 ML: 0.9 % SYRINGE (ML) INJECTION at 09:35:00

## 2018-05-24 ENCOUNTER — HOSPITAL ENCOUNTER (OUTPATIENT)
Dept: CT IMAGING | Facility: HOSPITAL | Age: 68
Discharge: HOME | End: 2018-05-24
Attending: INTERNAL MEDICINE
Payer: MEDICARE

## 2018-05-24 DIAGNOSIS — C25.9 MALIGNANT NEOPLASM OF PANCREAS, UNSPECIFIED LOCATION OF MALIGNANCY (HCC): ICD-10-CM

## 2018-05-24 PROCEDURE — 71260 CT THORAX DX C+: CPT | Performed by: INTERNAL MEDICINE

## 2018-05-24 PROCEDURE — 74177 CT ABD & PELVIS W/CONTRAST: CPT | Performed by: INTERNAL MEDICINE

## 2018-05-24 PROCEDURE — 82565 ASSAY OF CREATININE: CPT

## 2018-05-25 ENCOUNTER — TELEPHONE (OUTPATIENT)
Dept: HEMATOLOGY/ONCOLOGY | Facility: HOSPITAL | Age: 68
End: 2018-05-25

## 2018-05-29 ENCOUNTER — TELEPHONE (OUTPATIENT)
Dept: HEMATOLOGY/ONCOLOGY | Facility: HOSPITAL | Age: 68
End: 2018-05-29

## 2018-05-29 DIAGNOSIS — C78.7 SECONDARY ADENOCARCINOMA OF LIVER (HCC): ICD-10-CM

## 2018-05-29 DIAGNOSIS — C25.0 MALIGNANT NEOPLASM OF HEAD OF PANCREAS (HCC): Primary | ICD-10-CM

## 2018-05-29 NOTE — TELEPHONE ENCOUNTER
Patient had a Ct scan last week. Patient is asking what is the next step and when does she need to follow up with Dr Deep Henry? Message forwarded to Dr Deep Henry.

## 2018-05-29 NOTE — PROGRESS NOTES
Spoke to patient regarding labs and scan. Likely progressing, though volume of disease is small enough that we see no clear signs. Millimeter sized changes in lung nodules. She would like to leave town tomorrow and return sometime after the 6th.   (would

## 2018-05-31 ENCOUNTER — SNF/IP PROF CHARGE ONLY (OUTPATIENT)
Dept: HEMATOLOGY/ONCOLOGY | Facility: HOSPITAL | Age: 68
End: 2018-05-31

## 2018-05-31 DIAGNOSIS — C25.0 MALIGNANT NEOPLASM OF HEAD OF PANCREAS (HCC): ICD-10-CM

## 2018-05-31 PROCEDURE — G9678 ONCOLOGY CARE MODEL SERVICE: HCPCS | Performed by: INTERNAL MEDICINE

## 2018-06-14 ENCOUNTER — NURSE ONLY (OUTPATIENT)
Dept: HEMATOLOGY/ONCOLOGY | Facility: HOSPITAL | Age: 68
End: 2018-06-14
Attending: INTERNAL MEDICINE
Payer: MEDICARE

## 2018-06-14 DIAGNOSIS — C78.7 SECONDARY ADENOCARCINOMA OF LIVER (HCC): ICD-10-CM

## 2018-06-14 DIAGNOSIS — C25.0 MALIGNANT NEOPLASM OF HEAD OF PANCREAS (HCC): ICD-10-CM

## 2018-06-14 PROCEDURE — 85025 COMPLETE CBC W/AUTO DIFF WBC: CPT

## 2018-06-14 PROCEDURE — 36415 COLL VENOUS BLD VENIPUNCTURE: CPT

## 2018-06-14 PROCEDURE — 86301 IMMUNOASSAY TUMOR CA 19-9: CPT

## 2018-06-14 PROCEDURE — 82378 CARCINOEMBRYONIC ANTIGEN: CPT

## 2018-06-14 PROCEDURE — 80053 COMPREHEN METABOLIC PANEL: CPT

## 2018-06-19 ENCOUNTER — OFFICE VISIT (OUTPATIENT)
Dept: HEMATOLOGY/ONCOLOGY | Facility: HOSPITAL | Age: 68
End: 2018-06-19
Attending: INTERNAL MEDICINE
Payer: MEDICARE

## 2018-06-19 VITALS
BODY MASS INDEX: 30.67 KG/M2 | RESPIRATION RATE: 18 BRPM | TEMPERATURE: 96 F | HEART RATE: 78 BPM | DIASTOLIC BLOOD PRESSURE: 85 MMHG | WEIGHT: 190.81 LBS | SYSTOLIC BLOOD PRESSURE: 132 MMHG | OXYGEN SATURATION: 97 % | HEIGHT: 66.18 IN

## 2018-06-19 DIAGNOSIS — C25.0 MALIGNANT NEOPLASM OF HEAD OF PANCREAS (HCC): Primary | ICD-10-CM

## 2018-06-19 DIAGNOSIS — C78.7 SECONDARY ADENOCARCINOMA OF LIVER (HCC): ICD-10-CM

## 2018-06-19 DIAGNOSIS — R91.8 PULMONARY NODULES: ICD-10-CM

## 2018-06-19 PROCEDURE — 96374 THER/PROPH/DIAG INJ IV PUSH: CPT

## 2018-06-19 PROCEDURE — 96415 CHEMO IV INFUSION ADDL HR: CPT

## 2018-06-19 PROCEDURE — 86301 IMMUNOASSAY TUMOR CA 19-9: CPT

## 2018-06-19 PROCEDURE — 96413 CHEMO IV INFUSION 1 HR: CPT

## 2018-06-19 PROCEDURE — 82378 CARCINOEMBRYONIC ANTIGEN: CPT

## 2018-06-19 PROCEDURE — 99214 OFFICE O/P EST MOD 30 MIN: CPT | Performed by: INTERNAL MEDICINE

## 2018-06-19 PROCEDURE — 96368 THER/DIAG CONCURRENT INF: CPT

## 2018-06-19 PROCEDURE — 96375 TX/PRO/DX INJ NEW DRUG ADDON: CPT

## 2018-06-19 RX ORDER — ONDANSETRON 2 MG/ML
8 INJECTION INTRAMUSCULAR; INTRAVENOUS EVERY 6 HOURS PRN
Status: CANCELLED | OUTPATIENT
Start: 2018-06-19

## 2018-06-19 RX ORDER — FLUOROURACIL 50 MG/ML
1500 INJECTION, SOLUTION INTRAVENOUS CONTINUOUS
Status: DISCONTINUED | OUTPATIENT
Start: 2018-06-19 | End: 2018-06-19

## 2018-06-19 RX ORDER — PROCHLORPERAZINE MALEATE 10 MG
10 TABLET ORAL EVERY 6 HOURS PRN
Status: CANCELLED | OUTPATIENT
Start: 2018-06-19

## 2018-06-19 RX ORDER — MELATONIN
1000 DAILY
COMMUNITY
End: 2019-01-01

## 2018-06-19 RX ORDER — LORAZEPAM 2 MG/ML
INJECTION INTRAMUSCULAR EVERY 4 HOURS PRN
Status: CANCELLED | OUTPATIENT
Start: 2018-06-19

## 2018-06-19 RX ORDER — FLUOROURACIL 50 MG/ML
1500 INJECTION, SOLUTION INTRAVENOUS CONTINUOUS
Status: CANCELLED | OUTPATIENT
Start: 2018-06-19

## 2018-06-19 RX ORDER — MAGNESIUM 200 MG
TABLET ORAL DAILY
COMMUNITY
End: 2019-01-01

## 2018-06-19 RX ORDER — METOCLOPRAMIDE HYDROCHLORIDE 5 MG/ML
10 INJECTION INTRAMUSCULAR; INTRAVENOUS EVERY 6 HOURS PRN
Status: CANCELLED | OUTPATIENT
Start: 2018-06-19

## 2018-06-19 RX ORDER — LORAZEPAM 0.5 MG/1
TABLET ORAL EVERY 4 HOURS PRN
Status: CANCELLED | OUTPATIENT
Start: 2018-06-19

## 2018-06-19 RX ADMIN — FLUOROURACIL 2900 MG: 50 INJECTION, SOLUTION INTRAVENOUS at 13:34:00

## 2018-06-19 NOTE — PROGRESS NOTES
Pt here for C26D1.   Arrives Amb[place] independently, accompanied by Spouse           Modifications in dose or schedule: No     Frequency of blood return and site check throughout administration: Prior to administration and At completion of therapy   Disch

## 2018-06-19 NOTE — PROGRESS NOTES
Patient is here for MD f/u pancreatic cancer. Patient had a ct scan on May 24. Labs drawn on 6/14. Patient is here to discuss these results and to find out the next step. Feeling well. Appetite and energy level is good. No complaints.          Education Rec

## 2018-06-20 PROBLEM — R91.8 PULMONARY NODULES: Status: ACTIVE | Noted: 2018-06-20

## 2018-06-21 ENCOUNTER — OFFICE VISIT (OUTPATIENT)
Dept: HEMATOLOGY/ONCOLOGY | Facility: HOSPITAL | Age: 68
End: 2018-06-21
Attending: INTERNAL MEDICINE
Payer: MEDICARE

## 2018-06-21 DIAGNOSIS — C25.0 MALIGNANT NEOPLASM OF HEAD OF PANCREAS (HCC): Primary | ICD-10-CM

## 2018-06-21 PROCEDURE — 96372 THER/PROPH/DIAG INJ SC/IM: CPT

## 2018-06-21 RX ORDER — SODIUM CHLORIDE 0.9 % (FLUSH) 0.9 %
10 SYRINGE (ML) INJECTION ONCE
Status: COMPLETED | OUTPATIENT
Start: 2018-06-21 | End: 2018-06-21

## 2018-06-21 RX ORDER — SODIUM CHLORIDE 0.9 % (FLUSH) 0.9 %
10 SYRINGE (ML) INJECTION ONCE
Status: CANCELLED | OUTPATIENT
Start: 2018-06-21

## 2018-06-21 RX ADMIN — SODIUM CHLORIDE 0.9 % (FLUSH) 10 ML: 0.9 % SYRINGE (ML) INJECTION at 12:10:00

## 2018-06-21 NOTE — PROGRESS NOTES
[facility]    PATIENT'S NAME: Jessica Davis   ATTENDING PHYSICIAN: Yasir Pickens M.D.    PATIENT ACCOUNT #: [de-identified] LOCATION: [address] [place] [address] RECORD #: [id number] YOB: 1950   DATE OF SERVICE: 06/19/2018       TANESHA discuss further management. Again, her weight is stable. Her appetite is fine. She feels extremely well. She has some anxiety about where to go with her treatment, but other than that, has no physical complaints.   Her current medications include amoxic nab-paclitaxel in the future. The latter 2 are likely to result in alopecia. She would prefer not to have this.   She also would like to try to go away in late July or early August, and I told her that if we could get 1 cycle or 2 in now at 2-week interva

## 2018-06-21 NOTE — PROGRESS NOTES
Education Record    Learner:  Patient    Disease / Diagnosis: Pancreatic Cancer    Barriers / Limitations:  None   Comments:    Method:  Brief focused and Discussion   Comments:    General Topics:  Medication and Side effects and symptom management   Comme

## 2018-06-22 ENCOUNTER — OFFICE VISIT (OUTPATIENT)
Dept: HEMATOLOGY/ONCOLOGY | Facility: HOSPITAL | Age: 68
End: 2018-06-22
Attending: INTERNAL MEDICINE
Payer: MEDICARE

## 2018-06-22 ENCOUNTER — TELEPHONE (OUTPATIENT)
Dept: HEMATOLOGY/ONCOLOGY | Facility: HOSPITAL | Age: 68
End: 2018-06-22

## 2018-06-22 VITALS
TEMPERATURE: 97 F | DIASTOLIC BLOOD PRESSURE: 86 MMHG | HEART RATE: 86 BPM | SYSTOLIC BLOOD PRESSURE: 149 MMHG | HEIGHT: 66.18 IN | WEIGHT: 192.63 LBS | RESPIRATION RATE: 18 BRPM | BODY MASS INDEX: 30.96 KG/M2 | OXYGEN SATURATION: 97 %

## 2018-06-22 DIAGNOSIS — C25.0 MALIGNANT NEOPLASM OF HEAD OF PANCREAS (HCC): ICD-10-CM

## 2018-06-22 DIAGNOSIS — C78.7 SECONDARY ADENOCARCINOMA OF LIVER (HCC): ICD-10-CM

## 2018-06-22 DIAGNOSIS — C25.0 MALIGNANT NEOPLASM OF HEAD OF PANCREAS (HCC): Primary | ICD-10-CM

## 2018-06-22 DIAGNOSIS — T45.1X5A CHEMOTHERAPY-INDUCED NAUSEA: ICD-10-CM

## 2018-06-22 DIAGNOSIS — R11.15 INTRACTABLE CYCLICAL VOMITING WITH NAUSEA: ICD-10-CM

## 2018-06-22 DIAGNOSIS — G44.40 HEADACHE, DRUG INDUCED: ICD-10-CM

## 2018-06-22 DIAGNOSIS — R11.0 CHEMOTHERAPY-INDUCED NAUSEA: ICD-10-CM

## 2018-06-22 DIAGNOSIS — E87.1 HYPONATREMIA: ICD-10-CM

## 2018-06-22 PROBLEM — R11.2 INTRACTABLE NAUSEA AND VOMITING: Status: ACTIVE | Noted: 2018-06-22

## 2018-06-22 PROCEDURE — 96361 HYDRATE IV INFUSION ADD-ON: CPT

## 2018-06-22 PROCEDURE — 80053 COMPREHEN METABOLIC PANEL: CPT

## 2018-06-22 PROCEDURE — 85027 COMPLETE CBC AUTOMATED: CPT

## 2018-06-22 PROCEDURE — 96374 THER/PROPH/DIAG INJ IV PUSH: CPT

## 2018-06-22 PROCEDURE — 85025 COMPLETE CBC W/AUTO DIFF WBC: CPT

## 2018-06-22 PROCEDURE — 99215 OFFICE O/P EST HI 40 MIN: CPT | Performed by: CLINICAL NURSE SPECIALIST

## 2018-06-22 PROCEDURE — 85007 BL SMEAR W/DIFF WBC COUNT: CPT

## 2018-06-22 RX ORDER — SODIUM CHLORIDE 0.9 % (FLUSH) 0.9 %
10 SYRINGE (ML) INJECTION ONCE
Status: COMPLETED | OUTPATIENT
Start: 2018-06-22 | End: 2018-06-22

## 2018-06-22 RX ORDER — SODIUM CHLORIDE 0.9 % (FLUSH) 0.9 %
10 SYRINGE (ML) INJECTION ONCE
Status: CANCELLED | OUTPATIENT
Start: 2018-06-22

## 2018-06-22 RX ADMIN — SODIUM CHLORIDE 0.9 % (FLUSH) 10 ML: 0.9 % SYRINGE (ML) INJECTION at 10:00:00

## 2018-06-22 NOTE — PROGRESS NOTES
Education Record    Learner:  Patient and Spouse    Disease / Diagnosis: Nausea / Dehydration    Barriers / Limitations:  None   Comments:    Method:  Discussion   Comments:    General Topics:  Medication, Precautions, Procedure and Plan of care reviewed

## 2018-06-22 NOTE — PROGRESS NOTES
ANP Visit Note    Patient Name: Nhi Alba   YOB: 1950   Medical Record Number: [id number]   CSN: [id number]   Date of visit: 6/22/2018       Chief Complaint/Reason for Visit:  Patient presents with:  Nausea: APN assessment; pt ann name: N/A    Years of education: N/A  Number of children: N/A     Occupational History  None on file     Social History Main Topics   Smoking status: Never Smoker    Smokeless tobacco: Never Used    Alcohol use No    Drug use: No    Sexual activity: Not on 6. 18\")   Wt 87.4 kg (192 lb 9.6 oz)   SpO2 97%   BMI 30.92 kg/m²   HEENT: EOMs intact. PERRL. Oropharynx is clear. Dry   Neck: No JVD. No palpable lymphadenopathy. Neck is supple. Chest: Clear to auscultation. Heart: Regular rate and rhythm.    Abdomen: 0.30 x10(3) uL   Basophil Absolute Manual 0.29 (H) 0.00 - 0.10 x10(3) uL   Neutrophils % Manual 81 %   Band % 16 %   Lymphocyte % Manual 1 %   Monocyte % Manual 1 %   Eosinophil % Manual 0 %   Basophil % Manual 1 %   Total Cells Counted 100    RBC Morpholo

## 2018-06-22 NOTE — TELEPHONE ENCOUNTER
Patient called stating she had chemo on Tuesday and has had no appetite, very nauseated (zofran/compazine are not helping), and horrible headache (tylenol is helping), only cold sensitivity if she drinks something cold, no neuropathy.  Patient has been off

## 2018-06-22 NOTE — PROGRESS NOTES
Patient presents with:  Nausea: APN assessment; pt treated Tue, with severe nausea since tx    Pt is here for a sick call. Pt was treated on Tuesday and has been nauseated with headache since then.  She reports that medication for nausea is not helpful, she

## 2018-06-25 ENCOUNTER — TELEPHONE (OUTPATIENT)
Dept: HEMATOLOGY/ONCOLOGY | Facility: HOSPITAL | Age: 68
End: 2018-06-25

## 2018-06-25 DIAGNOSIS — K21.9 GASTROESOPHAGEAL REFLUX DISEASE WITHOUT ESOPHAGITIS: Primary | ICD-10-CM

## 2018-06-25 RX ORDER — PANTOPRAZOLE SODIUM 40 MG/1
40 TABLET, DELAYED RELEASE ORAL DAILY
Qty: 30 TABLET | Refills: 0 | Status: SHIPPED | OUTPATIENT
Start: 2018-06-25 | End: 2019-01-01

## 2018-06-25 NOTE — TELEPHONE ENCOUNTER
Patient asking to speak with Ann-Marie Oconnor. She was in Friday for APN assessment and IVF. Patient states some improvement today but still \"not right\". Nausea and dry heaves Saturday. \"non-productive\" belching. Eating small amounts.   Wants to discuss with

## 2018-06-25 NOTE — TELEPHONE ENCOUNTER
Patient calling, still not feeling right, she has a lot of belching and her stomach still does not feel right. Protonix ordered, to take Zantac at bedtime for 3 nights. Her eyes are watering and she will try [place] OTC eye drops.  She is asking if she can

## 2018-06-26 ENCOUNTER — SOCIAL WORK SERVICES (OUTPATIENT)
Dept: HEMATOLOGY/ONCOLOGY | Facility: HOSPITAL | Age: 68
End: 2018-06-26

## 2018-06-26 NOTE — PROGRESS NOTES
Physician's Report - Neoplastic Disease mailed to [employer], [address], [address].

## 2018-06-28 ENCOUNTER — TELEPHONE (OUTPATIENT)
Dept: HEMATOLOGY/ONCOLOGY | Facility: HOSPITAL | Age: 68
End: 2018-06-28

## 2018-06-28 NOTE — TELEPHONE ENCOUNTER
Spoke to patient - had nausea and reflux symptoms after the first cycle - had protonix added and was better. Will add emend to the next cycle.   Otoniel Marina MD

## 2018-06-30 ENCOUNTER — SNF/IP PROF CHARGE ONLY (OUTPATIENT)
Dept: HEMATOLOGY/ONCOLOGY | Facility: HOSPITAL | Age: 68
End: 2018-06-30

## 2018-06-30 DIAGNOSIS — C25.0 MALIGNANT NEOPLASM OF HEAD OF PANCREAS (HCC): ICD-10-CM

## 2018-06-30 PROCEDURE — G9678 ONCOLOGY CARE MODEL SERVICE: HCPCS | Performed by: INTERNAL MEDICINE

## 2018-07-02 ENCOUNTER — NURSE ONLY (OUTPATIENT)
Dept: HEMATOLOGY/ONCOLOGY | Facility: HOSPITAL | Age: 68
End: 2018-07-02
Attending: INTERNAL MEDICINE
Payer: MEDICARE

## 2018-07-02 DIAGNOSIS — C25.0 MALIGNANT NEOPLASM OF HEAD OF PANCREAS (HCC): ICD-10-CM

## 2018-07-02 LAB
ALBUMIN SERPL-MCNC: 3.3 G/DL (ref 3.5–4.8)
ALP LIVER SERPL-CCNC: 131 U/L (ref 55–142)
ALT SERPL-CCNC: 27 U/L (ref 14–54)
AST SERPL-CCNC: 21 U/L (ref 15–41)
BASOPHILS # BLD AUTO: 0.03 X10(3) UL (ref 0–0.1)
BASOPHILS NFR BLD AUTO: 0.5 %
BILIRUB SERPL-MCNC: 0.3 MG/DL (ref 0.1–2)
BUN BLD-MCNC: 25 MG/DL (ref 8–20)
CALCIUM BLD-MCNC: 9.1 MG/DL (ref 8.3–10.3)
CARBOHYDRATE AG 19-9: 156.3 U/ML (ref ?–37)
CEA: 3.6 NG/ML (ref 0.5–5)
CHLORIDE: 106 MMOL/L (ref 101–111)
CO2: 25 MMOL/L (ref 22–32)
CREAT BLD-MCNC: 0.83 MG/DL (ref 0.55–1.02)
EOSINOPHIL # BLD AUTO: 0.13 X10(3) UL (ref 0–0.3)
EOSINOPHIL NFR BLD AUTO: 2 %
ERYTHROCYTE [DISTWIDTH] IN BLOOD BY AUTOMATED COUNT: 13.3 % (ref 11.5–16)
GLUCOSE BLD-MCNC: 88 MG/DL (ref 70–99)
HCT VFR BLD AUTO: 41.3 % (ref 34–50)
HGB BLD-MCNC: 13.6 G/DL (ref 12–16)
IMMATURE GRANULOCYTE COUNT: 0.02 X10(3) UL (ref 0–1)
IMMATURE GRANULOCYTE RATIO %: 0.3 %
LYMPHOCYTES # BLD AUTO: 2.34 X10(3) UL (ref 0.9–4)
LYMPHOCYTES NFR BLD AUTO: 36.7 %
M PROTEIN MFR SERPL ELPH: 6.9 G/DL (ref 6.1–8.3)
MCH RBC QN AUTO: 31 PG (ref 27–33.2)
MCHC RBC AUTO-ENTMCNC: 32.9 G/DL (ref 31–37)
MCV RBC AUTO: 94.1 FL (ref 81–100)
MONOCYTES # BLD AUTO: 0.51 X10(3) UL (ref 0.1–1)
MONOCYTES NFR BLD AUTO: 8 %
NEUTROPHIL ABS PRELIM: 3.34 X10 (3) UL (ref 1.3–6.7)
NEUTROPHILS # BLD AUTO: 3.34 X10(3) UL (ref 1.3–6.7)
NEUTROPHILS NFR BLD AUTO: 52.5 %
PLATELET # BLD AUTO: 217 10(3)UL (ref 150–450)
POTASSIUM SERPL-SCNC: 4.3 MMOL/L (ref 3.6–5.1)
RBC # BLD AUTO: 4.39 X10(6)UL (ref 3.8–5.1)
RED CELL DISTRIBUTION WIDTH-SD: 45.2 FL (ref 35.1–46.3)
SODIUM SERPL-SCNC: 139 MMOL/L (ref 136–144)
WBC # BLD AUTO: 6.4 X10(3) UL (ref 4–13)

## 2018-07-02 PROCEDURE — 80053 COMPREHEN METABOLIC PANEL: CPT

## 2018-07-02 PROCEDURE — 85025 COMPLETE CBC W/AUTO DIFF WBC: CPT

## 2018-07-02 PROCEDURE — 36415 COLL VENOUS BLD VENIPUNCTURE: CPT

## 2018-07-02 PROCEDURE — 86301 IMMUNOASSAY TUMOR CA 19-9: CPT

## 2018-07-02 PROCEDURE — 82378 CARCINOEMBRYONIC ANTIGEN: CPT

## 2018-07-03 ENCOUNTER — OFFICE VISIT (OUTPATIENT)
Dept: HEMATOLOGY/ONCOLOGY | Facility: HOSPITAL | Age: 68
End: 2018-07-03
Attending: INTERNAL MEDICINE
Payer: MEDICARE

## 2018-07-03 VITALS
HEART RATE: 80 BPM | HEIGHT: 66.18 IN | TEMPERATURE: 96 F | WEIGHT: 193 LBS | RESPIRATION RATE: 18 BRPM | DIASTOLIC BLOOD PRESSURE: 72 MMHG | BODY MASS INDEX: 31.02 KG/M2 | SYSTOLIC BLOOD PRESSURE: 125 MMHG | OXYGEN SATURATION: 98 %

## 2018-07-03 DIAGNOSIS — C25.0 MALIGNANT NEOPLASM OF HEAD OF PANCREAS (HCC): Primary | ICD-10-CM

## 2018-07-03 DIAGNOSIS — C78.7 SECONDARY ADENOCARCINOMA OF LIVER (HCC): ICD-10-CM

## 2018-07-03 DIAGNOSIS — Z51.11 ENCOUNTER FOR CHEMOTHERAPY MANAGEMENT: ICD-10-CM

## 2018-07-03 PROCEDURE — 99214 OFFICE O/P EST MOD 30 MIN: CPT | Performed by: NURSE PRACTITIONER

## 2018-07-03 PROCEDURE — 96415 CHEMO IV INFUSION ADDL HR: CPT

## 2018-07-03 PROCEDURE — 96413 CHEMO IV INFUSION 1 HR: CPT

## 2018-07-03 PROCEDURE — 96368 THER/DIAG CONCURRENT INF: CPT

## 2018-07-03 PROCEDURE — 96375 TX/PRO/DX INJ NEW DRUG ADDON: CPT

## 2018-07-03 PROCEDURE — 96367 TX/PROPH/DG ADDL SEQ IV INF: CPT

## 2018-07-03 RX ORDER — PROCHLORPERAZINE MALEATE 10 MG
10 TABLET ORAL EVERY 6 HOURS PRN
Status: CANCELLED | OUTPATIENT
Start: 2018-07-03

## 2018-07-03 RX ORDER — FLUOROURACIL 50 MG/ML
1500 INJECTION, SOLUTION INTRAVENOUS CONTINUOUS
Status: CANCELLED | OUTPATIENT
Start: 2018-07-03

## 2018-07-03 RX ORDER — LORAZEPAM 2 MG/ML
INJECTION INTRAMUSCULAR EVERY 4 HOURS PRN
Status: CANCELLED | OUTPATIENT
Start: 2018-07-03

## 2018-07-03 RX ORDER — LORAZEPAM 0.5 MG/1
TABLET ORAL EVERY 4 HOURS PRN
Status: CANCELLED | OUTPATIENT
Start: 2018-07-03

## 2018-07-03 RX ORDER — METOCLOPRAMIDE HYDROCHLORIDE 5 MG/ML
10 INJECTION INTRAMUSCULAR; INTRAVENOUS EVERY 6 HOURS PRN
Status: CANCELLED | OUTPATIENT
Start: 2018-07-03

## 2018-07-03 RX ORDER — FLUOROURACIL 50 MG/ML
1500 INJECTION, SOLUTION INTRAVENOUS CONTINUOUS
Status: DISCONTINUED | OUTPATIENT
Start: 2018-07-03 | End: 2018-07-03

## 2018-07-03 RX ORDER — ONDANSETRON 2 MG/ML
8 INJECTION INTRAMUSCULAR; INTRAVENOUS EVERY 6 HOURS PRN
Status: CANCELLED | OUTPATIENT
Start: 2018-07-03

## 2018-07-03 RX ADMIN — FLUOROURACIL 2900 MG: 50 INJECTION, SOLUTION INTRAVENOUS at 14:41:00

## 2018-07-03 NOTE — PROGRESS NOTES
Patient presents with: Follow - Up: APN assessment prior to treatment    Pt is here for treatment C27 D1 folfirinox is expected. Pt states she is feeling good, energy is good, she is eating well. Denies N,V,D,C.  Pt has questions about her tumor markers fo

## 2018-07-03 NOTE — PROGRESS NOTES
ANP Visit Note    Patient Name: Bushra Maguire   YOB: 1950   Medical Record Number: [id number]   CSN: [id number]   Date of visit: 7/3/2018     Chief Complaint/Reason for Visit: Follow Up / metastatic pancreatic cancer     History of Pre History   Marital status:   Spouse name: N/A    Years of education: N/A  Number of children: N/A     Occupational History  None on file     Social History Main Topics   Smoking status: Never Smoker    Smokeless tobacco: Never Used    Alcohol use No Signs  Vitals History 7/3/2018   /72   BP Location Left arm   Patient Position Sitting   Cuff Size large   Pulse 80   Resp 18   Temp 96.4   SpO2 98   Weight 193 lbs   Height 66.181   BODY MASS INDEX 30.98   :HEENT: EOMs intact. PERRL.  Oropharynx is c 07/02/2018  Value: 0.3         Ref range: 0.1 - 2.0 mg/dL    Status: Final  Total Protein                                 Date: 07/02/2018  Value: 6.9         Ref range: 6.1 - 8.3 g/dL     Status: Final  Albumin                                       Date: Ref range: 27.0 - 33.2 pg     Status: Final  MCHC                                          Date: 07/02/2018  Value: 32.9        Ref range: 31.0 - 37.0 g/dL   Status: Final  RDW                                           Date: 07/02/2018  Value: 13.3 Status: Final  Immature Granulocyte %                        Date: 07/02/2018  Value: 0.3         Ref range: %                  Status: Final  ------------    Impression/Plan:    Metastatic pancreatic cancer:  Proceed with FOLFOX add emend and increase st

## 2018-07-03 NOTE — PROGRESS NOTES
Education Record    Learner:  Patient    Disease / Diagnosis:  Pancreatic cancer    Barriers / Limitations:  None   Comments:    Method:  Brief focused   Comments:    General Topics:  Medication, Procedure, Side effects and symptom management and Plan of c

## 2018-07-05 ENCOUNTER — OFFICE VISIT (OUTPATIENT)
Dept: HEMATOLOGY/ONCOLOGY | Facility: HOSPITAL | Age: 68
End: 2018-07-05
Attending: INTERNAL MEDICINE
Payer: MEDICARE

## 2018-07-05 DIAGNOSIS — C25.0 MALIGNANT NEOPLASM OF HEAD OF PANCREAS (HCC): Primary | ICD-10-CM

## 2018-07-05 PROCEDURE — 96372 THER/PROPH/DIAG INJ SC/IM: CPT

## 2018-07-05 PROCEDURE — 96523 IRRIG DRUG DELIVERY DEVICE: CPT

## 2018-07-06 ENCOUNTER — TELEPHONE (OUTPATIENT)
Dept: HEMATOLOGY/ONCOLOGY | Facility: HOSPITAL | Age: 68
End: 2018-07-06

## 2018-07-06 NOTE — TELEPHONE ENCOUNTER
Pt asked if she was drinking enough. She said so far today she has drank 5 glasses of water, 2 cups of coffee and soup. She states her urine is clear. She states her pump was taken off yesterday. She states today she is tired.    She has a slight heada

## 2018-07-11 ENCOUNTER — TELEPHONE (OUTPATIENT)
Dept: HEMATOLOGY/ONCOLOGY | Facility: HOSPITAL | Age: 68
End: 2018-07-11

## 2018-07-11 NOTE — TELEPHONE ENCOUNTER
Patient is scheduled to come in for chemo on Monday. The previous two cycles patient has not felt well due to chronic fatigue and nausea.  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

## 2018-07-16 ENCOUNTER — OFFICE VISIT (OUTPATIENT)
Dept: HEMATOLOGY/ONCOLOGY | Facility: HOSPITAL | Age: 68
End: 2018-07-16
Attending: INTERNAL MEDICINE
Payer: MEDICARE

## 2018-07-16 ENCOUNTER — DIETICIAN VISIT (OUTPATIENT)
Dept: HEMATOLOGY/ONCOLOGY | Facility: HOSPITAL | Age: 68
End: 2018-07-16

## 2018-07-16 VITALS
OXYGEN SATURATION: 97 % | TEMPERATURE: 96 F | DIASTOLIC BLOOD PRESSURE: 83 MMHG | HEART RATE: 74 BPM | WEIGHT: 190.81 LBS | BODY MASS INDEX: 30.67 KG/M2 | SYSTOLIC BLOOD PRESSURE: 147 MMHG | HEIGHT: 66.18 IN | RESPIRATION RATE: 18 BRPM

## 2018-07-16 DIAGNOSIS — C78.7 SECONDARY ADENOCARCINOMA OF LIVER (HCC): Primary | ICD-10-CM

## 2018-07-16 DIAGNOSIS — C25.0 MALIGNANT NEOPLASM OF HEAD OF PANCREAS (HCC): Primary | ICD-10-CM

## 2018-07-16 DIAGNOSIS — C25.0 MALIGNANT NEOPLASM OF HEAD OF PANCREAS (HCC): ICD-10-CM

## 2018-07-16 DIAGNOSIS — Z51.11 ENCOUNTER FOR CHEMOTHERAPY MANAGEMENT: ICD-10-CM

## 2018-07-16 DIAGNOSIS — R11.15 INTRACTABLE CYCLICAL VOMITING WITH NAUSEA: ICD-10-CM

## 2018-07-16 LAB
ALBUMIN SERPL-MCNC: 3.3 G/DL (ref 3.5–4.8)
ALP LIVER SERPL-CCNC: 173 U/L (ref 55–142)
ALT SERPL-CCNC: 56 U/L (ref 14–54)
AST SERPL-CCNC: 31 U/L (ref 15–41)
BASOPHILS # BLD AUTO: 0.04 X10(3) UL (ref 0–0.1)
BASOPHILS NFR BLD AUTO: 0.5 %
BILIRUB SERPL-MCNC: 0.4 MG/DL (ref 0.1–2)
BUN BLD-MCNC: 19 MG/DL (ref 8–20)
CALCIUM BLD-MCNC: 9.5 MG/DL (ref 8.3–10.3)
CARBOHYDRATE AG 19-9: 255.8 U/ML (ref ?–37)
CEA: 3.8 NG/ML (ref 0.5–5)
CHLORIDE: 106 MMOL/L (ref 101–111)
CO2: 25 MMOL/L (ref 22–32)
CREAT BLD-MCNC: 0.83 MG/DL (ref 0.55–1.02)
EOSINOPHIL # BLD AUTO: 0.1 X10(3) UL (ref 0–0.3)
EOSINOPHIL NFR BLD AUTO: 1.3 %
ERYTHROCYTE [DISTWIDTH] IN BLOOD BY AUTOMATED COUNT: 14.1 % (ref 11.5–16)
GLUCOSE BLD-MCNC: 87 MG/DL (ref 70–99)
HCT VFR BLD AUTO: 42 % (ref 34–50)
HGB BLD-MCNC: 13.9 G/DL (ref 12–16)
IMMATURE GRANULOCYTE COUNT: 0.03 X10(3) UL (ref 0–1)
IMMATURE GRANULOCYTE RATIO %: 0.4 %
LYMPHOCYTES # BLD AUTO: 2.21 X10(3) UL (ref 0.9–4)
LYMPHOCYTES NFR BLD AUTO: 28.2 %
M PROTEIN MFR SERPL ELPH: 7 G/DL (ref 6.1–8.3)
MCH RBC QN AUTO: 31.1 PG (ref 27–33.2)
MCHC RBC AUTO-ENTMCNC: 33.1 G/DL (ref 31–37)
MCV RBC AUTO: 94 FL (ref 81–100)
MONOCYTES # BLD AUTO: 0.54 X10(3) UL (ref 0.1–1)
MONOCYTES NFR BLD AUTO: 6.9 %
NEUTROPHIL ABS PRELIM: 4.91 X10 (3) UL (ref 1.3–6.7)
NEUTROPHILS # BLD AUTO: 4.91 X10(3) UL (ref 1.3–6.7)
NEUTROPHILS NFR BLD AUTO: 62.7 %
PLATELET # BLD AUTO: 176 10(3)UL (ref 150–450)
POTASSIUM SERPL-SCNC: 4.2 MMOL/L (ref 3.6–5.1)
RBC # BLD AUTO: 4.47 X10(6)UL (ref 3.8–5.1)
RED CELL DISTRIBUTION WIDTH-SD: 47.1 FL (ref 35.1–46.3)
SODIUM SERPL-SCNC: 138 MMOL/L (ref 136–144)
WBC # BLD AUTO: 7.8 X10(3) UL (ref 4–13)

## 2018-07-16 PROCEDURE — 99214 OFFICE O/P EST MOD 30 MIN: CPT | Performed by: CLINICAL NURSE SPECIALIST

## 2018-07-16 NOTE — PROGRESS NOTES
Patient presents with: Follow - Up: APN assessment prior to treatment    Pt is here for treatment today; C28 D1 folfirinox is expected. Pt relates it takes a longer time between treatments to return to her baseline.  She is needing to take her anti-nausea

## 2018-07-16 NOTE — PROGRESS NOTES
NUTRITION RE-SCREEN COMPLETE: as per dx of metastatic pancreatic cancer. Chart reviewed. Pt appears nutritionally stable. RD continued available prn.

## 2018-07-17 ENCOUNTER — APPOINTMENT (OUTPATIENT)
Dept: HEMATOLOGY/ONCOLOGY | Facility: HOSPITAL | Age: 68
End: 2018-07-17
Attending: INTERNAL MEDICINE
Payer: MEDICARE

## 2018-07-18 NOTE — PROGRESS NOTES
ANP Visit Note    Patient Name: Bushra Maguire   YOB: 1950   Medical Record Number: [id number]   CSN: [id number]   Date of visit: 7/16/2018       Chief Complaint/Reason for Visit:  Patient presents with:   Follow - Up: APN assessment sundeep Spouse name: N/A    Years of education: N/A  Number of children: N/A     Occupational History  None on file     Social History Main Topics   Smoking status: Never Smoker    Smokeless tobacco: Never Used    Alcohol use No    Drug use: No    Sexual activity: Patient Position: Sitting, Cuff Size: large)   Pulse 74   Temp (!) 96.1 °F (35.6 °C) (Tympanic)   Resp 18   Ht 1.681 m (5' 6.18\")   Wt 86.5 kg (190 lb 12.8 oz)   SpO2 97%   BMI 30.63 kg/m²   HEENT: EOMs intact. PERRL. Oropharynx is clear. Neck: No JVD. 450.0 10(3)uL 176.0   RBC Latest Ref Range: 3.80 - 5.10 x10(6)uL 4.47   MCH Latest Ref Range: 27.0 - 33.2 pg 31.1   MCHC Latest Ref Range: 31.0 - 37.0 g/dL 33.1   MCV Latest Ref Range: 81.0 - 100.0 fL 94.0   RDW Latest Ref Range: 11.5 - 16.0 % 14.1   RDW-S Electronically Signed by:    Rossy Santiago ANP-BC  Nurse Practitioner  [facility]

## 2018-07-31 ENCOUNTER — SNF/IP PROF CHARGE ONLY (OUTPATIENT)
Dept: HEMATOLOGY/ONCOLOGY | Facility: HOSPITAL | Age: 68
End: 2018-07-31

## 2018-07-31 DIAGNOSIS — C25.0 MALIGNANT NEOPLASM OF HEAD OF PANCREAS (HCC): ICD-10-CM

## 2018-07-31 PROCEDURE — G9678 ONCOLOGY CARE MODEL SERVICE: HCPCS | Performed by: INTERNAL MEDICINE

## 2018-08-08 DIAGNOSIS — C25.0 MALIGNANT NEOPLASM OF HEAD OF PANCREAS (HCC): Primary | ICD-10-CM

## 2018-08-08 DIAGNOSIS — C78.7 SECONDARY ADENOCARCINOMA OF LIVER (HCC): ICD-10-CM

## 2018-08-09 ENCOUNTER — NURSE ONLY (OUTPATIENT)
Dept: HEMATOLOGY/ONCOLOGY | Facility: HOSPITAL | Age: 68
End: 2018-08-09
Attending: INTERNAL MEDICINE
Payer: MEDICARE

## 2018-08-09 DIAGNOSIS — C78.7 SECONDARY ADENOCARCINOMA OF LIVER (HCC): ICD-10-CM

## 2018-08-09 DIAGNOSIS — C25.0 MALIGNANT NEOPLASM OF HEAD OF PANCREAS (HCC): ICD-10-CM

## 2018-08-09 LAB
ALBUMIN SERPL-MCNC: 3.5 G/DL (ref 3.5–4.8)
ALBUMIN/GLOB SERPL: 1 {RATIO} (ref 1–2)
ALP LIVER SERPL-CCNC: 111 U/L (ref 55–142)
ALT SERPL-CCNC: 32 U/L (ref 14–54)
ANION GAP SERPL CALC-SCNC: 9 MMOL/L (ref 0–18)
AST SERPL-CCNC: 26 U/L (ref 15–41)
BASOPHILS # BLD AUTO: 0.04 X10(3) UL (ref 0–0.1)
BASOPHILS NFR BLD AUTO: 1 %
BILIRUB SERPL-MCNC: 0.4 MG/DL (ref 0.1–2)
BUN BLD-MCNC: 16 MG/DL (ref 8–20)
BUN/CREAT SERPL: 18.6 (ref 10–20)
CALCIUM BLD-MCNC: 9.1 MG/DL (ref 8.3–10.3)
CANCER AG19-9 SERPL-ACNC: 237.5 U/ML (ref ?–37)
CEA SERPL-MCNC: 3.5 NG/ML (ref 0.5–5)
CHLORIDE SERPL-SCNC: 107 MMOL/L (ref 101–111)
CO2 SERPL-SCNC: 24 MMOL/L (ref 22–32)
CREAT BLD-MCNC: 0.86 MG/DL (ref 0.55–1.02)
EOSINOPHIL # BLD AUTO: 0.07 X10(3) UL (ref 0–0.3)
EOSINOPHIL NFR BLD AUTO: 1.7 %
ERYTHROCYTE [DISTWIDTH] IN BLOOD BY AUTOMATED COUNT: 13.5 % (ref 11.5–16)
GLOBULIN PLAS-MCNC: 3.5 G/DL (ref 2.5–3.7)
GLUCOSE BLD-MCNC: 125 MG/DL (ref 70–99)
HCT VFR BLD AUTO: 43.2 % (ref 34–50)
HGB BLD-MCNC: 14.1 G/DL (ref 12–16)
IMMATURE GRANULOCYTE COUNT: 0 X10(3) UL (ref 0–1)
IMMATURE GRANULOCYTE RATIO %: 0 %
LYMPHOCYTES # BLD AUTO: 1.61 X10(3) UL (ref 0.9–4)
LYMPHOCYTES NFR BLD AUTO: 40.1 %
M PROTEIN MFR SERPL ELPH: 7 G/DL (ref 6.1–8.3)
MCH RBC QN AUTO: 31.1 PG (ref 27–33.2)
MCHC RBC AUTO-ENTMCNC: 32.6 G/DL (ref 31–37)
MCV RBC AUTO: 95.2 FL (ref 81–100)
MONOCYTES # BLD AUTO: 0.24 X10(3) UL (ref 0.1–1)
MONOCYTES NFR BLD AUTO: 6 %
NEUTROPHIL ABS PRELIM: 2.05 X10 (3) UL (ref 1.3–6.7)
NEUTROPHILS # BLD AUTO: 2.05 X10(3) UL (ref 1.3–6.7)
NEUTROPHILS NFR BLD AUTO: 51.2 %
OSMOLALITY SERPL CALC.SUM OF ELEC: 293 MOSM/KG (ref 275–295)
PLATELET # BLD AUTO: 246 10(3)UL (ref 150–450)
POTASSIUM SERPL-SCNC: 4 MMOL/L (ref 3.6–5.1)
RBC # BLD AUTO: 4.54 X10(6)UL (ref 3.8–5.1)
RED CELL DISTRIBUTION WIDTH-SD: 47.7 FL (ref 35.1–46.3)
SODIUM SERPL-SCNC: 140 MMOL/L (ref 136–144)
WBC # BLD AUTO: 4 X10(3) UL (ref 4–13)

## 2018-08-09 PROCEDURE — 36415 COLL VENOUS BLD VENIPUNCTURE: CPT

## 2018-08-09 PROCEDURE — 86301 IMMUNOASSAY TUMOR CA 19-9: CPT

## 2018-08-09 PROCEDURE — 80053 COMPREHEN METABOLIC PANEL: CPT

## 2018-08-09 PROCEDURE — 85025 COMPLETE CBC W/AUTO DIFF WBC: CPT

## 2018-08-09 PROCEDURE — 82378 CARCINOEMBRYONIC ANTIGEN: CPT

## 2018-08-10 ENCOUNTER — OFFICE VISIT (OUTPATIENT)
Dept: HEMATOLOGY/ONCOLOGY | Facility: HOSPITAL | Age: 68
End: 2018-08-10
Attending: INTERNAL MEDICINE
Payer: MEDICARE

## 2018-08-10 VITALS
DIASTOLIC BLOOD PRESSURE: 82 MMHG | BODY MASS INDEX: 30.94 KG/M2 | TEMPERATURE: 98 F | HEIGHT: 66.18 IN | WEIGHT: 192.5 LBS | SYSTOLIC BLOOD PRESSURE: 152 MMHG | OXYGEN SATURATION: 97 % | HEART RATE: 76 BPM | RESPIRATION RATE: 18 BRPM

## 2018-08-10 DIAGNOSIS — C25.0 MALIGNANT NEOPLASM OF HEAD OF PANCREAS (HCC): Primary | ICD-10-CM

## 2018-08-10 DIAGNOSIS — C78.7 SECONDARY ADENOCARCINOMA OF LIVER (HCC): ICD-10-CM

## 2018-08-10 DIAGNOSIS — R91.8 PULMONARY NODULES: ICD-10-CM

## 2018-08-10 PROCEDURE — 99214 OFFICE O/P EST MOD 30 MIN: CPT | Performed by: INTERNAL MEDICINE

## 2018-08-10 RX ORDER — CETIRIZINE HYDROCHLORIDE 10 MG/1
10 TABLET ORAL DAILY
COMMUNITY
End: 2019-01-01

## 2018-08-10 NOTE — PROGRESS NOTES
Patient is here for MD f/u to discuss next step. Labs drawn yesterday. Last chemo treatment was on 7/3. Patient has spent the last few weeks in [place]. Patient feels well. Denies pain. Appetite and energy level is good.        Education Record    Learner:

## 2018-08-13 NOTE — PROGRESS NOTES
[facility]    PATIENT'S NAME: Peter Ashford   ATTENDING PHYSICIAN: Beth Velázquez M.D.    PATIENT ACCOUNT #: [de-identified] LOCATION: [address] [place] [address] RECORD #: [id number] YOB: 1950   DATE OF SERVICE: 08/10/2018       TANESHA all.  She returns today with her , having returned from her trips out of town, and she is prepared to be around and would like to know what our plans are at present. She feels extremely well. Her weight is stable. Her appetite is good.   Her energ progressed on so far has been 5-FU/leucovorin. Certainly gemcitabine and nab-paclitaxel are an option for her. Certainly the use of liposomal irinotecan as part of a combination with fluoropyrimidine is also an option.   I told her I would speak with her

## 2018-08-31 ENCOUNTER — HOSPITAL ENCOUNTER (OUTPATIENT)
Dept: CT IMAGING | Facility: HOSPITAL | Age: 68
Discharge: HOME | End: 2018-08-31
Attending: INTERNAL MEDICINE
Payer: MEDICARE

## 2018-08-31 DIAGNOSIS — C25.0 MALIGNANT NEOPLASM OF HEAD OF PANCREAS (HCC): ICD-10-CM

## 2018-08-31 DIAGNOSIS — C78.7 SECONDARY ADENOCARCINOMA OF LIVER (HCC): ICD-10-CM

## 2018-08-31 LAB — CREAT SERPL-MCNC: 0.8 MG/DL (ref 0.55–1.02)

## 2018-08-31 PROCEDURE — 82565 ASSAY OF CREATININE: CPT

## 2018-08-31 PROCEDURE — 74177 CT ABD & PELVIS W/CONTRAST: CPT | Performed by: INTERNAL MEDICINE

## 2018-08-31 PROCEDURE — 71260 CT THORAX DX C+: CPT | Performed by: INTERNAL MEDICINE

## 2018-09-01 ENCOUNTER — APPOINTMENT (OUTPATIENT)
Dept: RADIATION ONCOLOGY | Facility: HOSPITAL | Age: 68
End: 2018-09-01
Attending: RADIOLOGY
Payer: MEDICARE

## 2018-09-06 ENCOUNTER — NURSE ONLY (OUTPATIENT)
Dept: HEMATOLOGY/ONCOLOGY | Facility: HOSPITAL | Age: 68
End: 2018-09-06
Attending: INTERNAL MEDICINE
Payer: MEDICARE

## 2018-09-06 DIAGNOSIS — C25.0 MALIGNANT NEOPLASM OF HEAD OF PANCREAS (HCC): ICD-10-CM

## 2018-09-06 DIAGNOSIS — C78.7 SECONDARY ADENOCARCINOMA OF LIVER (HCC): ICD-10-CM

## 2018-09-06 LAB
ALBUMIN SERPL-MCNC: 3.5 G/DL (ref 3.5–4.8)
ALBUMIN/GLOB SERPL: 1 {RATIO} (ref 1–2)
ALP LIVER SERPL-CCNC: 96 U/L (ref 55–142)
ALT SERPL-CCNC: 22 U/L (ref 14–54)
ANION GAP SERPL CALC-SCNC: 10 MMOL/L (ref 0–18)
AST SERPL-CCNC: 14 U/L (ref 15–41)
BASOPHILS # BLD AUTO: 0.03 X10(3) UL (ref 0–0.1)
BASOPHILS NFR BLD AUTO: 0.5 %
BILIRUB SERPL-MCNC: 0.4 MG/DL (ref 0.1–2)
BUN BLD-MCNC: 17 MG/DL (ref 8–20)
BUN/CREAT SERPL: 21.3 (ref 10–20)
CALCIUM BLD-MCNC: 9 MG/DL (ref 8.3–10.3)
CANCER AG19-9 SERPL-ACNC: 250.6 U/ML (ref ?–37)
CEA SERPL-MCNC: 3.1 NG/ML (ref 0.5–5)
CHLORIDE SERPL-SCNC: 107 MMOL/L (ref 101–111)
CO2 SERPL-SCNC: 23 MMOL/L (ref 22–32)
CREAT BLD-MCNC: 0.8 MG/DL (ref 0.55–1.02)
EOSINOPHIL # BLD AUTO: 0.05 X10(3) UL (ref 0–0.3)
EOSINOPHIL NFR BLD AUTO: 0.8 %
ERYTHROCYTE [DISTWIDTH] IN BLOOD BY AUTOMATED COUNT: 12.9 % (ref 11.5–16)
GLOBULIN PLAS-MCNC: 3.5 G/DL (ref 2.5–4)
GLUCOSE BLD-MCNC: 115 MG/DL (ref 70–99)
HCT VFR BLD AUTO: 41.3 % (ref 34–50)
HGB BLD-MCNC: 13.4 G/DL (ref 12–16)
IMMATURE GRANULOCYTE COUNT: 0.01 X10(3) UL (ref 0–1)
IMMATURE GRANULOCYTE RATIO %: 0.2 %
LYMPHOCYTES # BLD AUTO: 2.29 X10(3) UL (ref 0.9–4)
LYMPHOCYTES NFR BLD AUTO: 35.8 %
M PROTEIN MFR SERPL ELPH: 7 G/DL (ref 6.1–8.3)
MCH RBC QN AUTO: 30.9 PG (ref 27–33.2)
MCHC RBC AUTO-ENTMCNC: 32.4 G/DL (ref 31–37)
MCV RBC AUTO: 95.4 FL (ref 81–100)
MONOCYTES # BLD AUTO: 0.3 X10(3) UL (ref 0.1–1)
MONOCYTES NFR BLD AUTO: 4.7 %
NEUTROPHIL ABS PRELIM: 3.71 X10 (3) UL (ref 1.3–6.7)
NEUTROPHILS # BLD AUTO: 3.71 X10(3) UL (ref 1.3–6.7)
NEUTROPHILS NFR BLD AUTO: 58 %
OSMOLALITY SERPL CALC.SUM OF ELEC: 292 MOSM/KG (ref 275–295)
PLATELET # BLD AUTO: 229 10(3)UL (ref 150–450)
POTASSIUM SERPL-SCNC: 4.2 MMOL/L (ref 3.6–5.1)
RBC # BLD AUTO: 4.33 X10(6)UL (ref 3.8–5.1)
RED CELL DISTRIBUTION WIDTH-SD: 46 FL (ref 35.1–46.3)
SODIUM SERPL-SCNC: 140 MMOL/L (ref 136–144)
WBC # BLD AUTO: 6.4 X10(3) UL (ref 4–13)

## 2018-09-06 PROCEDURE — 80053 COMPREHEN METABOLIC PANEL: CPT

## 2018-09-06 PROCEDURE — 36415 COLL VENOUS BLD VENIPUNCTURE: CPT

## 2018-09-06 PROCEDURE — 85025 COMPLETE CBC W/AUTO DIFF WBC: CPT

## 2018-09-06 PROCEDURE — 86301 IMMUNOASSAY TUMOR CA 19-9: CPT

## 2018-09-06 PROCEDURE — 82378 CARCINOEMBRYONIC ANTIGEN: CPT

## 2018-09-07 ENCOUNTER — OFFICE VISIT (OUTPATIENT)
Dept: HEMATOLOGY/ONCOLOGY | Facility: HOSPITAL | Age: 68
End: 2018-09-07
Attending: INTERNAL MEDICINE
Payer: MEDICARE

## 2018-09-07 VITALS
TEMPERATURE: 97 F | RESPIRATION RATE: 18 BRPM | OXYGEN SATURATION: 98 % | WEIGHT: 190 LBS | DIASTOLIC BLOOD PRESSURE: 83 MMHG | SYSTOLIC BLOOD PRESSURE: 160 MMHG | BODY MASS INDEX: 31 KG/M2 | HEART RATE: 71 BPM

## 2018-09-07 DIAGNOSIS — R91.1 LUNG NODULE: ICD-10-CM

## 2018-09-07 DIAGNOSIS — C25.9 MALIGNANT NEOPLASM OF PANCREAS, UNSPECIFIED LOCATION OF MALIGNANCY (HCC): Primary | ICD-10-CM

## 2018-09-07 DIAGNOSIS — R91.8 PULMONARY NODULES: ICD-10-CM

## 2018-09-07 DIAGNOSIS — C78.7 SECONDARY ADENOCARCINOMA OF LIVER (HCC): ICD-10-CM

## 2018-09-07 PROCEDURE — 99214 OFFICE O/P EST MOD 30 MIN: CPT | Performed by: INTERNAL MEDICINE

## 2018-09-07 NOTE — PROGRESS NOTES
Patient is here for MD f/u. Patient had a ct scan on 8/31. Here to discuss the results. Feeling well. No complaints. Appetite and energy level is good.        Education Record    Learner:  Patient    Disease / Diagnosis:  Pancreas cancer     Barriers / Cloteal Hu

## 2018-09-08 NOTE — PROGRESS NOTES
[facility]    PATIENT'S NAME: Alvaro Chris   ATTENDING PHYSICIAN: Lovely Davies M.D.    PATIENT ACCOUNT #: [de-identified] LOCATION: [address] [place] [address] RECORD #: [id number] YOB: 1950   DATE OF SERVICE: 09/07/2018       TANESHA [employer]. She denies any major problems with nausea or vomiting. She, at times, has issues with endurance, but otherwise has no huge limitations. She has no real residual neuropathic symptoms. She has no cough.   She just had a followup CT scan and is he of treatment now since July and she has had no gross clinical progression of disease, with the exception of some slight growth at the primary site.   Given the fact that we do not know for sure that her pulmonary nodules are due to metastatic disease, I thi

## 2018-09-11 ENCOUNTER — HOSPITAL ENCOUNTER (OUTPATIENT)
Dept: NUCLEAR MEDICINE | Facility: HOSPITAL | Age: 68
Discharge: HOME | End: 2018-09-11
Attending: INTERNAL MEDICINE
Payer: MEDICARE

## 2018-09-11 DIAGNOSIS — R91.1 LUNG NODULE: ICD-10-CM

## 2018-09-11 DIAGNOSIS — C25.9 MALIGNANT NEOPLASM OF PANCREAS, UNSPECIFIED LOCATION OF MALIGNANCY (HCC): ICD-10-CM

## 2018-09-11 LAB — GLUCOSE BLD-MCNC: 87 MG/DL (ref 65–99)

## 2018-09-11 PROCEDURE — 82962 GLUCOSE BLOOD TEST: CPT

## 2018-09-11 PROCEDURE — 78815 PET IMAGE W/CT SKULL-THIGH: CPT | Performed by: INTERNAL MEDICINE

## 2018-09-12 ENCOUNTER — TELEPHONE (OUTPATIENT)
Dept: HEMATOLOGY/ONCOLOGY | Facility: HOSPITAL | Age: 68
End: 2018-09-12

## 2018-09-12 NOTE — TELEPHONE ENCOUNTER
Left message on patient's cell phone. PET shows only area definitively lighting up at the pancreatic head. Consideration for SBRT discussed at [facility] oncology conference. This appears to be reasonable.   Will ask radiation oncology to contact her regarding a

## 2018-09-13 NOTE — PROGRESS NOTES
Nursing Consultation Note  Patient: Aquilino Brennan  YOB: 1950  Age: 76year old  Radiation Oncologist: Dr. Lindsay Jones  Referring Physician: No ref.  provider found  [email or web address]  Consult Date: 9/13/2018      Chemotherapy: N prominence in the right lung base likely artifactual relating to patient positioning on the CT table. 3. Low-density nodule within the right lobe of the thyroid without interval change. 4. Stable nonenhancing intrahepatic cysts.   5. Prior noted low-densi node is within the differential.  Further evaluation with thyroid     Denies N/V/HA/D/C, eating great, energy level up, denies having pain, no neuropathy from chemo, in great spirits                      Review of Systems       Allergies:  No Known Allergi Right  2005: COLONOSCOPY      Comment:  small adenomatous polyp  2/10/16: COLONOSCOPY & POLYPECTOMY      Comment:  diverticulosis and small polyp removed; ASC  08/15/2017: OTHER SURGICAL HISTORY      Comment:  right forearm, plates/screws inserted.      Soc

## 2018-09-14 ENCOUNTER — HOSPITAL ENCOUNTER (OUTPATIENT)
Dept: RADIATION ONCOLOGY | Facility: HOSPITAL | Age: 68
Discharge: HOME | End: 2018-09-14
Attending: RADIOLOGY
Payer: MEDICARE

## 2018-09-14 VITALS
SYSTOLIC BLOOD PRESSURE: 147 MMHG | DIASTOLIC BLOOD PRESSURE: 83 MMHG | BODY MASS INDEX: 31 KG/M2 | OXYGEN SATURATION: 98 % | RESPIRATION RATE: 17 BRPM | HEART RATE: 60 BPM | WEIGHT: 190 LBS

## 2018-09-14 DIAGNOSIS — C25.0 MALIGNANT NEOPLASM OF HEAD OF PANCREAS (HCC): Primary | ICD-10-CM

## 2018-09-14 PROCEDURE — 99214 OFFICE O/P EST MOD 30 MIN: CPT

## 2018-09-14 NOTE — CONSULTS
[facility]    PATIENT'S NAME: Sidney Levine, [address]   RADIATION ONCOLOGIST: Marlon Gaspar. Chelsea Bird M.D.    PATIENT ACCOUNT #: [de-identified] [facility]   [facility]   MEDICAL RECORD #: [id number] YOB: 1950   CONSULTATION DATE: 09/14/2018       RONNIE Radiation Oncology to discuss this treatment. As previously stated, the patient otherwise feels very well. 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   She has a trip planned in the end of October where she will be going down to [facility] with a history of metastatic pancreatic cancer. She has had some marker elevation of late, though it seems to be reasonably stable over the last month or two.   She feels well, but a recent PET scan shows a significant amount of uptake in the area near the patient and her  regarding the potential side effects of treatment. I told them that she should tolerate the treatment quite well and I do not expect much in the way of significant toxicity.   She will have fatigue and may have some GI-related effec should be any questions regarding the radiotherapy, please feel free to contact me at any time. Dictated By Natasha Talley M.D.  d: 09/14/2018 15:23:57  t: 09/14/2018 15:48:14  [facility] [id number]  NAD/    cc: Lucía Blevins M.D.    Dr. Fransico Booth

## 2018-09-18 PROCEDURE — 77470 SPECIAL RADIATION TREATMENT: CPT | Performed by: RADIOLOGY

## 2018-09-18 PROCEDURE — 77290 THER RAD SIMULAJ FIELD CPLX: CPT | Performed by: RADIOLOGY

## 2018-09-25 PROCEDURE — 77295 3-D RADIOTHERAPY PLAN: CPT | Performed by: RADIOLOGY

## 2018-09-25 PROCEDURE — 77293 RESPIRATOR MOTION MGMT SIMUL: CPT | Performed by: RADIOLOGY

## 2018-09-25 PROCEDURE — 77370 RADIATION PHYSICS CONSULT: CPT | Performed by: RADIOLOGY

## 2018-09-28 PROCEDURE — 77334 RADIATION TREATMENT AID(S): CPT | Performed by: RADIOLOGY

## 2018-09-28 PROCEDURE — 77280 THER RAD SIMULAJ FIELD SMPL: CPT | Performed by: RADIOLOGY

## 2018-09-28 PROCEDURE — 77300 RADIATION THERAPY DOSE PLAN: CPT | Performed by: RADIOLOGY

## 2018-09-28 PROCEDURE — 77373 STRTCTC BDY RAD THER TX DLVR: CPT | Performed by: RADIOLOGY

## 2018-10-01 ENCOUNTER — APPOINTMENT (OUTPATIENT)
Dept: RADIATION ONCOLOGY | Facility: HOSPITAL | Age: 68
End: 2018-10-01
Attending: RADIOLOGY
Payer: MEDICARE

## 2018-10-01 PROCEDURE — 77373 STRTCTC BDY RAD THER TX DLVR: CPT | Performed by: RADIOLOGY

## 2018-10-03 ENCOUNTER — APPOINTMENT (OUTPATIENT)
Dept: RADIATION ONCOLOGY | Facility: HOSPITAL | Age: 68
End: 2018-10-03
Attending: RADIOLOGY
Payer: MEDICARE

## 2018-10-03 ENCOUNTER — DOCUMENTATION ONLY (OUTPATIENT)
Dept: RADIATION ONCOLOGY | Facility: HOSPITAL | Age: 68
End: 2018-10-03

## 2018-10-03 VITALS — WEIGHT: 190 LBS | BODY MASS INDEX: 31 KG/M2

## 2018-10-03 PROCEDURE — 77373 STRTCTC BDY RAD THER TX DLVR: CPT | Performed by: RADIOLOGY

## 2018-10-03 NOTE — PROGRESS NOTES
Saw pt post treatment today because she stated she was feeling fatigue and sometimes some nausea. States that it is all in her head because she is eating and drinking well. Her weight is stable. Pt made aware fatigue will resolve once treatment is over.

## 2018-10-05 PROCEDURE — 77373 STRTCTC BDY RAD THER TX DLVR: CPT | Performed by: RADIOLOGY

## 2018-10-08 ENCOUNTER — APPOINTMENT (OUTPATIENT)
Dept: RADIATION ONCOLOGY | Facility: HOSPITAL | Age: 68
End: 2018-10-08
Attending: RADIOLOGY
Payer: MEDICARE

## 2018-10-08 VITALS
HEART RATE: 58 BPM | OXYGEN SATURATION: 100 % | TEMPERATURE: 98 F | DIASTOLIC BLOOD PRESSURE: 98 MMHG | RESPIRATION RATE: 18 BRPM | BODY MASS INDEX: 30 KG/M2 | WEIGHT: 189.19 LBS | SYSTOLIC BLOOD PRESSURE: 172 MMHG

## 2018-10-08 DIAGNOSIS — C25.0 MALIGNANT NEOPLASM OF HEAD OF PANCREAS (HCC): Primary | ICD-10-CM

## 2018-10-08 PROCEDURE — 77373 STRTCTC BDY RAD THER TX DLVR: CPT | Performed by: RADIOLOGY

## 2018-10-08 NOTE — PROGRESS NOTES
[facility] Radiation Treatment Management Note 1-5    Patient:  Tiny Reeder  Age:  76year old  Visit Diagnosis:    1.  Malignant neoplasm of head of pancreas [facility])      Primary Rad/Onc:  Dr. Blessing Topete    Site Delivered

## 2018-10-08 NOTE — PATIENT INSTRUCTIONS
Dona Apodaca to contact you in regard to 4 week follow up with Dr. Hardik Pride. He will order labs please have them done prior to 4 week follow up.

## 2018-10-09 PROCEDURE — 77336 RADIATION PHYSICS CONSULT: CPT | Performed by: RADIOLOGY

## 2018-10-17 NOTE — PROGRESS NOTES
[facility]    PATIENT'S NAME: Josr Redding, [address]   RADIATION ONCOLOGIST: Sue Hicks.  Rohan Rowell MD   PATIENT ACCOUNT #: [de-identified] LOCATION: [address] [place] [address] RECORD #: [id number] YOB: 1950   DATE: 10/08/2018       RADIATION 09/28/2018 and completed on 10/08/2018, for a total of 11 elapsed days in which she received all 5 prescribed radiation treatments. TREATMENT SUMMARY:  We opted to treat the larger peripancreatic mass which was more active and clearly involved.   As for

## 2018-11-13 ENCOUNTER — NURSE ONLY (OUTPATIENT)
Dept: HEMATOLOGY/ONCOLOGY | Facility: HOSPITAL | Age: 68
End: 2018-11-13
Attending: INTERNAL MEDICINE
Payer: MEDICARE

## 2018-11-13 PROCEDURE — 86301 IMMUNOASSAY TUMOR CA 19-9: CPT | Performed by: RADIOLOGY

## 2018-11-13 PROCEDURE — 36415 COLL VENOUS BLD VENIPUNCTURE: CPT

## 2018-11-14 ENCOUNTER — HOSPITAL ENCOUNTER (OUTPATIENT)
Dept: RADIATION ONCOLOGY | Facility: HOSPITAL | Age: 68
Discharge: HOME | End: 2018-11-14
Attending: RADIOLOGY
Payer: MEDICARE

## 2018-11-14 ENCOUNTER — NURSE ONLY (OUTPATIENT)
Dept: HEMATOLOGY/ONCOLOGY | Facility: HOSPITAL | Age: 68
End: 2018-11-14
Attending: INTERNAL MEDICINE
Payer: MEDICARE

## 2018-11-14 VITALS
HEART RATE: 80 BPM | OXYGEN SATURATION: 98 % | DIASTOLIC BLOOD PRESSURE: 96 MMHG | BODY MASS INDEX: 30 KG/M2 | RESPIRATION RATE: 18 BRPM | SYSTOLIC BLOOD PRESSURE: 160 MMHG | WEIGHT: 189 LBS

## 2018-11-14 DIAGNOSIS — R11.15 INTRACTABLE CYCLICAL VOMITING WITH NAUSEA: Primary | ICD-10-CM

## 2018-11-14 DIAGNOSIS — C25.0 MALIGNANT NEOPLASM OF HEAD OF PANCREAS (HCC): ICD-10-CM

## 2018-11-14 DIAGNOSIS — C25.0 MALIGNANT NEOPLASM OF HEAD OF PANCREAS (HCC): Primary | ICD-10-CM

## 2018-11-14 PROCEDURE — 96523 IRRIG DRUG DELIVERY DEVICE: CPT

## 2018-11-14 PROCEDURE — 99213 OFFICE O/P EST LOW 20 MIN: CPT

## 2018-11-14 RX ORDER — SODIUM CHLORIDE 0.9 % (FLUSH) 0.9 %
10 SYRINGE (ML) INJECTION ONCE
Status: COMPLETED | OUTPATIENT
Start: 2018-11-14 | End: 2018-11-14

## 2018-11-14 RX ORDER — SODIUM CHLORIDE 0.9 % (FLUSH) 0.9 %
10 SYRINGE (ML) INJECTION ONCE
Status: CANCELLED | OUTPATIENT
Start: 2018-11-14

## 2018-11-14 RX ADMIN — SODIUM CHLORIDE 0.9 % (FLUSH) 10 ML: 0.9 % SYRINGE (ML) INJECTION at 10:15:00

## 2018-11-14 NOTE — PATIENT INSTRUCTIONS
- CALL [phone] FOR A FOLLOW-UP WITH DR. WENDI DIAMOND AS NEEDED  - FOLLOW-UP WITH DR. HALEY  - CALL [phone] TO SCHEDULE YOUR PET SCAN  - CALL IF YOU HAVE ANY QUESTIONS/CONCERNS REGARDING RADIATION THERAPY  [phone]

## 2018-11-14 NOTE — PROGRESS NOTES
Nursing Follow-Up Note    Patient: Adam Mcknight  YOB: 1950  Age: 76year old  Radiation Oncologist: Dr. Vik Chong  Referring Physician: Misbah Garcia  Chief Complaint: No chief complaint on file.     Date: 11/14/2018        V

## 2018-11-15 NOTE — PROGRESS NOTES
[facility]    PATIENT'S NAME: Pinky Ford, [address]   RADIATION ONCOLOGIST: Tarcey Kay. Carolene Peabody, M.D.    PATIENT ACCOUNT #: [de-identified] [facility]   MEDICAL RECORD #: [id number] YOB: 1950   FOLLOW-UP DATE: 11/14/2018       RADI today, the patient feels very well. She never had much in the way of side effects or morbidity from the radiation.   In the interim, she has been traveling extensively and has gone down to see her son at [facility], [facility] and has also been up to the [facility] a good response to the treatment site and that additional radiation to the sole other location could be worthwhile.   Clearly, there is no discussion that the radiation would have any impact upon survival but it may be able to help prevent her from going on

## 2018-11-20 ENCOUNTER — HOSPITAL ENCOUNTER (OUTPATIENT)
Dept: NUCLEAR MEDICINE | Facility: HOSPITAL | Age: 68
Discharge: HOME | End: 2018-11-20
Attending: RADIOLOGY
Payer: MEDICARE

## 2018-11-20 DIAGNOSIS — C25.0 MALIGNANT NEOPLASM OF HEAD OF PANCREAS (HCC): ICD-10-CM

## 2018-11-20 PROCEDURE — 78815 PET IMAGE W/CT SKULL-THIGH: CPT | Performed by: RADIOLOGY

## 2018-11-20 PROCEDURE — 82962 GLUCOSE BLOOD TEST: CPT

## 2018-11-27 DIAGNOSIS — C25.0 MALIGNANT NEOPLASM OF HEAD OF PANCREAS (HCC): Primary | ICD-10-CM

## 2018-11-29 ENCOUNTER — NURSE ONLY (OUTPATIENT)
Dept: HEMATOLOGY/ONCOLOGY | Facility: HOSPITAL | Age: 68
End: 2018-11-29
Attending: INTERNAL MEDICINE
Payer: MEDICARE

## 2018-11-29 DIAGNOSIS — C25.0 MALIGNANT NEOPLASM OF HEAD OF PANCREAS (HCC): ICD-10-CM

## 2018-11-29 PROCEDURE — 80053 COMPREHEN METABOLIC PANEL: CPT

## 2018-11-29 PROCEDURE — 82378 CARCINOEMBRYONIC ANTIGEN: CPT

## 2018-11-29 PROCEDURE — 36415 COLL VENOUS BLD VENIPUNCTURE: CPT

## 2018-11-29 PROCEDURE — 85025 COMPLETE CBC W/AUTO DIFF WBC: CPT

## 2018-11-30 ENCOUNTER — OFFICE VISIT (OUTPATIENT)
Dept: HEMATOLOGY/ONCOLOGY | Facility: HOSPITAL | Age: 68
End: 2018-11-30
Attending: INTERNAL MEDICINE
Payer: MEDICARE

## 2018-11-30 VITALS
SYSTOLIC BLOOD PRESSURE: 153 MMHG | HEIGHT: 66.18 IN | WEIGHT: 187.81 LBS | BODY MASS INDEX: 30.18 KG/M2 | DIASTOLIC BLOOD PRESSURE: 85 MMHG | HEART RATE: 76 BPM | TEMPERATURE: 98 F | OXYGEN SATURATION: 98 % | RESPIRATION RATE: 18 BRPM

## 2018-11-30 DIAGNOSIS — C77.2 SECONDARY MALIGNANT NEOPLASM OF RETROPERITONEAL LYMPH NODES (HCC): ICD-10-CM

## 2018-11-30 DIAGNOSIS — C25.0 MALIGNANT NEOPLASM OF HEAD OF PANCREAS (HCC): ICD-10-CM

## 2018-11-30 DIAGNOSIS — C78.7 SECONDARY ADENOCARCINOMA OF LIVER (HCC): Primary | ICD-10-CM

## 2018-11-30 PROCEDURE — 99214 OFFICE O/P EST MOD 30 MIN: CPT | Performed by: INTERNAL MEDICINE

## 2018-11-30 RX ORDER — FLUOXETINE 20 MG/1
20 TABLET, FILM COATED ORAL DAILY
COMMUNITY
Start: 2018-11-28

## 2018-11-30 NOTE — PROGRESS NOTES
Patient is here for MD f/u for pancreatic cancer. Patient had a PET on 11/20. Here to discuss the results and the next step. Eating well. Energy level is good. Denies pain. No complaints.        Education Record    Learner:  Patient    Disease / Diagnosis:

## 2018-12-02 PROBLEM — C77.2 SECONDARY MALIGNANT NEOPLASM OF RETROPERITONEAL LYMPH NODES (HCC): Status: ACTIVE | Noted: 2018-12-02

## 2018-12-03 NOTE — PROGRESS NOTES
[facility]    PATIENT'S NAME: Rodney Noble   ATTENDING PHYSICIAN: Jonathan Ledezma M.D.    PATIENT ACCOUNT #: [de-identified] LOCATION: [address] [place] [address] RECORD #: [id number] YOB: 1950   DATE OF SERVICE: 11/30/2018       TANESHA and then finally a PET scan on December 11 showed focal area of involvement in a peripancreatic lymph node adjacent to the uncinate process. There was minimal left periaortic retroperitoneal nodes that were borderline.   The patient was treated by Dr. Elvin Ponce lesions. LYMPHATICS:  She has no cervical, supraclavicular, or axillary adenopathy. LUNGS:  Resonant to percussion and clear to auscultation. No wheezing, rales, or rhonchi. HEART:  Normal.  ABDOMEN:  No hepatosplenomegaly or tenderness.    EXTREMITIES: see if they could consider doing a panel on her. [facility]CCN has more recently started recommending germline genetic testing in pancreatic cancer patients.   Alternatively, doing next generation sequencing on her tumor would be also a desirable procedure; however

## 2018-12-11 NOTE — PROGRESS NOTES
[facility]    PATIENT'S NAME: Leila Patel   ATTENDING PHYSICIAN: Rosy Monte M.D.    PATIENT ACCOUNT #: [de-identified] LOCATION: [address] [place] [address] RECORD #: [id number] YOB: 1950   DATE OF SERVICE: 03/21/2017       TANESHA well-developed, well-nourished female in no acute distress. VITAL SIGNS:  Performance status is 0. Weight 166 pounds, which is up a few pounds; blood pressure 147/93; pulse 73; respiratory rate 20; temperature 97.7. HEENT:  Unremarkable.   She has pink c Render Post-Care Instructions In Note?: yes Detail Level: Simple Consent: The patient's consent was obtained including but not limited to risks of crusting, scabbing, blistering, scarring, darker or lighter pigmentary change, recurrence, incomplete removal and infection. Post-Care Instructions: I reviewed with the patient in detail post-care instructions. Patient is to wear sunprotection, and avoid picking at any of the treated lesions. Pt may apply Vaseline to crusted or scabbing areas. Duration Of Freeze Thaw-Cycle (Seconds): 0

## 2018-12-12 ENCOUNTER — GENETICS ENCOUNTER (OUTPATIENT)
Dept: GENETICS | Facility: HOSPITAL | Age: 68
End: 2018-12-12
Attending: INTERNAL MEDICINE
Payer: MEDICARE

## 2018-12-12 DIAGNOSIS — C25.0 MALIGNANT NEOPLASM OF HEAD OF PANCREAS (HCC): Primary | ICD-10-CM

## 2018-12-12 PROCEDURE — 96040 HC GENETIC COUNSELING EA 30 MIN: CPT | Performed by: GENETIC COUNSELOR, MS

## 2018-12-12 NOTE — PROGRESS NOTES
Referring Provider:  Dr. Kenya Andres    Reason for Referral:  Ms. Blank Vidal was referred for genetic counseling because of a personal history of pancreatic cancer. Ms. Poppy Hager is a 76year-old woman of  descent.       Medical History: Multiple Mole Melanoma Syndrome (FAMM), Gay syndrome, and hereditary pancreatitis. Other risk factors for pancreatic cancer include: a history of chronic pancreatitis, cigarette smoking, obesity, and adult-onset diabetes.   Approximately 2-5% of unselect available for individuals with mutations in these genes. Mutations in some genes, such as BRCA1/2, may allow for consideration of additional treatments or research trials, such as PARP inhibitors.        If she were to test positive for a deleterious mut

## 2018-12-19 ENCOUNTER — GENETICS ENCOUNTER (OUTPATIENT)
Dept: HEMATOLOGY/ONCOLOGY | Facility: HOSPITAL | Age: 68
End: 2018-12-19

## 2018-12-19 NOTE — PROGRESS NOTES
Referring Provider:  Dr. Adrienne Glynn    Reason for Referral:  Ms. Lakia Nicholson had [employer]’s Common Hereditary Cancers panel genetic testing performed on 12/12/2018 because of a personal history of pancreatic cancer.     Genetic Testing Result:  No Thomas Lawrence should contact me on an annual basis to see if the VUS has been reclassified and to learn if there have been any updates in genetic testing that would apply to her. In the interim, Ms. Hart and her relatives should speak with their [de-identified]

## 2019-01-01 ENCOUNTER — TELEPHONE (OUTPATIENT)
Dept: HEMATOLOGY/ONCOLOGY | Facility: HOSPITAL | Age: 69
End: 2019-01-01

## 2019-01-01 ENCOUNTER — NURSE ONLY (OUTPATIENT)
Dept: HEMATOLOGY/ONCOLOGY | Facility: HOSPITAL | Age: 69
End: 2019-01-01
Attending: INTERNAL MEDICINE
Payer: MEDICARE

## 2019-01-01 ENCOUNTER — SNF/IP PROF CHARGE ONLY (OUTPATIENT)
Dept: HEMATOLOGY/ONCOLOGY | Facility: HOSPITAL | Age: 69
End: 2019-01-01

## 2019-01-01 ENCOUNTER — ANESTHESIA (OUTPATIENT)
Dept: SURGERY | Facility: HOSPITAL | Age: 69
End: 2019-01-01
Payer: MEDICARE

## 2019-01-01 ENCOUNTER — HOSPITAL ENCOUNTER (OUTPATIENT)
Dept: INTERVENTIONAL RADIOLOGY/VASCULAR | Facility: HOSPITAL | Age: 69
Discharge: HOME | End: 2019-01-01
Attending: RADIOLOGY | Admitting: RADIOLOGY
Payer: MEDICARE

## 2019-01-01 ENCOUNTER — HOSPITAL ENCOUNTER (OUTPATIENT)
Dept: CT IMAGING | Facility: HOSPITAL | Age: 69
Discharge: HOME | End: 2019-01-01
Attending: INTERNAL MEDICINE
Payer: MEDICARE

## 2019-01-01 ENCOUNTER — HOSPITAL ENCOUNTER (OUTPATIENT)
Facility: HOSPITAL | Age: 69
Setting detail: HOSPITAL OUTPATIENT SURGERY
Discharge: HOME | End: 2019-01-01
Attending: UROLOGY | Admitting: UROLOGY
Payer: MEDICARE

## 2019-01-01 ENCOUNTER — ANESTHESIA EVENT (OUTPATIENT)
Dept: SURGERY | Facility: HOSPITAL | Age: 69
End: 2019-01-01
Payer: MEDICARE

## 2019-01-01 ENCOUNTER — TELEPHONE (OUTPATIENT)
Dept: SURGERY | Facility: CLINIC | Age: 69
End: 2019-01-01

## 2019-01-01 ENCOUNTER — LAB ENCOUNTER (OUTPATIENT)
Dept: LAB | Facility: HOSPITAL | Age: 69
End: 2019-01-01
Attending: INTERNAL MEDICINE
Payer: MEDICARE

## 2019-01-01 ENCOUNTER — APPOINTMENT (OUTPATIENT)
Dept: GENERAL RADIOLOGY | Facility: HOSPITAL | Age: 69
End: 2019-01-01
Attending: UROLOGY
Payer: MEDICARE

## 2019-01-01 ENCOUNTER — DIETICIAN VISIT (OUTPATIENT)
Dept: HEMATOLOGY/ONCOLOGY | Facility: HOSPITAL | Age: 69
End: 2019-01-01

## 2019-01-01 ENCOUNTER — HOSPITAL ENCOUNTER (OUTPATIENT)
Dept: INTERVENTIONAL RADIOLOGY/VASCULAR | Facility: HOSPITAL | Age: 69
Discharge: HOME | End: 2019-01-01
Attending: INTERNAL MEDICINE | Admitting: INTERNAL MEDICINE
Payer: MEDICARE

## 2019-01-01 ENCOUNTER — HOSPITAL ENCOUNTER (OUTPATIENT)
Dept: MRI IMAGING | Facility: HOSPITAL | Age: 69
Discharge: HOME | End: 2019-01-01
Attending: INTERNAL MEDICINE
Payer: MEDICARE

## 2019-01-01 VITALS
BODY MASS INDEX: 27.54 KG/M2 | HEIGHT: 66.26 IN | OXYGEN SATURATION: 98 % | DIASTOLIC BLOOD PRESSURE: 80 MMHG | HEART RATE: 84 BPM | WEIGHT: 171.38 LBS | TEMPERATURE: 98 F | SYSTOLIC BLOOD PRESSURE: 147 MMHG | RESPIRATION RATE: 18 BRPM

## 2019-01-01 VITALS
SYSTOLIC BLOOD PRESSURE: 146 MMHG | TEMPERATURE: 97 F | DIASTOLIC BLOOD PRESSURE: 86 MMHG | HEART RATE: 92 BPM | WEIGHT: 179 LBS | OXYGEN SATURATION: 98 % | RESPIRATION RATE: 18 BRPM | BODY MASS INDEX: 29 KG/M2

## 2019-01-01 VITALS
HEART RATE: 79 BPM | BODY MASS INDEX: 28 KG/M2 | SYSTOLIC BLOOD PRESSURE: 121 MMHG | OXYGEN SATURATION: 98 % | WEIGHT: 175 LBS | DIASTOLIC BLOOD PRESSURE: 79 MMHG | TEMPERATURE: 97 F | RESPIRATION RATE: 18 BRPM

## 2019-01-01 VITALS
BODY MASS INDEX: 28.13 KG/M2 | OXYGEN SATURATION: 97 % | DIASTOLIC BLOOD PRESSURE: 86 MMHG | WEIGHT: 175 LBS | RESPIRATION RATE: 18 BRPM | HEART RATE: 72 BPM | HEIGHT: 66.26 IN | SYSTOLIC BLOOD PRESSURE: 149 MMHG | TEMPERATURE: 97 F

## 2019-01-01 VITALS
BODY MASS INDEX: 26.92 KG/M2 | SYSTOLIC BLOOD PRESSURE: 145 MMHG | OXYGEN SATURATION: 98 % | TEMPERATURE: 98 F | RESPIRATION RATE: 18 BRPM | HEART RATE: 79 BPM | WEIGHT: 171.5 LBS | HEIGHT: 67 IN | DIASTOLIC BLOOD PRESSURE: 81 MMHG

## 2019-01-01 VITALS
SYSTOLIC BLOOD PRESSURE: 118 MMHG | RESPIRATION RATE: 18 BRPM | HEART RATE: 59 BPM | OXYGEN SATURATION: 99 % | DIASTOLIC BLOOD PRESSURE: 57 MMHG | TEMPERATURE: 99 F

## 2019-01-01 VITALS
SYSTOLIC BLOOD PRESSURE: 163 MMHG | OXYGEN SATURATION: 98 % | RESPIRATION RATE: 18 BRPM | DIASTOLIC BLOOD PRESSURE: 91 MMHG | BODY MASS INDEX: 26.18 KG/M2 | TEMPERATURE: 98 F | WEIGHT: 166 LBS | BODY MASS INDEX: 26.06 KG/M2 | HEIGHT: 67.09 IN | HEIGHT: 67.09 IN | HEART RATE: 95 BPM | WEIGHT: 166.81 LBS

## 2019-01-01 VITALS
HEIGHT: 66.26 IN | OXYGEN SATURATION: 98 % | TEMPERATURE: 97 F | DIASTOLIC BLOOD PRESSURE: 87 MMHG | BODY MASS INDEX: 29.06 KG/M2 | SYSTOLIC BLOOD PRESSURE: 163 MMHG | RESPIRATION RATE: 16 BRPM | WEIGHT: 180.81 LBS | HEART RATE: 76 BPM

## 2019-01-01 VITALS
OXYGEN SATURATION: 98 % | BODY MASS INDEX: 27.8 KG/M2 | RESPIRATION RATE: 16 BRPM | TEMPERATURE: 97 F | WEIGHT: 173 LBS | DIASTOLIC BLOOD PRESSURE: 83 MMHG | SYSTOLIC BLOOD PRESSURE: 148 MMHG | HEART RATE: 95 BPM | HEIGHT: 66.26 IN

## 2019-01-01 VITALS
HEIGHT: 66.26 IN | TEMPERATURE: 98 F | BODY MASS INDEX: 28.06 KG/M2 | DIASTOLIC BLOOD PRESSURE: 84 MMHG | RESPIRATION RATE: 16 BRPM | SYSTOLIC BLOOD PRESSURE: 147 MMHG | OXYGEN SATURATION: 98 % | HEART RATE: 71 BPM | WEIGHT: 174.63 LBS

## 2019-01-01 VITALS
HEIGHT: 66.26 IN | HEART RATE: 85 BPM | OXYGEN SATURATION: 98 % | DIASTOLIC BLOOD PRESSURE: 91 MMHG | RESPIRATION RATE: 16 BRPM | BODY MASS INDEX: 28.19 KG/M2 | SYSTOLIC BLOOD PRESSURE: 167 MMHG | TEMPERATURE: 97 F | WEIGHT: 175.38 LBS

## 2019-01-01 VITALS
RESPIRATION RATE: 18 BRPM | SYSTOLIC BLOOD PRESSURE: 126 MMHG | HEART RATE: 92 BPM | DIASTOLIC BLOOD PRESSURE: 79 MMHG | WEIGHT: 161.63 LBS | HEIGHT: 67.09 IN | BODY MASS INDEX: 25.37 KG/M2 | OXYGEN SATURATION: 98 % | TEMPERATURE: 98 F

## 2019-01-01 VITALS
HEIGHT: 66.26 IN | OXYGEN SATURATION: 98 % | WEIGHT: 183 LBS | RESPIRATION RATE: 18 BRPM | SYSTOLIC BLOOD PRESSURE: 151 MMHG | DIASTOLIC BLOOD PRESSURE: 84 MMHG | TEMPERATURE: 98 F | HEART RATE: 82 BPM | BODY MASS INDEX: 29.41 KG/M2

## 2019-01-01 VITALS
DIASTOLIC BLOOD PRESSURE: 85 MMHG | HEART RATE: 74 BPM | RESPIRATION RATE: 18 BRPM | WEIGHT: 167.63 LBS | HEIGHT: 67.09 IN | OXYGEN SATURATION: 99 % | TEMPERATURE: 97 F | BODY MASS INDEX: 26.31 KG/M2 | SYSTOLIC BLOOD PRESSURE: 147 MMHG

## 2019-01-01 VITALS
HEART RATE: 97 BPM | BODY MASS INDEX: 24.61 KG/M2 | HEIGHT: 67.09 IN | WEIGHT: 156.81 LBS | SYSTOLIC BLOOD PRESSURE: 150 MMHG | RESPIRATION RATE: 18 BRPM | TEMPERATURE: 97 F | DIASTOLIC BLOOD PRESSURE: 85 MMHG | OXYGEN SATURATION: 96 %

## 2019-01-01 VITALS
WEIGHT: 173 LBS | OXYGEN SATURATION: 98 % | HEART RATE: 77 BPM | DIASTOLIC BLOOD PRESSURE: 80 MMHG | TEMPERATURE: 97 F | HEIGHT: 66.26 IN | BODY MASS INDEX: 27.8 KG/M2 | RESPIRATION RATE: 18 BRPM | SYSTOLIC BLOOD PRESSURE: 142 MMHG

## 2019-01-01 VITALS
DIASTOLIC BLOOD PRESSURE: 79 MMHG | HEIGHT: 66.26 IN | BODY MASS INDEX: 29 KG/M2 | OXYGEN SATURATION: 99 % | SYSTOLIC BLOOD PRESSURE: 143 MMHG | RESPIRATION RATE: 18 BRPM | TEMPERATURE: 98 F | HEART RATE: 60 BPM

## 2019-01-01 VITALS
DIASTOLIC BLOOD PRESSURE: 91 MMHG | BODY MASS INDEX: 27 KG/M2 | HEART RATE: 83 BPM | WEIGHT: 166.38 LBS | RESPIRATION RATE: 18 BRPM | OXYGEN SATURATION: 99 % | SYSTOLIC BLOOD PRESSURE: 147 MMHG | TEMPERATURE: 98 F

## 2019-01-01 VITALS
TEMPERATURE: 97 F | HEIGHT: 66.26 IN | BODY MASS INDEX: 27.83 KG/M2 | OXYGEN SATURATION: 99 % | SYSTOLIC BLOOD PRESSURE: 123 MMHG | RESPIRATION RATE: 16 BRPM | DIASTOLIC BLOOD PRESSURE: 79 MMHG | HEART RATE: 82 BPM | WEIGHT: 173.19 LBS

## 2019-01-01 VITALS
HEART RATE: 75 BPM | OXYGEN SATURATION: 98 % | SYSTOLIC BLOOD PRESSURE: 132 MMHG | TEMPERATURE: 98 F | DIASTOLIC BLOOD PRESSURE: 59 MMHG | RESPIRATION RATE: 14 BRPM

## 2019-01-01 VITALS
WEIGHT: 173.63 LBS | OXYGEN SATURATION: 98 % | RESPIRATION RATE: 16 BRPM | BODY MASS INDEX: 27.91 KG/M2 | DIASTOLIC BLOOD PRESSURE: 88 MMHG | HEART RATE: 78 BPM | SYSTOLIC BLOOD PRESSURE: 128 MMHG | TEMPERATURE: 97 F | HEIGHT: 66.26 IN

## 2019-01-01 VITALS
WEIGHT: 181 LBS | DIASTOLIC BLOOD PRESSURE: 84 MMHG | BODY MASS INDEX: 29.09 KG/M2 | SYSTOLIC BLOOD PRESSURE: 130 MMHG | RESPIRATION RATE: 20 BRPM | TEMPERATURE: 97 F | HEIGHT: 66.26 IN | OXYGEN SATURATION: 98 % | HEART RATE: 72 BPM

## 2019-01-01 VITALS
BODY MASS INDEX: 28.88 KG/M2 | OXYGEN SATURATION: 98 % | RESPIRATION RATE: 18 BRPM | SYSTOLIC BLOOD PRESSURE: 144 MMHG | HEIGHT: 66.26 IN | HEART RATE: 76 BPM | TEMPERATURE: 96 F | DIASTOLIC BLOOD PRESSURE: 82 MMHG | WEIGHT: 179.69 LBS

## 2019-01-01 VITALS
DIASTOLIC BLOOD PRESSURE: 88 MMHG | SYSTOLIC BLOOD PRESSURE: 145 MMHG | OXYGEN SATURATION: 98 % | WEIGHT: 174.63 LBS | RESPIRATION RATE: 18 BRPM | HEART RATE: 77 BPM | TEMPERATURE: 98 F | BODY MASS INDEX: 28.06 KG/M2 | HEIGHT: 66.26 IN

## 2019-01-01 VITALS
WEIGHT: 160.19 LBS | BODY MASS INDEX: 25.14 KG/M2 | HEIGHT: 67.09 IN | SYSTOLIC BLOOD PRESSURE: 135 MMHG | DIASTOLIC BLOOD PRESSURE: 85 MMHG | TEMPERATURE: 98 F | HEART RATE: 111 BPM | OXYGEN SATURATION: 99 % | RESPIRATION RATE: 20 BRPM

## 2019-01-01 VITALS
OXYGEN SATURATION: 98 % | TEMPERATURE: 97 F | HEART RATE: 99 BPM | SYSTOLIC BLOOD PRESSURE: 177 MMHG | BODY MASS INDEX: 25.58 KG/M2 | HEIGHT: 67.09 IN | RESPIRATION RATE: 18 BRPM | WEIGHT: 163 LBS | DIASTOLIC BLOOD PRESSURE: 88 MMHG

## 2019-01-01 VITALS
TEMPERATURE: 98 F | SYSTOLIC BLOOD PRESSURE: 136 MMHG | HEART RATE: 59 BPM | DIASTOLIC BLOOD PRESSURE: 69 MMHG | RESPIRATION RATE: 15 BRPM | OXYGEN SATURATION: 100 %

## 2019-01-01 DIAGNOSIS — C78.02 SECONDARY ADENOCARCINOMA OF LEFT LUNG (HCC): ICD-10-CM

## 2019-01-01 DIAGNOSIS — C77.2 SECONDARY MALIGNANT NEOPLASM OF RETROPERITONEAL LYMPH NODES (HCC): Primary | ICD-10-CM

## 2019-01-01 DIAGNOSIS — C78.7 SECONDARY ADENOCARCINOMA OF LIVER (HCC): ICD-10-CM

## 2019-01-01 DIAGNOSIS — C25.0 MALIGNANT NEOPLASM OF HEAD OF PANCREAS (HCC): ICD-10-CM

## 2019-01-01 DIAGNOSIS — R19.7 DIARRHEA, UNSPECIFIED TYPE: Primary | ICD-10-CM

## 2019-01-01 DIAGNOSIS — G89.3 CANCER RELATED PAIN: ICD-10-CM

## 2019-01-01 DIAGNOSIS — N13.5 OBSTRUCTION OF LEFT URETER: ICD-10-CM

## 2019-01-01 DIAGNOSIS — R35.0 URINARY FREQUENCY: ICD-10-CM

## 2019-01-01 DIAGNOSIS — R11.15 INTRACTABLE CYCLICAL VOMITING WITH NAUSEA: ICD-10-CM

## 2019-01-01 DIAGNOSIS — E86.0 DEHYDRATION: ICD-10-CM

## 2019-01-01 DIAGNOSIS — C77.2 SECONDARY MALIGNANT NEOPLASM OF RETROPERITONEAL LYMPH NODES (HCC): ICD-10-CM

## 2019-01-01 DIAGNOSIS — C25.0 MALIGNANT NEOPLASM OF HEAD OF PANCREAS (HCC): Primary | ICD-10-CM

## 2019-01-01 DIAGNOSIS — R05.9 COUGH: Primary | ICD-10-CM

## 2019-01-01 DIAGNOSIS — R53.83 FATIGUE DUE TO TREATMENT: ICD-10-CM

## 2019-01-01 DIAGNOSIS — K59.00 CONSTIPATION, UNSPECIFIED CONSTIPATION TYPE: ICD-10-CM

## 2019-01-01 DIAGNOSIS — K12.31 MUCOSITIS DUE TO ANTINEOPLASTIC THERAPY: ICD-10-CM

## 2019-01-01 DIAGNOSIS — R35.0 URINARY FREQUENCY: Primary | ICD-10-CM

## 2019-01-01 DIAGNOSIS — N13.39 OTHER HYDRONEPHROSIS: ICD-10-CM

## 2019-01-01 DIAGNOSIS — G89.3 CANCER RELATED PAIN: Primary | ICD-10-CM

## 2019-01-01 DIAGNOSIS — G89.3 PAIN, NEOPLASM-RELATED: ICD-10-CM

## 2019-01-01 DIAGNOSIS — N18.2 STAGE 2 CHRONIC KIDNEY DISEASE: ICD-10-CM

## 2019-01-01 DIAGNOSIS — C25.9 MALIGNANT NEOPLASM OF PANCREAS, UNSPECIFIED LOCATION OF MALIGNANCY (HCC): ICD-10-CM

## 2019-01-01 DIAGNOSIS — N18.2 STAGE 2 CHRONIC KIDNEY DISEASE: Primary | ICD-10-CM

## 2019-01-01 DIAGNOSIS — R27.0 ATAXIA: ICD-10-CM

## 2019-01-01 DIAGNOSIS — D75.9 DRUG-INDUCED CYTOPENIA: ICD-10-CM

## 2019-01-01 DIAGNOSIS — K13.79 MOUTH SORES: ICD-10-CM

## 2019-01-01 DIAGNOSIS — C78.01 SECONDARY CARCINOMA OF RIGHT LUNG (HCC): ICD-10-CM

## 2019-01-01 DIAGNOSIS — E87.6 HYPOKALEMIA: ICD-10-CM

## 2019-01-01 DIAGNOSIS — T50.905A DRUG-INDUCED CYTOPENIA: ICD-10-CM

## 2019-01-01 DIAGNOSIS — C22.9 LIVER CANCER (HCC): ICD-10-CM

## 2019-01-01 DIAGNOSIS — R19.7 DIARRHEA, UNSPECIFIED TYPE: ICD-10-CM

## 2019-01-01 DIAGNOSIS — R11.2 NON-INTRACTABLE VOMITING WITH NAUSEA, UNSPECIFIED VOMITING TYPE: ICD-10-CM

## 2019-01-01 DIAGNOSIS — K12.31 MUCOSITIS DUE TO CHEMOTHERAPY: ICD-10-CM

## 2019-01-01 DIAGNOSIS — N13.30 HYDRONEPHROSIS: ICD-10-CM

## 2019-01-01 DIAGNOSIS — C25.9 MALIGNANT NEOPLASM OF PANCREAS, UNSPECIFIED LOCATION OF MALIGNANCY (HCC): Primary | ICD-10-CM

## 2019-01-01 DIAGNOSIS — R27.0 ATAXIA: Primary | ICD-10-CM

## 2019-01-01 LAB
ALBUMIN SERPL-MCNC: 3.3 G/DL (ref 3.4–5)
ALBUMIN SERPL-MCNC: 3.4 G/DL (ref 3.4–5)
ALBUMIN SERPL-MCNC: 3.6 G/DL (ref 3.4–5)
ALBUMIN/GLOB SERPL: 0.9 {RATIO} (ref 1–2)
ALBUMIN/GLOB SERPL: 1 {RATIO} (ref 1–2)
ALBUMIN/GLOB SERPL: 1 {RATIO} (ref 1–2)
ALBUMIN/GLOB SERPL: 1.1 {RATIO} (ref 1–2)
ALBUMIN/GLOB SERPL: 1.1 {RATIO} (ref 1–2)
ALBUMIN/GLOB SERPL: 1.2 {RATIO} (ref 1–2)
ALP LIVER SERPL-CCNC: 118 U/L (ref 55–142)
ALP LIVER SERPL-CCNC: 125 U/L (ref 55–142)
ALP LIVER SERPL-CCNC: 127 U/L (ref 55–142)
ALP LIVER SERPL-CCNC: 128 U/L (ref 55–142)
ALP LIVER SERPL-CCNC: 147 U/L (ref 55–142)
ALP LIVER SERPL-CCNC: 161 U/L (ref 55–142)
ALP LIVER SERPL-CCNC: 183 U/L (ref 55–142)
ALP LIVER SERPL-CCNC: 207 U/L (ref 55–142)
ALT SERPL-CCNC: 19 U/L (ref 13–56)
ALT SERPL-CCNC: 21 U/L (ref 13–56)
ALT SERPL-CCNC: 23 U/L (ref 13–56)
ALT SERPL-CCNC: 23 U/L (ref 13–56)
ALT SERPL-CCNC: 30 U/L (ref 13–56)
ALT SERPL-CCNC: 43 U/L (ref 13–56)
ALT SERPL-CCNC: 45 U/L (ref 13–56)
ALT SERPL-CCNC: 48 U/L (ref 13–56)
ANION GAP SERPL CALC-SCNC: 5 MMOL/L (ref 0–18)
ANION GAP SERPL CALC-SCNC: 6 MMOL/L (ref 0–18)
ANION GAP SERPL CALC-SCNC: 8 MMOL/L (ref 0–18)
ANION GAP SERPL CALC-SCNC: 9 MMOL/L (ref 0–18)
AST SERPL-CCNC: 16 U/L (ref 15–37)
AST SERPL-CCNC: 18 U/L (ref 15–37)
AST SERPL-CCNC: 18 U/L (ref 15–37)
AST SERPL-CCNC: 19 U/L (ref 15–37)
AST SERPL-CCNC: 22 U/L (ref 15–37)
AST SERPL-CCNC: 22 U/L (ref 15–37)
AST SERPL-CCNC: 23 U/L (ref 15–37)
AST SERPL-CCNC: 27 U/L (ref 15–37)
BASOPHILS # BLD AUTO: 0.04 X10(3) UL (ref 0–0.2)
BASOPHILS # BLD AUTO: 0.07 X10(3) UL (ref 0–0.2)
BASOPHILS # BLD AUTO: 0.07 X10(3) UL (ref 0–0.2)
BASOPHILS # BLD AUTO: 0.09 X10(3) UL (ref 0–0.2)
BASOPHILS # BLD AUTO: 0.1 X10(3) UL (ref 0–0.2)
BASOPHILS NFR BLD AUTO: 0.3 %
BASOPHILS NFR BLD AUTO: 0.7 %
BASOPHILS NFR BLD AUTO: 0.7 %
BASOPHILS NFR BLD AUTO: 0.8 %
BASOPHILS NFR BLD AUTO: 1.1 %
BASOPHILS NFR BLD AUTO: 1.3 %
BASOPHILS NFR BLD AUTO: 1.6 %
BASOPHILS NFR BLD AUTO: 1.7 %
BILIRUB SERPL-MCNC: 0.3 MG/DL (ref 0.1–2)
BILIRUB SERPL-MCNC: 0.5 MG/DL (ref 0.1–2)
BILIRUB SERPL-MCNC: 0.6 MG/DL (ref 0.1–2)
BILIRUB SERPL-MCNC: 0.6 MG/DL (ref 0.1–2)
BUN BLD-MCNC: 20 MG/DL (ref 7–18)
BUN BLD-MCNC: 21 MG/DL (ref 7–18)
BUN BLD-MCNC: 24 MG/DL (ref 7–18)
BUN BLD-MCNC: 24 MG/DL (ref 7–18)
BUN BLD-MCNC: 25 MG/DL (ref 7–18)
BUN BLD-MCNC: 25 MG/DL (ref 7–18)
BUN BLD-MCNC: 26 MG/DL (ref 7–18)
BUN BLD-MCNC: 30 MG/DL (ref 7–18)
BUN/CREAT SERPL: 15.8 (ref 10–20)
BUN/CREAT SERPL: 17.1 (ref 10–20)
BUN/CREAT SERPL: 17.9 (ref 10–20)
BUN/CREAT SERPL: 18 (ref 10–20)
BUN/CREAT SERPL: 20.2 (ref 10–20)
BUN/CREAT SERPL: 21 (ref 10–20)
BUN/CREAT SERPL: 21.3 (ref 10–20)
BUN/CREAT SERPL: 24.8 (ref 10–20)
CALCIUM BLD-MCNC: 8.9 MG/DL (ref 8.5–10.1)
CALCIUM BLD-MCNC: 8.9 MG/DL (ref 8.5–10.1)
CALCIUM BLD-MCNC: 9.2 MG/DL (ref 8.5–10.1)
CALCIUM BLD-MCNC: 9.4 MG/DL (ref 8.5–10.1)
CALCIUM BLD-MCNC: 9.6 MG/DL (ref 8.5–10.1)
CANCER AG19-9 SERPL-ACNC: 425.5 U/ML (ref ?–37)
CANCER AG19-9 SERPL-ACNC: 461.1 U/ML (ref ?–37)
CANCER AG19-9 SERPL-ACNC: 488.5 U/ML (ref ?–37)
CANCER AG19-9 SERPL-ACNC: 646 U/ML (ref ?–37)
CANCER AG19-9 SERPL-ACNC: 777.6 U/ML (ref ?–37)
CANCER AG19-9 SERPL-ACNC: ABNORMAL U/ML (ref ?–37)
CEA SERPL-MCNC: 50.5 NG/ML (ref ?–5)
CEA SERPL-MCNC: 6.6 NG/ML (ref ?–5)
CEA SERPL-MCNC: 7.4 NG/ML (ref ?–5)
CEA SERPL-MCNC: 7.5 NG/ML (ref ?–5)
CEA SERPL-MCNC: 8 NG/ML (ref ?–5)
CEA SERPL-MCNC: 9.5 NG/ML (ref ?–5)
CHLORIDE SERPL-SCNC: 100 MMOL/L (ref 98–107)
CHLORIDE SERPL-SCNC: 100 MMOL/L (ref 98–107)
CHLORIDE SERPL-SCNC: 101 MMOL/L (ref 98–107)
CHLORIDE SERPL-SCNC: 105 MMOL/L (ref 98–112)
CHLORIDE SERPL-SCNC: 107 MMOL/L (ref 98–112)
CHLORIDE SERPL-SCNC: 107 MMOL/L (ref 98–112)
CHLORIDE SERPL-SCNC: 110 MMOL/L (ref 98–112)
CHLORIDE SERPL-SCNC: 111 MMOL/L (ref 98–112)
CO2 SERPL-SCNC: 22 MMOL/L (ref 21–32)
CO2 SERPL-SCNC: 24 MMOL/L (ref 21–32)
CO2 SERPL-SCNC: 24 MMOL/L (ref 21–32)
CO2 SERPL-SCNC: 25 MMOL/L (ref 21–32)
CO2 SERPL-SCNC: 26 MMOL/L (ref 21–32)
CO2 SERPL-SCNC: 27 MMOL/L (ref 21–32)
CREAT BLD-MCNC: 1.17 MG/DL (ref 0.55–1.02)
CREAT BLD-MCNC: 1.17 MG/DL (ref 0.55–1.02)
CREAT BLD-MCNC: 1.19 MG/DL (ref 0.55–1.02)
CREAT BLD-MCNC: 1.19 MG/DL (ref 0.55–1.02)
CREAT BLD-MCNC: 1.21 MG/DL (ref 0.55–1.02)
CREAT BLD-MCNC: 1.22 MG/DL (ref 0.55–1.02)
CREAT BLD-MCNC: 1.33 MG/DL (ref 0.55–1.02)
CREAT BLD-MCNC: 1.58 MG/DL (ref 0.55–1.02)
DEPRECATED RDW RBC AUTO: 40.9 FL (ref 35.1–46.3)
DEPRECATED RDW RBC AUTO: 42.3 FL (ref 35.1–46.3)
DEPRECATED RDW RBC AUTO: 43.2 FL (ref 35.1–46.3)
DEPRECATED RDW RBC AUTO: 47.4 FL (ref 35.1–46.3)
DEPRECATED RDW RBC AUTO: 48.1 FL (ref 35.1–46.3)
DEPRECATED RDW RBC AUTO: 48.2 FL (ref 35.1–46.3)
DEPRECATED RDW RBC AUTO: 51.6 FL (ref 35.1–46.3)
DEPRECATED RDW RBC AUTO: 54.1 FL (ref 35.1–46.3)
EOSINOPHIL # BLD AUTO: 0.08 X10(3) UL (ref 0–0.7)
EOSINOPHIL # BLD AUTO: 0.15 X10(3) UL (ref 0–0.7)
EOSINOPHIL # BLD AUTO: 0.16 X10(3) UL (ref 0–0.7)
EOSINOPHIL # BLD AUTO: 0.16 X10(3) UL (ref 0–0.7)
EOSINOPHIL # BLD AUTO: 0.19 X10(3) UL (ref 0–0.7)
EOSINOPHIL # BLD AUTO: 0.23 X10(3) UL (ref 0–0.7)
EOSINOPHIL # BLD AUTO: 0.27 X10(3) UL (ref 0–0.7)
EOSINOPHIL # BLD AUTO: 0.31 X10(3) UL (ref 0–0.7)
EOSINOPHIL NFR BLD AUTO: 1 %
EOSINOPHIL NFR BLD AUTO: 2.6 %
EOSINOPHIL NFR BLD AUTO: 2.7 %
EOSINOPHIL NFR BLD AUTO: 2.8 %
EOSINOPHIL NFR BLD AUTO: 3 %
EOSINOPHIL NFR BLD AUTO: 4.1 %
EOSINOPHIL NFR BLD AUTO: 4.9 %
EOSINOPHIL NFR BLD AUTO: 5.1 %
ERYTHROCYTE [DISTWIDTH] IN BLOOD BY AUTOMATED COUNT: 12.6 % (ref 11–15)
ERYTHROCYTE [DISTWIDTH] IN BLOOD BY AUTOMATED COUNT: 12.6 % (ref 11–15)
ERYTHROCYTE [DISTWIDTH] IN BLOOD BY AUTOMATED COUNT: 13.1 % (ref 11–15)
ERYTHROCYTE [DISTWIDTH] IN BLOOD BY AUTOMATED COUNT: 13.2 % (ref 11–15)
ERYTHROCYTE [DISTWIDTH] IN BLOOD BY AUTOMATED COUNT: 13.2 % (ref 11–15)
ERYTHROCYTE [DISTWIDTH] IN BLOOD BY AUTOMATED COUNT: 13.4 % (ref 11–15)
ERYTHROCYTE [DISTWIDTH] IN BLOOD BY AUTOMATED COUNT: 13.7 % (ref 11–15)
ERYTHROCYTE [DISTWIDTH] IN BLOOD BY AUTOMATED COUNT: 14.5 % (ref 11–15)
GLOBULIN PLAS-MCNC: 2.9 G/DL (ref 2.8–4.4)
GLOBULIN PLAS-MCNC: 3.1 G/DL (ref 2.8–4.4)
GLOBULIN PLAS-MCNC: 3.2 G/DL (ref 2.8–4.4)
GLOBULIN PLAS-MCNC: 3.3 G/DL (ref 2.8–4.4)
GLOBULIN PLAS-MCNC: 3.5 G/DL (ref 2.8–4.4)
GLOBULIN PLAS-MCNC: 3.6 G/DL (ref 2.8–4.4)
GLOBULIN PLAS-MCNC: 3.7 G/DL (ref 2.8–4.4)
GLOBULIN PLAS-MCNC: 3.8 G/DL (ref 2.8–4.4)
GLUCOSE BLD-MCNC: 86 MG/DL (ref 70–99)
GLUCOSE BLD-MCNC: 86 MG/DL (ref 70–99)
GLUCOSE BLD-MCNC: 87 MG/DL (ref 70–99)
GLUCOSE BLD-MCNC: 87 MG/DL (ref 70–99)
GLUCOSE BLD-MCNC: 88 MG/DL (ref 70–99)
GLUCOSE BLD-MCNC: 90 MG/DL (ref 70–99)
GLUCOSE BLD-MCNC: 91 MG/DL (ref 70–99)
GLUCOSE BLD-MCNC: 95 MG/DL (ref 70–99)
HCT VFR BLD AUTO: 36.7 % (ref 35–48)
HCT VFR BLD AUTO: 37 % (ref 35–48)
HCT VFR BLD AUTO: 37.5 % (ref 35–48)
HCT VFR BLD AUTO: 38.5 % (ref 35–48)
HCT VFR BLD AUTO: 39.3 % (ref 35–48)
HCT VFR BLD AUTO: 39.5 % (ref 35–48)
HCT VFR BLD AUTO: 39.9 % (ref 35–48)
HCT VFR BLD AUTO: 40.5 % (ref 35–48)
HGB BLD-MCNC: 11.9 G/DL (ref 12–16)
HGB BLD-MCNC: 12.5 G/DL (ref 12–16)
HGB BLD-MCNC: 12.6 G/DL (ref 12–16)
HGB BLD-MCNC: 13 G/DL (ref 12–16)
HGB BLD-MCNC: 13.1 G/DL (ref 12–16)
HGB BLD-MCNC: 13.2 G/DL (ref 12–16)
HGB BLD-MCNC: 13.3 G/DL (ref 12–16)
HGB BLD-MCNC: 13.6 G/DL (ref 12–16)
IMM GRANULOCYTES # BLD AUTO: 0.01 X10(3) UL (ref 0–1)
IMM GRANULOCYTES # BLD AUTO: 0.02 X10(3) UL (ref 0–1)
IMM GRANULOCYTES # BLD AUTO: 0.02 X10(3) UL (ref 0–1)
IMM GRANULOCYTES # BLD AUTO: 0.03 X10(3) UL (ref 0–1)
IMM GRANULOCYTES # BLD AUTO: 0.03 X10(3) UL (ref 0–1)
IMM GRANULOCYTES # BLD AUTO: 0.13 X10(3) UL (ref 0–1)
IMM GRANULOCYTES NFR BLD: 0.2 %
IMM GRANULOCYTES NFR BLD: 0.3 %
IMM GRANULOCYTES NFR BLD: 0.4 %
IMM GRANULOCYTES NFR BLD: 0.5 %
IMM GRANULOCYTES NFR BLD: 0.6 %
IMM GRANULOCYTES NFR BLD: 1.1 %
INR CARTRIDGE LOT #: 210
INR: 0.9 (ref 0.8–1.3)
LYMPHOCYTES # BLD AUTO: 1.23 X10(3) UL (ref 1–4)
LYMPHOCYTES # BLD AUTO: 1.26 X10(3) UL (ref 1–4)
LYMPHOCYTES # BLD AUTO: 1.29 X10(3) UL (ref 1–4)
LYMPHOCYTES # BLD AUTO: 1.33 X10(3) UL (ref 1–4)
LYMPHOCYTES # BLD AUTO: 1.6 X10(3) UL (ref 1–4)
LYMPHOCYTES # BLD AUTO: 1.67 X10(3) UL (ref 1–4)
LYMPHOCYTES # BLD AUTO: 1.74 X10(3) UL (ref 1–4)
LYMPHOCYTES # BLD AUTO: 1.83 X10(3) UL (ref 1–4)
LYMPHOCYTES NFR BLD AUTO: 13.3 %
LYMPHOCYTES NFR BLD AUTO: 21.6 %
LYMPHOCYTES NFR BLD AUTO: 21.9 %
LYMPHOCYTES NFR BLD AUTO: 22.9 %
LYMPHOCYTES NFR BLD AUTO: 23.5 %
LYMPHOCYTES NFR BLD AUTO: 23.7 %
LYMPHOCYTES NFR BLD AUTO: 30.5 %
LYMPHOCYTES NFR BLD AUTO: 46.6 %
M PROTEIN MFR SERPL ELPH: 6.3 G/DL (ref 6.4–8.2)
M PROTEIN MFR SERPL ELPH: 6.5 G/DL (ref 6.4–8.2)
M PROTEIN MFR SERPL ELPH: 6.6 G/DL (ref 6.4–8.2)
M PROTEIN MFR SERPL ELPH: 6.6 G/DL (ref 6.4–8.2)
M PROTEIN MFR SERPL ELPH: 6.9 G/DL (ref 6.4–8.2)
M PROTEIN MFR SERPL ELPH: 7.1 G/DL (ref 6.4–8.2)
MCH RBC QN AUTO: 30.8 PG (ref 26–34)
MCH RBC QN AUTO: 30.9 PG (ref 26–34)
MCH RBC QN AUTO: 31.2 PG (ref 26–34)
MCH RBC QN AUTO: 32.4 PG (ref 26–34)
MCH RBC QN AUTO: 32.5 PG (ref 26–34)
MCH RBC QN AUTO: 32.6 PG (ref 26–34)
MCH RBC QN AUTO: 33 PG (ref 26–34)
MCH RBC QN AUTO: 34.1 PG (ref 26–34)
MCHC RBC AUTO-ENTMCNC: 32.4 G/DL (ref 31–37)
MCHC RBC AUTO-ENTMCNC: 32.7 G/DL (ref 31–37)
MCHC RBC AUTO-ENTMCNC: 33.1 G/DL (ref 31–37)
MCHC RBC AUTO-ENTMCNC: 33.2 G/DL (ref 31–37)
MCHC RBC AUTO-ENTMCNC: 33.3 G/DL (ref 31–37)
MCHC RBC AUTO-ENTMCNC: 33.6 G/DL (ref 31–37)
MCHC RBC AUTO-ENTMCNC: 33.8 G/DL (ref 31–37)
MCHC RBC AUTO-ENTMCNC: 35.1 G/DL (ref 31–37)
MCV RBC AUTO: 101.7 FL (ref 80–100)
MCV RBC AUTO: 102.2 FL (ref 80–100)
MCV RBC AUTO: 88.7 FL (ref 80–100)
MCV RBC AUTO: 91.4 FL (ref 80–100)
MCV RBC AUTO: 91.6 FL (ref 80–100)
MCV RBC AUTO: 97.8 FL (ref 80–100)
MCV RBC AUTO: 98 FL (ref 80–100)
MCV RBC AUTO: 99.5 FL (ref 80–100)
MONOCYTES # BLD AUTO: 0.32 X10(3) UL (ref 0.1–1)
MONOCYTES # BLD AUTO: 0.4 X10(3) UL (ref 0.1–1)
MONOCYTES # BLD AUTO: 0.41 X10(3) UL (ref 0.1–1)
MONOCYTES # BLD AUTO: 0.42 X10(3) UL (ref 0.1–1)
MONOCYTES # BLD AUTO: 0.49 X10(3) UL (ref 0.1–1)
MONOCYTES # BLD AUTO: 0.52 X10(3) UL (ref 0.1–1)
MONOCYTES # BLD AUTO: 0.57 X10(3) UL (ref 0.1–1)
MONOCYTES # BLD AUTO: 0.98 X10(3) UL (ref 0.1–1)
MONOCYTES NFR BLD AUTO: 10.7 %
MONOCYTES NFR BLD AUTO: 6 %
MONOCYTES NFR BLD AUTO: 6.8 %
MONOCYTES NFR BLD AUTO: 6.9 %
MONOCYTES NFR BLD AUTO: 7.8 %
MONOCYTES NFR BLD AUTO: 8.2 %
MONOCYTES NFR BLD AUTO: 8.7 %
MONOCYTES NFR BLD AUTO: 9.5 %
NEUTROPHILS # BLD AUTO: 1.35 X10 (3) UL (ref 1.5–7.7)
NEUTROPHILS # BLD AUTO: 1.35 X10(3) UL (ref 1.5–7.7)
NEUTROPHILS # BLD AUTO: 2.91 X10 (3) UL (ref 1.5–7.7)
NEUTROPHILS # BLD AUTO: 2.91 X10(3) UL (ref 1.5–7.7)
NEUTROPHILS # BLD AUTO: 3.47 X10 (3) UL (ref 1.5–7.7)
NEUTROPHILS # BLD AUTO: 3.47 X10(3) UL (ref 1.5–7.7)
NEUTROPHILS # BLD AUTO: 3.51 X10 (3) UL (ref 1.5–7.7)
NEUTROPHILS # BLD AUTO: 3.51 X10(3) UL (ref 1.5–7.7)
NEUTROPHILS # BLD AUTO: 3.56 X10 (3) UL (ref 1.5–7.7)
NEUTROPHILS # BLD AUTO: 3.56 X10(3) UL (ref 1.5–7.7)
NEUTROPHILS # BLD AUTO: 3.97 X10 (3) UL (ref 1.5–7.7)
NEUTROPHILS # BLD AUTO: 3.97 X10(3) UL (ref 1.5–7.7)
NEUTROPHILS # BLD AUTO: 5.79 X10 (3) UL (ref 1.5–7.7)
NEUTROPHILS # BLD AUTO: 5.79 X10(3) UL (ref 1.5–7.7)
NEUTROPHILS # BLD AUTO: 8.95 X10 (3) UL (ref 1.5–7.7)
NEUTROPHILS # BLD AUTO: 8.95 X10(3) UL (ref 1.5–7.7)
NEUTROPHILS NFR BLD AUTO: 36.2 %
NEUTROPHILS NFR BLD AUTO: 53.3 %
NEUTROPHILS NFR BLD AUTO: 62.6 %
NEUTROPHILS NFR BLD AUTO: 64.8 %
NEUTROPHILS NFR BLD AUTO: 66.2 %
NEUTROPHILS NFR BLD AUTO: 66.6 %
NEUTROPHILS NFR BLD AUTO: 69.3 %
NEUTROPHILS NFR BLD AUTO: 74.5 %
OSMOLALITY SERPL CALC.SUM OF ELEC: 282 MOSM/KG (ref 275–295)
OSMOLALITY SERPL CALC.SUM OF ELEC: 282 MOSM/KG (ref 275–295)
OSMOLALITY SERPL CALC.SUM OF ELEC: 283 MOSM/KG (ref 275–295)
OSMOLALITY SERPL CALC.SUM OF ELEC: 284 MOSM/KG (ref 275–295)
OSMOLALITY SERPL CALC.SUM OF ELEC: 290 MOSM/KG (ref 275–295)
OSMOLALITY SERPL CALC.SUM OF ELEC: 297 MOSM/KG (ref 275–295)
OSMOLALITY SERPL CALC.SUM OF ELEC: 298 MOSM/KG (ref 275–295)
OSMOLALITY SERPL CALC.SUM OF ELEC: 298 MOSM/KG (ref 275–295)
PLATELET # BLD AUTO: 227 10(3)UL (ref 150–450)
PLATELET # BLD AUTO: 250 10(3)UL (ref 150–450)
PLATELET # BLD AUTO: 253 10(3)UL (ref 150–450)
PLATELET # BLD AUTO: 266 10(3)UL (ref 150–450)
PLATELET # BLD AUTO: 271 10(3)UL (ref 150–450)
PLATELET # BLD AUTO: 273 10(3)UL (ref 150–450)
PLATELET # BLD AUTO: 282 10(3)UL (ref 150–450)
PLATELET # BLD AUTO: 356 10(3)UL (ref 150–450)
POTASSIUM SERPL-SCNC: 3.4 MMOL/L (ref 3.5–5.1)
POTASSIUM SERPL-SCNC: 3.5 MMOL/L (ref 3.5–5.1)
POTASSIUM SERPL-SCNC: 3.7 MMOL/L (ref 3.5–5.1)
POTASSIUM SERPL-SCNC: 3.7 MMOL/L (ref 3.5–5.1)
POTASSIUM SERPL-SCNC: 3.8 MMOL/L (ref 3.5–5.1)
POTASSIUM SERPL-SCNC: 4 MMOL/L (ref 3.5–5.1)
POTASSIUM SERPL-SCNC: 4.1 MMOL/L (ref 3.5–5.1)
POTASSIUM SERPL-SCNC: 4.4 MMOL/L (ref 3.5–5.1)
RBC # BLD AUTO: 3.61 X10(6)UL (ref 3.8–5.3)
RBC # BLD AUTO: 3.67 X10(6)UL (ref 3.8–5.3)
RBC # BLD AUTO: 3.87 X10(6)UL (ref 3.8–5.3)
RBC # BLD AUTO: 4.03 X10(6)UL (ref 3.8–5.3)
RBC # BLD AUTO: 4.08 X10(6)UL (ref 3.8–5.3)
RBC # BLD AUTO: 4.17 X10(6)UL (ref 3.8–5.3)
RBC # BLD AUTO: 4.3 X10(6)UL (ref 3.8–5.3)
RBC # BLD AUTO: 4.42 X10(6)UL (ref 3.8–5.3)
SODIUM SERPL-SCNC: 134 MMOL/L (ref 136–145)
SODIUM SERPL-SCNC: 135 MMOL/L (ref 136–145)
SODIUM SERPL-SCNC: 139 MMOL/L (ref 136–145)
SODIUM SERPL-SCNC: 141 MMOL/L (ref 136–145)
SODIUM SERPL-SCNC: 142 MMOL/L (ref 136–145)
SODIUM SERPL-SCNC: 142 MMOL/L (ref 136–145)
WBC # BLD AUTO: 12 X10(3) UL (ref 4–11)
WBC # BLD AUTO: 3.7 X10(3) UL (ref 4–11)
WBC # BLD AUTO: 5.4 X10(3) UL (ref 4–11)
WBC # BLD AUTO: 5.4 X10(3) UL (ref 4–11)
WBC # BLD AUTO: 5.5 X10(3) UL (ref 4–11)
WBC # BLD AUTO: 5.6 X10(3) UL (ref 4–11)
WBC # BLD AUTO: 6 X10(3) UL (ref 4–11)
WBC # BLD AUTO: 8.4 X10(3) UL (ref 4–11)

## 2019-01-01 PROCEDURE — 96367 TX/PROPH/DG ADDL SEQ IV INF: CPT

## 2019-01-01 PROCEDURE — 86301 IMMUNOASSAY TUMOR CA 19-9: CPT

## 2019-01-01 PROCEDURE — 96417 CHEMO IV INFUS EACH ADDL SEQ: CPT

## 2019-01-01 PROCEDURE — 80053 COMPREHEN METABOLIC PANEL: CPT

## 2019-01-01 PROCEDURE — 36415 COLL VENOUS BLD VENIPUNCTURE: CPT

## 2019-01-01 PROCEDURE — 96413 CHEMO IV INFUSION 1 HR: CPT

## 2019-01-01 PROCEDURE — 0T777DZ DILATION OF LEFT URETER WITH INTRALUMINAL DEVICE, VIA NATURAL OR ARTIFICIAL OPENING: ICD-10-PCS | Performed by: RADIOLOGY

## 2019-01-01 PROCEDURE — 99214 OFFICE O/P EST MOD 30 MIN: CPT | Performed by: INTERNAL MEDICINE

## 2019-01-01 PROCEDURE — 96372 THER/PROPH/DIAG INJ SC/IM: CPT

## 2019-01-01 PROCEDURE — 85025 COMPLETE CBC W/AUTO DIFF WBC: CPT

## 2019-01-01 PROCEDURE — 96375 TX/PRO/DX INJ NEW DRUG ADDON: CPT

## 2019-01-01 PROCEDURE — 80048 BASIC METABOLIC PNL TOTAL CA: CPT

## 2019-01-01 PROCEDURE — 85610 PROTHROMBIN TIME: CPT

## 2019-01-01 PROCEDURE — G9678 ONCOLOGY CARE MODEL SERVICE: HCPCS | Performed by: INTERNAL MEDICINE

## 2019-01-01 PROCEDURE — 74177 CT ABD & PELVIS W/CONTRAST: CPT | Performed by: INTERNAL MEDICINE

## 2019-01-01 PROCEDURE — 82378 CARCINOEMBRYONIC ANTIGEN: CPT

## 2019-01-01 PROCEDURE — 96415 CHEMO IV INFUSION ADDL HR: CPT

## 2019-01-01 PROCEDURE — 96365 THER/PROPH/DIAG IV INF INIT: CPT

## 2019-01-01 PROCEDURE — A9575 INJ GADOTERATE MEGLUMI 0.1ML: HCPCS | Performed by: INTERNAL MEDICINE

## 2019-01-01 PROCEDURE — 50431 NJX PX NFROSGRM &/URTRGRM: CPT

## 2019-01-01 PROCEDURE — 99215 OFFICE O/P EST HI 40 MIN: CPT | Performed by: NURSE PRACTITIONER

## 2019-01-01 PROCEDURE — 0T25X0Z CHANGE DRAINAGE DEVICE IN KIDNEY, EXTERNAL APPROACH: ICD-10-PCS | Performed by: RADIOLOGY

## 2019-01-01 PROCEDURE — 36593 DECLOT VASCULAR DEVICE: CPT

## 2019-01-01 PROCEDURE — 82565 ASSAY OF CREATININE: CPT

## 2019-01-01 PROCEDURE — 99152 MOD SED SAME PHYS/QHP 5/>YRS: CPT

## 2019-01-01 PROCEDURE — 0TP5X0Z REMOVAL OF DRAINAGE DEVICE FROM KIDNEY, EXTERNAL APPROACH: ICD-10-PCS | Performed by: RADIOLOGY

## 2019-01-01 PROCEDURE — 36591 DRAW BLOOD OFF VENOUS DEVICE: CPT

## 2019-01-01 PROCEDURE — 82378 CARCINOEMBRYONIC ANTIGEN: CPT | Performed by: INTERNAL MEDICINE

## 2019-01-01 PROCEDURE — 50693 PLMT URETERAL STENT PRQ: CPT

## 2019-01-01 PROCEDURE — 96374 THER/PROPH/DIAG INJ IV PUSH: CPT

## 2019-01-01 PROCEDURE — 99215 OFFICE O/P EST HI 40 MIN: CPT | Performed by: CLINICAL NURSE SPECIALIST

## 2019-01-01 PROCEDURE — 71260 CT THORAX DX C+: CPT | Performed by: INTERNAL MEDICINE

## 2019-01-01 PROCEDURE — 70553 MRI BRAIN STEM W/O & W/DYE: CPT | Performed by: INTERNAL MEDICINE

## 2019-01-01 PROCEDURE — 96523 IRRIG DRUG DELIVERY DEVICE: CPT

## 2019-01-01 PROCEDURE — 86301 IMMUNOASSAY TUMOR CA 19-9: CPT | Performed by: INTERNAL MEDICINE

## 2019-01-01 PROCEDURE — 0T9780Z DRAINAGE OF LEFT URETER WITH DRAINAGE DEVICE, VIA NATURAL OR ARTIFICIAL OPENING ENDOSCOPIC: ICD-10-PCS | Performed by: UROLOGY

## 2019-01-01 PROCEDURE — 96366 THER/PROPH/DIAG IV INF ADDON: CPT

## 2019-01-01 PROCEDURE — 81001 URINALYSIS AUTO W/SCOPE: CPT

## 2019-01-01 PROCEDURE — 96361 HYDRATE IV INFUSION ADD-ON: CPT

## 2019-01-01 PROCEDURE — 83735 ASSAY OF MAGNESIUM: CPT

## 2019-01-01 PROCEDURE — 87086 URINE CULTURE/COLONY COUNT: CPT

## 2019-01-01 PROCEDURE — C9113 INJ PANTOPRAZOLE SODIUM, VIA: HCPCS | Performed by: NURSE PRACTITIONER

## 2019-01-01 PROCEDURE — 50389 REMOVE RENAL TUBE W/FLUORO: CPT

## 2019-01-01 PROCEDURE — 99153 MOD SED SAME PHYS/QHP EA: CPT

## 2019-01-01 PROCEDURE — 99214 OFFICE O/P EST MOD 30 MIN: CPT | Performed by: NURSE PRACTITIONER

## 2019-01-01 DEVICE — STENT URET 6F 26CM WO GW INL: Type: IMPLANTABLE DEVICE | Status: FUNCTIONAL

## 2019-01-01 RX ORDER — FLUOROURACIL 50 MG/ML
1500 INJECTION, SOLUTION INTRAVENOUS CONTINUOUS
Status: DISCONTINUED | OUTPATIENT
Start: 2019-01-01 | End: 2019-01-01

## 2019-01-01 RX ORDER — SODIUM CHLORIDE 9 MG/ML
INJECTION, SOLUTION INTRAVENOUS CONTINUOUS
Status: DISCONTINUED | OUTPATIENT
Start: 2019-01-01 | End: 2019-01-01

## 2019-01-01 RX ORDER — FLUOROURACIL 50 MG/ML
1500 INJECTION, SOLUTION INTRAVENOUS CONTINUOUS
Status: CANCELLED | OUTPATIENT
Start: 2019-01-01

## 2019-01-01 RX ORDER — METOCLOPRAMIDE HYDROCHLORIDE 5 MG/ML
10 INJECTION INTRAMUSCULAR; INTRAVENOUS EVERY 6 HOURS PRN
Status: CANCELLED | OUTPATIENT
Start: 2019-01-01

## 2019-01-01 RX ORDER — PROCHLORPERAZINE MALEATE 10 MG
10 TABLET ORAL EVERY 6 HOURS PRN
Status: CANCELLED | OUTPATIENT
Start: 2019-01-01

## 2019-01-01 RX ORDER — NALOXONE HYDROCHLORIDE 0.4 MG/ML
80 INJECTION, SOLUTION INTRAMUSCULAR; INTRAVENOUS; SUBCUTANEOUS AS NEEDED
Status: DISCONTINUED | OUTPATIENT
Start: 2019-01-01 | End: 2019-01-01

## 2019-01-01 RX ORDER — POTASSIUM CHLORIDE 20 MEQ/1
20 TABLET, EXTENDED RELEASE ORAL DAILY
Qty: 30 TABLET | Refills: 0 | Status: SHIPPED | OUTPATIENT
Start: 2019-01-01 | End: 2019-01-01

## 2019-01-01 RX ORDER — ONDANSETRON 2 MG/ML
8 INJECTION INTRAMUSCULAR; INTRAVENOUS EVERY 6 HOURS PRN
Status: CANCELLED | OUTPATIENT
Start: 2019-01-01

## 2019-01-01 RX ORDER — SODIUM CHLORIDE 0.9 % (FLUSH) 0.9 %
10 SYRINGE (ML) INJECTION ONCE
Status: CANCELLED | OUTPATIENT
Start: 2019-01-01

## 2019-01-01 RX ORDER — ATROPINE SULFATE 0.4 MG/ML
0.2 AMPUL (ML) INJECTION ONCE
Status: COMPLETED | OUTPATIENT
Start: 2019-01-01 | End: 2019-01-01

## 2019-01-01 RX ORDER — HYDROMORPHONE HYDROCHLORIDE 1 MG/ML
0.5 INJECTION, SOLUTION INTRAMUSCULAR; INTRAVENOUS; SUBCUTANEOUS ONCE
Status: COMPLETED | OUTPATIENT
Start: 2019-01-01 | End: 2019-01-01

## 2019-01-01 RX ORDER — POTASSIUM CHLORIDE 750 MG/1
20 TABLET, EXTENDED RELEASE ORAL ONCE
Status: CANCELLED
Start: 2019-01-01

## 2019-01-01 RX ORDER — LORAZEPAM 2 MG/ML
INJECTION INTRAMUSCULAR EVERY 4 HOURS PRN
Status: CANCELLED | OUTPATIENT
Start: 2019-01-01

## 2019-01-01 RX ORDER — ATROPINE SULFATE 0.4 MG/ML
0.2 AMPUL (ML) INJECTION ONCE
Status: CANCELLED | OUTPATIENT
Start: 2019-01-01

## 2019-01-01 RX ORDER — HYDROCODONE BITARTRATE AND ACETAMINOPHEN 5; 325 MG/1; MG/1
1 TABLET ORAL EVERY 6 HOURS PRN
Qty: 10 TABLET | Refills: 0 | Status: SHIPPED | OUTPATIENT
Start: 2019-01-01 | End: 2019-01-01

## 2019-01-01 RX ORDER — CEFAZOLIN SODIUM/WATER 2 G/20 ML
2 SYRINGE (ML) INTRAVENOUS ONCE
Status: COMPLETED | OUTPATIENT
Start: 2019-01-01 | End: 2019-01-01

## 2019-01-01 RX ORDER — LORAZEPAM 0.5 MG/1
TABLET ORAL EVERY 4 HOURS PRN
Status: CANCELLED | OUTPATIENT
Start: 2019-01-01

## 2019-01-01 RX ORDER — HYDROCODONE BITARTRATE AND ACETAMINOPHEN 5; 325 MG/1; MG/1
1-2 TABLET ORAL EVERY 4 HOURS PRN
Qty: 60 TABLET | Refills: 0 | Status: SHIPPED | OUTPATIENT
Start: 2019-01-01 | End: 2019-01-01

## 2019-01-01 RX ORDER — AMOXICILLIN 500 MG/1
TABLET, FILM COATED ORAL
Qty: 4 TABLET | Refills: 3 | Status: ON HOLD | OUTPATIENT
Start: 2019-01-01 | End: 2020-01-01

## 2019-01-01 RX ORDER — SODIUM CHLORIDE 9 MG/ML
INJECTION, SOLUTION INTRAVENOUS CONTINUOUS
Status: CANCELLED
Start: 2019-01-01

## 2019-01-01 RX ORDER — SODIUM CHLORIDE 0.9 % (FLUSH) 0.9 %
10 SYRINGE (ML) INJECTION ONCE
Status: COMPLETED | OUTPATIENT
Start: 2019-01-01 | End: 2019-01-01

## 2019-01-01 RX ORDER — LIDOCAINE HYDROCHLORIDE 10 MG/ML
INJECTION, SOLUTION INFILTRATION; PERINEURAL
Status: COMPLETED
Start: 2019-01-01 | End: 2019-01-01

## 2019-01-01 RX ORDER — SODIUM CHLORIDE, SODIUM LACTATE, POTASSIUM CHLORIDE, CALCIUM CHLORIDE 600; 310; 30; 20 MG/100ML; MG/100ML; MG/100ML; MG/100ML
INJECTION, SOLUTION INTRAVENOUS CONTINUOUS
Status: DISCONTINUED | OUTPATIENT
Start: 2019-01-01 | End: 2019-01-01

## 2019-01-01 RX ORDER — ONDANSETRON 2 MG/ML
4 INJECTION INTRAMUSCULAR; INTRAVENOUS AS NEEDED
Status: DISCONTINUED | OUTPATIENT
Start: 2019-01-01 | End: 2019-01-01

## 2019-01-01 RX ORDER — ACETAMINOPHEN 500 MG
1000 TABLET ORAL ONCE AS NEEDED
Status: DISCONTINUED | OUTPATIENT
Start: 2019-01-01 | End: 2019-01-01

## 2019-01-01 RX ORDER — ATROPINE SULFATE 0.4 MG/ML
0.2 AMPUL (ML) INJECTION ONCE
Status: DISCONTINUED | OUTPATIENT
Start: 2019-01-01 | End: 2019-01-01

## 2019-01-01 RX ORDER — HYDROMORPHONE HYDROCHLORIDE 2 MG/1
TABLET ORAL EVERY 4 HOURS PRN
Qty: 30 TABLET | Refills: 0 | Status: SHIPPED | OUTPATIENT
Start: 2019-01-01 | End: 2019-01-01

## 2019-01-01 RX ORDER — ONDANSETRON HYDROCHLORIDE 8 MG/1
8 TABLET, FILM COATED ORAL EVERY 8 HOURS PRN
Qty: 30 TABLET | Refills: 3 | Status: SHIPPED | OUTPATIENT
Start: 2019-01-01 | End: 2019-01-01

## 2019-01-01 RX ORDER — ATROPINE SULFATE 0.4 MG/ML
0.2 AMPUL (ML) INJECTION ONCE
Status: DISCONTINUED | OUTPATIENT
Start: 2019-01-01 | End: 2019-01-01 | Stop reason: SDUPTHER

## 2019-01-01 RX ORDER — MIDAZOLAM HYDROCHLORIDE 1 MG/ML
INJECTION INTRAMUSCULAR; INTRAVENOUS
Status: COMPLETED
Start: 2019-01-01 | End: 2019-01-01

## 2019-01-01 RX ORDER — SODIUM CHLORIDE 0.9 % (FLUSH) 0.9 %
10 SYRINGE (ML) INJECTION ONCE
Status: DISCONTINUED | OUTPATIENT
Start: 2019-01-01 | End: 2019-01-01

## 2019-01-01 RX ORDER — HYDROMORPHONE HYDROCHLORIDE 1 MG/ML
0.4 INJECTION, SOLUTION INTRAMUSCULAR; INTRAVENOUS; SUBCUTANEOUS EVERY 5 MIN PRN
Status: DISCONTINUED | OUTPATIENT
Start: 2019-01-01 | End: 2019-01-01

## 2019-01-01 RX ORDER — SODIUM CHLORIDE 0.9 % (FLUSH) 0.9 %
SYRINGE (ML) INJECTION
Refills: 5 | COMMUNITY
Start: 2019-01-01 | End: 2019-01-01

## 2019-01-01 RX ORDER — PROCHLORPERAZINE MALEATE 10 MG
10 TABLET ORAL EVERY 6 HOURS PRN
Qty: 30 TABLET | Refills: 3 | Status: SHIPPED | OUTPATIENT
Start: 2019-01-01 | End: 2019-01-01

## 2019-01-01 RX ORDER — POTASSIUM CHLORIDE 750 MG/1
20 TABLET, FILM COATED, EXTENDED RELEASE ORAL 2 TIMES DAILY
Qty: 60 TABLET | Refills: 5 | Status: ON HOLD | OUTPATIENT
Start: 2019-01-01 | End: 2020-01-01

## 2019-01-01 RX ORDER — HYDROCODONE BITARTRATE AND ACETAMINOPHEN 10; 325 MG/1; MG/1
1-2 TABLET ORAL EVERY 4 HOURS PRN
Qty: 240 TABLET | Refills: 0 | Status: SHIPPED | OUTPATIENT
Start: 2019-01-01 | End: 2020-01-01

## 2019-01-01 RX ORDER — ACETAMINOPHEN 500 MG
1000 TABLET ORAL ONCE
Status: DISCONTINUED | OUTPATIENT
Start: 2019-01-01 | End: 2019-01-01

## 2019-01-01 RX ORDER — AMOXICILLIN 500 MG/1
CAPSULE ORAL
Qty: 4 CAPSULE | Refills: 0 | Status: SHIPPED | OUTPATIENT
Start: 2019-01-01 | End: 2019-01-01

## 2019-01-01 RX ORDER — HYDROCODONE BITARTRATE AND ACETAMINOPHEN 5; 325 MG/1; MG/1
2 TABLET ORAL AS NEEDED
Status: DISCONTINUED | OUTPATIENT
Start: 2019-01-01 | End: 2019-01-01

## 2019-01-01 RX ORDER — FAMOTIDINE 10 MG
TABLET ORAL 2 TIMES DAILY
COMMUNITY

## 2019-01-01 RX ORDER — CEPHALEXIN 500 MG/1
500 CAPSULE ORAL 2 TIMES DAILY
Qty: 10 CAPSULE | Refills: 0 | Status: SHIPPED | OUTPATIENT
Start: 2019-01-01 | End: 2019-01-01 | Stop reason: ALTCHOICE

## 2019-01-01 RX ORDER — ACETAMINOPHEN 325 MG/1
650 TABLET ORAL EVERY 6 HOURS PRN
Status: DISCONTINUED | OUTPATIENT
Start: 2019-01-01 | End: 2019-01-01

## 2019-01-01 RX ORDER — LEVOFLOXACIN 5 MG/ML
INJECTION, SOLUTION INTRAVENOUS
Status: COMPLETED
Start: 2019-01-01 | End: 2019-01-01

## 2019-01-01 RX ORDER — HYDROCODONE BITARTRATE AND ACETAMINOPHEN 5; 325 MG/1; MG/1
1 TABLET ORAL AS NEEDED
Status: DISCONTINUED | OUTPATIENT
Start: 2019-01-01 | End: 2019-01-01

## 2019-01-01 RX ORDER — ONDANSETRON HYDROCHLORIDE 8 MG/1
8 TABLET, FILM COATED ORAL EVERY 8 HOURS PRN
Qty: 30 TABLET | Refills: 3 | Status: SHIPPED | OUTPATIENT
Start: 2019-01-01 | End: 2020-01-01

## 2019-01-01 RX ORDER — FENTANYL 12 UG/H
1 PATCH TRANSDERMAL
Qty: 10 PATCH | Refills: 0 | Status: SHIPPED | OUTPATIENT
Start: 2019-01-01 | End: 2019-01-01

## 2019-01-01 RX ORDER — PANTOPRAZOLE SODIUM 40 MG/1
40 TABLET, DELAYED RELEASE ORAL DAILY
Qty: 30 TABLET | Refills: 0 | Status: SHIPPED | OUTPATIENT
Start: 2019-01-01 | End: 2019-01-01 | Stop reason: ALTCHOICE

## 2019-01-01 RX ORDER — ACETAMINOPHEN 500 MG
1000 TABLET ORAL ONCE
COMMUNITY
End: 2019-01-01

## 2019-01-01 RX ORDER — TRAMADOL HYDROCHLORIDE 50 MG/1
TABLET ORAL
Refills: 1 | COMMUNITY
Start: 2019-01-01 | End: 2020-01-01 | Stop reason: ALTCHOICE

## 2019-01-01 RX ORDER — AMOXICILLIN 500 MG/1
CAPSULE ORAL
Refills: 0 | COMMUNITY
Start: 2019-01-01 | End: 2019-01-01

## 2019-01-01 RX ORDER — SUCRALFATE ORAL 1 G/10ML
1 SUSPENSION ORAL
Qty: 750 ML | Refills: 0 | Status: SHIPPED | OUTPATIENT
Start: 2019-01-01 | End: 2019-01-01

## 2019-01-01 RX ORDER — PHENAZOPYRIDINE HYDROCHLORIDE 100 MG/1
100 TABLET, FILM COATED ORAL 3 TIMES DAILY PRN
Qty: 15 TABLET | Refills: 0 | Status: SHIPPED | OUTPATIENT
Start: 2019-01-01 | End: 2019-01-01

## 2019-01-01 RX ORDER — BENZONATATE 100 MG/1
100 CAPSULE ORAL 3 TIMES DAILY PRN
Qty: 30 CAPSULE | Refills: 2 | Status: SHIPPED | OUTPATIENT
Start: 2019-01-01 | End: 2019-01-01 | Stop reason: CLARIF

## 2019-01-01 RX ORDER — PROCHLORPERAZINE MALEATE 10 MG
10 TABLET ORAL EVERY 6 HOURS PRN
Qty: 30 TABLET | Refills: 3 | Status: SHIPPED | OUTPATIENT
Start: 2019-01-01 | End: 2020-01-01

## 2019-01-01 RX ORDER — POTASSIUM CHLORIDE 750 MG/1
20 TABLET, EXTENDED RELEASE ORAL ONCE
Status: COMPLETED | OUTPATIENT
Start: 2019-01-01 | End: 2019-01-01

## 2019-01-01 RX ORDER — HYDROMORPHONE HYDROCHLORIDE 2 MG/1
TABLET ORAL EVERY 4 HOURS PRN
Qty: 120 TABLET | Refills: 0 | Status: SHIPPED | OUTPATIENT
Start: 2019-01-01 | End: 2020-01-01

## 2019-01-01 RX ORDER — LIDOCAINE HYDROCHLORIDE 20 MG/ML
JELLY TOPICAL AS NEEDED
Status: DISCONTINUED | OUTPATIENT
Start: 2019-01-01 | End: 2019-01-01 | Stop reason: HOSPADM

## 2019-01-01 RX ORDER — POTASSIUM CHLORIDE 20 MEQ/1
20 TABLET, EXTENDED RELEASE ORAL ONCE
Status: COMPLETED | OUTPATIENT
Start: 2019-01-01 | End: 2019-01-01

## 2019-01-01 RX ADMIN — FLUOROURACIL 2900 MG: 50 INJECTION, SOLUTION INTRAVENOUS at 13:48:00

## 2019-01-01 RX ADMIN — ATROPINE SULFATE 0.2 MG: 0.4 MG/ML AMPUL (ML) INJECTION at 10:37:00

## 2019-01-01 RX ADMIN — FLUOROURACIL 2900 MG: 50 INJECTION, SOLUTION INTRAVENOUS at 13:29:00

## 2019-01-01 RX ADMIN — SODIUM CHLORIDE 0.9 % (FLUSH) 10 ML: 0.9 % SYRINGE (ML) INJECTION at 13:35:00

## 2019-01-01 RX ADMIN — ATROPINE SULFATE 0.2 MG: 0.4 MG/ML AMPUL (ML) INJECTION at 12:40:00

## 2019-01-01 RX ADMIN — ATROPINE SULFATE 0.2 MG: 0.4 MG/ML AMPUL (ML) INJECTION at 10:13:00

## 2019-01-01 RX ADMIN — ATROPINE SULFATE 0.2 MG: 0.4 MG/ML AMPUL (ML) INJECTION at 11:30:00

## 2019-01-01 RX ADMIN — HYDROMORPHONE HYDROCHLORIDE 0.5 MG: 1 INJECTION, SOLUTION INTRAMUSCULAR; INTRAVENOUS; SUBCUTANEOUS at 12:16:00

## 2019-01-01 RX ADMIN — ATROPINE SULFATE 0.2 MG: 0.4 MG/ML AMPUL (ML) INJECTION at 10:57:00

## 2019-01-01 RX ADMIN — SODIUM CHLORIDE 0.9 % (FLUSH) 10 ML: 0.9 % SYRINGE (ML) INJECTION at 10:42:00

## 2019-01-01 RX ADMIN — SODIUM CHLORIDE 0.9 % (FLUSH) 10 ML: 0.9 % SYRINGE (ML) INJECTION at 11:30:00

## 2019-01-01 RX ADMIN — HYDROMORPHONE HYDROCHLORIDE 0.5 MG: 1 INJECTION, SOLUTION INTRAMUSCULAR; INTRAVENOUS; SUBCUTANEOUS at 12:00:00

## 2019-01-01 RX ADMIN — FLUOROURACIL 2900 MG: 50 INJECTION, SOLUTION INTRAVENOUS at 15:26:00

## 2019-01-01 RX ADMIN — ATROPINE SULFATE 0.2 MG: 0.4 MG/ML AMPUL (ML) INJECTION at 10:49:00

## 2019-01-01 RX ADMIN — SODIUM CHLORIDE 0.9 % (FLUSH) 10 ML: 0.9 % SYRINGE (ML) INJECTION at 10:15:00

## 2019-01-01 RX ADMIN — FLUOROURACIL 2900 MG: 50 INJECTION, SOLUTION INTRAVENOUS at 12:48:00

## 2019-01-01 RX ADMIN — FLUOROURACIL 2900 MG: 50 INJECTION, SOLUTION INTRAVENOUS at 13:11:00

## 2019-01-01 RX ADMIN — FLUOROURACIL 2900 MG: 50 INJECTION, SOLUTION INTRAVENOUS at 15:17:00

## 2019-01-01 RX ADMIN — FLUOROURACIL 2900 MG: 50 INJECTION, SOLUTION INTRAVENOUS at 13:42:00

## 2019-01-01 RX ADMIN — SODIUM CHLORIDE: 9 INJECTION, SOLUTION INTRAVENOUS at 08:45:00

## 2019-01-01 RX ADMIN — FLUOROURACIL 2900 MG: 50 INJECTION, SOLUTION INTRAVENOUS at 12:36:00

## 2019-01-01 RX ADMIN — POTASSIUM CHLORIDE 20 MEQ: 750 TABLET, EXTENDED RELEASE ORAL at 15:23:00

## 2019-01-01 RX ADMIN — ATROPINE SULFATE 0.2 MG: 0.4 MG/ML AMPUL (ML) INJECTION at 10:58:00

## 2019-01-01 RX ADMIN — FLUOROURACIL 2900 MG: 50 INJECTION, SOLUTION INTRAVENOUS at 13:34:00

## 2019-01-01 RX ADMIN — ATROPINE SULFATE 0.2 MG: 0.4 MG/ML AMPUL (ML) INJECTION at 10:45:00

## 2019-01-01 RX ADMIN — FLUOROURACIL 2900 MG: 50 INJECTION, SOLUTION INTRAVENOUS at 13:21:00

## 2019-01-01 RX ADMIN — SODIUM CHLORIDE: 9 INJECTION, SOLUTION INTRAVENOUS at 10:19:00

## 2019-01-01 RX ADMIN — POTASSIUM CHLORIDE 20 MEQ: 20 TABLET, EXTENDED RELEASE ORAL at 14:50:00

## 2019-01-01 RX ADMIN — FLUOROURACIL 2900 MG: 50 INJECTION, SOLUTION INTRAVENOUS at 14:17:00

## 2019-01-01 RX ADMIN — ATROPINE SULFATE 0.2 MG: 0.4 MG/ML AMPUL (ML) INJECTION at 10:34:00

## 2019-01-01 RX ADMIN — SODIUM CHLORIDE 0.9 % (FLUSH) 10 ML: 0.9 % SYRINGE (ML) INJECTION at 15:40:00

## 2019-01-01 RX ADMIN — SODIUM CHLORIDE: 9 INJECTION, SOLUTION INTRAVENOUS at 07:30:00

## 2019-01-01 RX ADMIN — ATROPINE SULFATE 0.2 MG: 0.4 MG/ML AMPUL (ML) INJECTION at 11:41:00

## 2019-01-01 RX ADMIN — SODIUM CHLORIDE 0.9 % (FLUSH) 10 ML: 0.9 % SYRINGE (ML) INJECTION at 13:45:00

## 2019-01-01 RX ADMIN — SODIUM CHLORIDE 0.9 % (FLUSH) 10 ML: 0.9 % SYRINGE (ML) INJECTION at 11:50:00

## 2019-01-01 RX ADMIN — FLUOROURACIL 2900 MG: 50 INJECTION, SOLUTION INTRAVENOUS at 13:15:00

## 2019-01-01 RX ADMIN — SODIUM CHLORIDE 0.9 % (FLUSH) 10 ML: 0.9 % SYRINGE (ML) INJECTION at 12:15:00

## 2019-01-01 RX ADMIN — FLUOROURACIL 2900 MG: 50 INJECTION, SOLUTION INTRAVENOUS at 13:31:00

## 2019-01-01 RX ADMIN — SODIUM CHLORIDE 0.9 % (FLUSH) 10 ML: 0.9 % SYRINGE (ML) INJECTION at 14:30:00

## 2019-01-01 RX ADMIN — SODIUM CHLORIDE 0.9 % (FLUSH) 10 ML: 0.9 % SYRINGE (ML) INJECTION at 10:04:00

## 2019-01-01 RX ADMIN — SODIUM CHLORIDE 0.9 % (FLUSH) 10 ML: 0.9 % SYRINGE (ML) INJECTION at 10:45:00

## 2019-01-01 RX ADMIN — ATROPINE SULFATE 0.2 MG: 0.4 MG/ML AMPUL (ML) INJECTION at 10:47:00

## 2019-01-01 RX ADMIN — ATROPINE SULFATE 0.2 MG: 0.4 MG/ML AMPUL (ML) INJECTION at 11:50:00

## 2019-01-25 ENCOUNTER — NURSE ONLY (OUTPATIENT)
Dept: HEMATOLOGY/ONCOLOGY | Facility: HOSPITAL | Age: 69
End: 2019-01-25
Attending: INTERNAL MEDICINE
Payer: MEDICARE

## 2019-01-25 VITALS
DIASTOLIC BLOOD PRESSURE: 97 MMHG | HEART RATE: 71 BPM | BODY MASS INDEX: 29.86 KG/M2 | HEIGHT: 66.18 IN | SYSTOLIC BLOOD PRESSURE: 164 MMHG | OXYGEN SATURATION: 97 % | WEIGHT: 185.81 LBS | TEMPERATURE: 98 F | RESPIRATION RATE: 18 BRPM

## 2019-01-25 DIAGNOSIS — C77.2 SECONDARY MALIGNANT NEOPLASM OF RETROPERITONEAL LYMPH NODES (HCC): Primary | ICD-10-CM

## 2019-01-25 DIAGNOSIS — C25.0 MALIGNANT NEOPLASM OF HEAD OF PANCREAS (HCC): ICD-10-CM

## 2019-01-25 DIAGNOSIS — R11.15 INTRACTABLE CYCLICAL VOMITING WITH NAUSEA: Primary | ICD-10-CM

## 2019-01-25 DIAGNOSIS — C78.7 SECONDARY ADENOCARCINOMA OF LIVER (HCC): ICD-10-CM

## 2019-01-25 LAB
ALBUMIN SERPL-MCNC: 3.6 G/DL (ref 3.1–4.5)
ALBUMIN/GLOB SERPL: 1 {RATIO} (ref 1–2)
ALP LIVER SERPL-CCNC: 120 U/L (ref 55–142)
ALT SERPL-CCNC: 27 U/L (ref 14–54)
ANION GAP SERPL CALC-SCNC: 9 MMOL/L (ref 0–18)
AST SERPL-CCNC: 24 U/L (ref 15–41)
BASOPHILS # BLD AUTO: 0.02 X10(3) UL (ref 0–0.1)
BASOPHILS NFR BLD AUTO: 0.5 %
BILIRUB SERPL-MCNC: 0.5 MG/DL (ref 0.1–2)
BUN BLD-MCNC: 20 MG/DL (ref 8–20)
BUN/CREAT SERPL: 15.6 (ref 10–20)
CALCIUM BLD-MCNC: 9.5 MG/DL (ref 8.3–10.3)
CANCER AG19-9 SERPL-ACNC: 3371.9 U/ML (ref ?–37)
CEA SERPL-MCNC: 8.5 NG/ML (ref 0.5–5)
CHLORIDE SERPL-SCNC: 106 MMOL/L (ref 101–111)
CO2 SERPL-SCNC: 25 MMOL/L (ref 22–32)
CREAT BLD-MCNC: 1.28 MG/DL (ref 0.55–1.02)
EOSINOPHIL # BLD AUTO: 0.21 X10(3) UL (ref 0–0.3)
EOSINOPHIL NFR BLD AUTO: 5 %
ERYTHROCYTE [DISTWIDTH] IN BLOOD BY AUTOMATED COUNT: 13.2 % (ref 11.5–16)
GLOBULIN PLAS-MCNC: 3.6 G/DL (ref 2.8–4.4)
GLUCOSE BLD-MCNC: 90 MG/DL (ref 70–99)
HCT VFR BLD AUTO: 41.8 % (ref 34–50)
HGB BLD-MCNC: 14.2 G/DL (ref 12–16)
IMMATURE GRANULOCYTE COUNT: 0 X10(3) UL (ref 0–1)
IMMATURE GRANULOCYTE RATIO %: 0 %
LDH: 227 U/L (ref 84–246)
LYMPHOCYTES # BLD AUTO: 1.48 X10(3) UL (ref 0.9–4)
LYMPHOCYTES NFR BLD AUTO: 34.9 %
M PROTEIN MFR SERPL ELPH: 7.2 G/DL (ref 6.4–8.2)
MCH RBC QN AUTO: 31.4 PG (ref 27–33.2)
MCHC RBC AUTO-ENTMCNC: 34 G/DL (ref 31–37)
MCV RBC AUTO: 92.5 FL (ref 81–100)
MONOCYTES # BLD AUTO: 0.34 X10(3) UL (ref 0.1–1)
MONOCYTES NFR BLD AUTO: 8 %
NEUTROPHIL ABS PRELIM: 2.19 X10 (3) UL (ref 1.3–6.7)
NEUTROPHILS # BLD AUTO: 2.19 X10(3) UL (ref 1.3–6.7)
NEUTROPHILS NFR BLD AUTO: 51.6 %
OSMOLALITY SERPL CALC.SUM OF ELEC: 292 MOSM/KG (ref 275–295)
PLATELET # BLD AUTO: 227 10(3)UL (ref 150–450)
POTASSIUM SERPL-SCNC: 4.6 MMOL/L (ref 3.6–5.1)
RBC # BLD AUTO: 4.52 X10(6)UL (ref 3.8–5.1)
RED CELL DISTRIBUTION WIDTH-SD: 44.5 FL (ref 35.1–46.3)
SODIUM SERPL-SCNC: 140 MMOL/L (ref 136–144)
WBC # BLD AUTO: 4.2 X10(3) UL (ref 4–13)

## 2019-01-25 PROCEDURE — 99214 OFFICE O/P EST MOD 30 MIN: CPT | Performed by: INTERNAL MEDICINE

## 2019-01-25 PROCEDURE — 96523 IRRIG DRUG DELIVERY DEVICE: CPT

## 2019-01-25 RX ORDER — SODIUM CHLORIDE 0.9 % (FLUSH) 0.9 %
10 SYRINGE (ML) INJECTION ONCE
Status: COMPLETED | OUTPATIENT
Start: 2019-01-25 | End: 2019-01-25

## 2019-01-25 RX ORDER — SODIUM CHLORIDE 0.9 % (FLUSH) 0.9 %
10 SYRINGE (ML) INJECTION ONCE
Status: CANCELLED | OUTPATIENT
Start: 2019-01-25

## 2019-01-25 RX ADMIN — SODIUM CHLORIDE 0.9 % (FLUSH) 10 ML: 0.9 % SYRINGE (ML) INJECTION at 08:45:00

## 2019-01-26 NOTE — PROGRESS NOTES
[facility]    PATIENT'S NAME: Mervin Emely   ATTENDING PHYSICIAN: Kit Wall M.D.    PATIENT ACCOUNT #: [de-identified] LOCATION: [address] [place] [address] RECORD #: [id number] YOB: 1950   DATE OF SERVICE: 01/25/2019       TANESHA Specifically, there was no BRCA mutation. Given her excellent responses to treatment, we thought that perhaps this was the case. She has never had [employer] sequencing done on her tumor as the volume of the biopsy material she has had has been quite small. Metastatic pancreatic cancer. I have asked her to get a followup CT scan done. We could certainly look at the areas that were evident on the PET scan with a CT. I am asking her to see me no later than March 1 and to do the CT scan prior to the visit.   Heather Skinner

## 2019-02-05 ENCOUNTER — HOSPITAL ENCOUNTER (OUTPATIENT)
Dept: MRI IMAGING | Age: 69
Discharge: HOME | End: 2019-02-05
Attending: INTERNAL MEDICINE
Payer: MEDICARE

## 2019-02-05 ENCOUNTER — TELEPHONE (OUTPATIENT)
Dept: HEMATOLOGY/ONCOLOGY | Facility: HOSPITAL | Age: 69
End: 2019-02-05

## 2019-02-05 DIAGNOSIS — C25.0 MALIGNANT NEOPLASM OF HEAD OF PANCREAS (HCC): ICD-10-CM

## 2019-02-05 DIAGNOSIS — M54.50 CHRONIC LOWER BACK PAIN: ICD-10-CM

## 2019-02-05 DIAGNOSIS — C25.0 MALIGNANT NEOPLASM OF HEAD OF PANCREAS (HCC): Primary | ICD-10-CM

## 2019-02-05 DIAGNOSIS — G89.29 CHRONIC LOWER BACK PAIN: ICD-10-CM

## 2019-02-05 PROCEDURE — 72158 MRI LUMBAR SPINE W/O & W/DYE: CPT | Performed by: INTERNAL MEDICINE

## 2019-02-05 PROCEDURE — A9575 INJ GADOTERATE MEGLUMI 0.1ML: HCPCS | Performed by: INTERNAL MEDICINE

## 2019-02-05 NOTE — TELEPHONE ENCOUNTER
Patient c/o chronic lower back pain. Patient has been doing physical therapy but it doesn't seem to be helping. Pain wakes patient up at night. Taking Tylenol with little relief. Per Dr Arielle Bertrand, patient to get a MRI of lumbar spine. Order placed.  Patient wi

## 2019-02-06 ENCOUNTER — TELEPHONE (OUTPATIENT)
Dept: HEMATOLOGY/ONCOLOGY | Facility: HOSPITAL | Age: 69
End: 2019-02-06

## 2019-02-06 NOTE — TELEPHONE ENCOUNTER
Spoke to patient about the MRI - no bony lesions or risks to cord or nerve roots. Pain may be from the nodes that are anterior to the spine - she is finding relief from just advil. Continues PT.   If worsens will offer tramadol and if persistently and con

## 2019-02-19 ENCOUNTER — TELEPHONE (OUTPATIENT)
Dept: HEMATOLOGY/ONCOLOGY | Facility: HOSPITAL | Age: 69
End: 2019-02-19

## 2019-02-19 DIAGNOSIS — C78.7 SECONDARY ADENOCARCINOMA OF LIVER (HCC): ICD-10-CM

## 2019-02-19 DIAGNOSIS — C77.2 SECONDARY MALIGNANT NEOPLASM OF RETROPERITONEAL LYMPH NODES (HCC): Primary | ICD-10-CM

## 2019-02-19 DIAGNOSIS — C25.0 MALIGNANT NEOPLASM OF HEAD OF PANCREAS (HCC): ICD-10-CM

## 2019-02-19 RX ORDER — ONDANSETRON HYDROCHLORIDE 8 MG/1
8 TABLET, FILM COATED ORAL EVERY 8 HOURS PRN
Qty: 30 TABLET | Refills: 3 | Status: SHIPPED | OUTPATIENT
Start: 2019-02-19 | End: 2019-01-01

## 2019-02-19 RX ORDER — PROCHLORPERAZINE MALEATE 10 MG
10 TABLET ORAL EVERY 6 HOURS PRN
Qty: 30 TABLET | Refills: 3 | Status: SHIPPED | OUTPATIENT
Start: 2019-02-19 | End: 2019-01-01

## 2019-02-19 NOTE — TELEPHONE ENCOUNTER
Received call from Dr Evelnie Garcia, patient presented with right flank pain and hematuria.  Spiral CT completed at [facility]:   IMPRESSION:  1. Ill-defined soft tissue fullness of the pancreatic head may reflect the primary pancreatic malignancy, suboptimally assesse

## 2019-02-26 ENCOUNTER — TELEPHONE (OUTPATIENT)
Dept: HEMATOLOGY/ONCOLOGY | Facility: HOSPITAL | Age: 69
End: 2019-02-26

## 2019-02-26 NOTE — TELEPHONE ENCOUNTER
Spoke to patient. She had flank pain and hematuria last week. Had CT at [facility] which showed disease progression. Had attempt at stent with Dr Sharyle Ripple - unsuccessful. Getting left sided nephrostomy tomorrow.   Plan to start chemo with liposomal irinoteca

## 2019-02-28 DIAGNOSIS — C25.0 MALIGNANT NEOPLASM OF HEAD OF PANCREAS (HCC): Primary | ICD-10-CM

## 2019-03-01 ENCOUNTER — OFFICE VISIT (OUTPATIENT)
Dept: HEMATOLOGY/ONCOLOGY | Facility: HOSPITAL | Age: 69
End: 2019-03-01
Attending: INTERNAL MEDICINE
Payer: MEDICARE

## 2019-03-01 VITALS
DIASTOLIC BLOOD PRESSURE: 84 MMHG | SYSTOLIC BLOOD PRESSURE: 125 MMHG | OXYGEN SATURATION: 98 % | BODY MASS INDEX: 29.35 KG/M2 | WEIGHT: 182.63 LBS | TEMPERATURE: 97 F | RESPIRATION RATE: 16 BRPM | HEIGHT: 66.18 IN | HEART RATE: 82 BPM

## 2019-03-01 DIAGNOSIS — C25.0 MALIGNANT NEOPLASM OF HEAD OF PANCREAS (HCC): Primary | ICD-10-CM

## 2019-03-01 DIAGNOSIS — C77.2 SECONDARY MALIGNANT NEOPLASM OF RETROPERITONEAL LYMPH NODES (HCC): ICD-10-CM

## 2019-03-01 DIAGNOSIS — C78.7 SECONDARY ADENOCARCINOMA OF LIVER (HCC): ICD-10-CM

## 2019-03-01 PROCEDURE — 99214 OFFICE O/P EST MOD 30 MIN: CPT | Performed by: INTERNAL MEDICINE

## 2019-03-01 RX ORDER — LISINOPRIL AND HYDROCHLOROTHIAZIDE 12.5; 1 MG/1; MG/1
1 TABLET ORAL DAILY
COMMUNITY
Start: 2019-01-28

## 2019-03-01 NOTE — PROGRESS NOTES
Patient is here for MD f/u for pancreatic cancer. Patient had a left nephrostomy tube placed on 2/27/19. Mild pain at surgical site. The fluid that is draining is bloody. Appetite is good. Weight is down 3 lbs but that was intentionally.  Energy level is fa

## 2019-03-04 PROBLEM — H40.059 OHT (OCULAR HYPERTENSION): Status: ACTIVE | Noted: 2017-06-26

## 2019-03-04 PROBLEM — M85.852 OSTEOPENIA OF BOTH THIGHS: Status: ACTIVE | Noted: 2018-06-29

## 2019-03-04 PROBLEM — Q62.11 HYDRONEPHROSIS WITH URETEROPELVIC JUNCTION OBSTRUCTION: Status: ACTIVE | Noted: 2019-02-20

## 2019-03-04 PROBLEM — M85.851 OSTEOPENIA OF BOTH THIGHS: Status: ACTIVE | Noted: 2018-06-29

## 2019-03-04 RX ORDER — METOCLOPRAMIDE HYDROCHLORIDE 5 MG/ML
10 INJECTION INTRAMUSCULAR; INTRAVENOUS EVERY 6 HOURS PRN
Status: CANCELLED | OUTPATIENT
Start: 2019-01-01

## 2019-03-04 RX ORDER — LORAZEPAM 2 MG/ML
INJECTION INTRAMUSCULAR EVERY 4 HOURS PRN
Status: CANCELLED | OUTPATIENT
Start: 2019-01-01

## 2019-03-04 RX ORDER — PROCHLORPERAZINE MALEATE 10 MG
10 TABLET ORAL EVERY 6 HOURS PRN
Status: CANCELLED | OUTPATIENT
Start: 2019-01-01

## 2019-03-04 RX ORDER — ONDANSETRON 2 MG/ML
8 INJECTION INTRAMUSCULAR; INTRAVENOUS EVERY 6 HOURS PRN
Status: CANCELLED | OUTPATIENT
Start: 2019-01-01

## 2019-03-04 RX ORDER — ATROPINE SULFATE 0.4 MG/ML
0.2 AMPUL (ML) INJECTION ONCE
Status: CANCELLED | OUTPATIENT
Start: 2019-01-01

## 2019-03-04 RX ORDER — LORAZEPAM 0.5 MG/1
TABLET ORAL EVERY 4 HOURS PRN
Status: CANCELLED | OUTPATIENT
Start: 2019-01-01

## 2019-03-04 RX ORDER — FLUOROURACIL 50 MG/ML
1500 INJECTION, SOLUTION INTRAVENOUS CONTINUOUS
Status: CANCELLED | OUTPATIENT
Start: 2019-01-01

## 2019-03-05 NOTE — PROGRESS NOTES
Pt here for C1D1.   Arrives Ambulating independently, accompanied by Spouse           Modifications in dose or schedule: No     Frequency of blood return and site check throughout administration: Prior to administration and At completion of therapy   Discha

## 2019-03-05 NOTE — PROGRESS NOTES
IV Chemotherapy Education    Patient: Dana Rater  Date: 3/5/19    Diagnosis:Metastatic pancreatic cancer   Caregivers present: spouse     Drug names:  Irintecan Leucovorin Flourouacil     Treatment Effects on Bone Marrow:  Chemotherapy action on cance for fatimah Pa FNP-BC  [facility]

## 2019-03-05 NOTE — PROGRESS NOTES
[facility]    PATIENT'S NAME: Mary Serrano   ATTENDING PHYSICIAN: Amarilys Gamez M.D.    PATIENT ACCOUNT #: [de-identified] LOCATION: [address] [place] [address] RECORD #: [id number] YOB: 1950   DATE OF SERVICE: 03/01/2019       TANESHA lisinopril/hydrochlorothiazide 10/12.5 daily, metoprolol extended release 25 mg daily, ondansetron 8 mg q.8 h. p.r.n., prochlorperazine 10 mg q.6 h. p.r.n., vitamin D3 at 1000 units daily.     PHYSICAL EXAMINATION:    GENERAL:  She is a well-developed, well

## 2019-03-07 NOTE — PROGRESS NOTES
CADD pump empty. Port flushed per protocol and port de accessed. Patient reports feeling good, denies diarrhea or nausea. fulphila injection given. Patient instructed to take claritin and tylenol for pain. Patient verbalizes understanding.  Discharged to [facility]

## 2019-03-08 NOTE — PROGRESS NOTES
Date of Treatment: 3/5/19                           Chemo: FOLFIRI    Comments: Pt at [facility] for D3 pump d/c and Fulphila injection    Recommendations: see nursing note. Confirmed next scheduled appointment (s).

## 2019-03-12 NOTE — PROGRESS NOTES
Patient presents with: Follow - Up: APN assessment Day 8    Pt is here for follow up; Day 8 onivyde/leucovorin/fluorouracil. Pt reports she did not have a good week; she does not feel recovered from her last treatment.  She has had persistent nausea, vomit

## 2019-03-12 NOTE — PROGRESS NOTES
Education Record    Learner:  Patient    Disease / Diagnosis: Pancreatic Cancer    Barriers / Limitations:  None   Comments:    Method:  Brief focused   Comments:    General Topics:  Medication, Side effects and symptom management and Plan of care reviewed

## 2019-03-12 NOTE — PROGRESS NOTES
ANP Visit Note    Patient Name: Jl Wilson   YOB: 1950   Medical Record Number: [id number]   CSN: [id number]   Date of visit: 3/12/2019       Chief Complaint/Reason for Visit:  Patient presents with:   Follow - Up: APN assessment Day COLONOSCOPY  2005    small adenomatous polyp   • COLONOSCOPY & POLYPECTOMY  2/10/16    diverticulosis and small polyp removed; ASC   • COLONOSCOPY, POSSIBLE BIOPSY, POSSIBLE POLYPECTOMY 18139 N/A 2/10/2016    Performed by Percival Cooks, MD at [facility] Not on file    Social History Narrative      Not on file      Medications:    Current Outpatient Medications:   •  MONOJECT FLUSH SYRINGE 0.9 % Intravenous Solution, FLUSH DRAIN WITH 10 ML D, Disp: , Rfl: 5  •  Pantoprazole Sodium 40 MG Oral Tab EC, Take Neck: No JVD. No palpable lymphadenopathy. Neck is supple. Chest: Clear to auscultation. Heart: Regular rate and rhythm. Abdomen: Soft, non tender with good bowel sounds. Left Nephrostomy tube. Extremities: Pedal pulses are present. No edema.   Freddy Preciado Date: 03/12/2019  Value: 38*         Ref range: >=60               Status: Final  AST                                           Date: 03/12/2019  Value: 22          Ref range: 15 - 37 U/L        Status: Final  ALT 03/12/2019  Value: 30.8        Ref range: 26.0 - 34.0 pg     Status: Final  MCHC                                          Date: 03/12/2019  Value: 33.6        Ref range: 31.0 - 37.0 g/dL   Status: Final  RDW                                           Date: Ref range: %                  Status: Final  Immature Granulocyte %                        Date: 03/12/2019  Value: 1.1         Ref range: %                  Status: Final  Slide Review                                  Date: 03/12/2019  Value: Slide revie

## 2019-03-14 NOTE — PROCEDURES
[facility]  Procedure Note    Bryce Grace Hailey Patient Status:  Outpatient in a Bed    1950 MRN [id number]   Location [address] [facility] Attending Kamilah Lawson MD   Hosp Day # 0 PCP HOSP [facility]     Procedure: left p

## 2019-03-14 NOTE — PROGRESS NOTES
Pt A/Ox4. IRAHETA. HOB elevated. Left flank neph tube with dressing, CDI. Denies any pain. VSS. Voiding without difficulty. Off bedrest at 1300, ambulated in unit. Discharge instructions given, pt verbalized understanding.  Patient scheduled to return next [facility]

## 2019-03-15 NOTE — TELEPHONE ENCOUNTER
Patient had nephrostomy tube capped yesterday and stent placed. Patient will go back next week to check the catheter to see if it needs to be removed. Could patient delay her chemo next week due to nephrostomy tube procedure?  Patient has been having interm

## 2019-03-19 NOTE — TELEPHONE ENCOUNTER
Patient called to speak with Jennifer More directly about what they had discussed during the office visit last week. Message forwarded to Jennifer French, JAEL.

## 2019-03-19 NOTE — TELEPHONE ENCOUNTER
Patient reports protonix is making her nauseated so she switched back to zantac BID . No issues taking the zantac. She is fatigued and cough has improved with zantac.   Patient instructed to come to chemotherapy visit even if fatigued and Dr. Swapna Lawrence with e

## 2019-03-20 NOTE — PROGRESS NOTES
S/p nephrostomy tube removal, pt in stable condition, no sedation received in IR lab. Pt A&Ox4, VSS, denies any pain/discomfort, tolerated PO well. Dressing to L lower back CDI, no evidence of bleeding/complications.  Pt being discharged home, discharge ins

## 2019-03-22 NOTE — PROGRESS NOTES
Pt here for C2D1.   Arrives Ambulating independently, accompanied by Self           Modifications in dose or schedule: No     Frequency of blood return and site check throughout administration: Prior to administration, Prior to each drug and At completion o

## 2019-03-22 NOTE — PROGRESS NOTES
Patient is here for MD f/u and cycle 2. Patient was in IR last week and had left ureteral stent placed and had nephrostomy tube placement checked. Patient went back to IR on Wednesday to check on stent placement.  At that time patient was able to have nephr

## 2019-03-24 NOTE — PROGRESS NOTES
Education Record    Learner:  Patient    Disease / Diagnosis: Pancreatic CA - fulphila injection and pump d/c    Barriers / Limitations:  None   Comments:    Method:  Brief focused and Reinforcement   Comments:    General Topics:  Plan of care reviewed   C

## 2019-03-25 NOTE — PROGRESS NOTES
[facility]    PATIENT'S NAME: Eboniezachary Josephila   ATTENDING PHYSICIAN: Rosy Monte M.D.    PATIENT ACCOUNT #: [de-identified] LOCATION: [address] [place] [address] RECORD #: [id number] YOB: 1950   DATE OF SERVICE: 03/22/2019       TANESHA days.  Her current medications include carboxymethylcellulose eyes drops, Premarin vaginal cream which she uses sparingly, fluoxetine 20 mg daily, hydrocodone/acetaminophen which is on hold, latanoprost ophthalmic solution, levothyroxine 75 mcg daily, melvin I would watch her for another cycle or 2. I will ask her to get her markers done prior to the visit the next time. We do often see a bump up initially as patients might respond and then a drop thereafter.   Again, it is fairly possible that she is respond

## 2019-03-28 NOTE — TELEPHONE ENCOUNTER
Patient c/o visual ( Rx)changes and dry eyes. No double vision or floaters. Started earlier this week.  Patient had an eye exam in February at the [facility] and it was normal. Advised patient to try over the counter Systane eye drops for her dry ey

## 2019-03-29 NOTE — TELEPHONE ENCOUNTER
Patient had one episode of diarrhea. Took two Imodium and symptoms have subsided. Patient is concerned she will develop more over the weekend and is asking what she needs to do to manage this. Reviewed diarrhea management with patient.

## 2019-04-05 PROBLEM — N18.2 STAGE 2 CHRONIC KIDNEY DISEASE: Status: ACTIVE | Noted: 2019-01-01

## 2019-04-05 NOTE — PROGRESS NOTES
Patient is here for cycle 3 of chemo. Labs drawn yesterday. Having a more difficult time with this chemo. Frequent nausea episodes. Intermittent diarrhea. Patient c/o head congestion and sneezing. Chronic dry eyes. Using eye drops twice daily.  Eyes are mor

## 2019-04-05 NOTE — PROGRESS NOTES
Pt here for C3D1. Arrives Ambulating independently, accompanied by Spouse           Modifications in dose or schedule: No, but getting next chemo the Tuesday after Easter instead of the Friday before.      Frequency of blood return and site check throughou

## 2019-04-08 NOTE — PROGRESS NOTES
[facility]    PATIENT'S NAME: Evelio Timothy   ATTENDING PHYSICIAN: Sha Jeffers M.D.    PATIENT ACCOUNT #: [de-identified] LOCATION: [address] [place] [address] RECORD #: [id number] YOB: 1950   DATE OF SERVICE: 04/05/2019       TANESHA counts. Her back pain which had developed has completely resolved. Her nephrostomy tube was interrogated on March 20, and because of normal function in the left ureter and patency of the ureteral system, it was removed.   She had had some vague headaches, Conjunctivae are pink. Sclerae anicteric. Pharynx without lesion. LYMPHATICS:  She has no cervical, supraclavicular, or axillary adenopathy. LUNGS:  Resonant to percussion and clear to auscultation. No wheezing, rales, or rhonchi.     HEART:  Regula

## 2019-04-23 NOTE — PROGRESS NOTES
Pt here for C4D1 Irinotecan/Leucovorin/5FU.   Arrives Ambulating independently, accompanied by Spouse           Modifications in dose or schedule: no     Frequency of blood return and site check throughout administration: Prior to administration and At comp

## 2019-04-23 NOTE — PROGRESS NOTES
Patient is here for cycle 4 of chemo. Patient tolerated last chemo cycle better. Intermittent nausea. She is taking antiemetics as needed. Mild reflux and indigestion. Taking Zantac in the mornings. No diarrhea or constipation.  Appetite and energy level i

## 2019-04-25 NOTE — PROGRESS NOTES
Education Record    Learner:  Patient    Disease / Diagnosis: malignant neoplasm of head of pancreas    Barriers / Limitations:  None   Comments:    Method:  Brief focused   Comments:    General Topics:  Plan of care reviewed   Comments:    Outcome:  Shows

## 2019-04-30 NOTE — PROGRESS NOTES
[facility]    PATIENT'S NAME: Peter Ashford   ATTENDING PHYSICIAN: Beth Velázquez M.D.    PATIENT ACCOUNT #: [de-identified] LOCATION: [address] [place] [address] RECORD #: [id number] YOB: 1950   DATE OF SERVICE: 04/23/2019       TANESHA fluoxetine 20 mg daily, hydrocodone/acetaminophen 5/325 one tablet q.4 h. p.r.n., latanoprost ophthalmic solution 1 drop daily both eyes, levothyroxine 75 mcg daily, lisinopril/hydrochlorothiazide 10/12.5 daily, metoprolol succinate extended release 25 mg

## 2019-05-14 NOTE — PROGRESS NOTES
ANP Visit Note    Patient Name: Missy Macias   YOB: 1950   Medical Record Number: [id number]   CSN: [id number]   Date of visit: 5/14/2019       Chief Complaint/Reason for Visit:  Patient presents with:   Follow - Up: APN assessment sundeep adenomatous polyp   • COLONOSCOPY & POLYPECTOMY  2/10/16    diverticulosis and small polyp removed; ASC   • COLONOSCOPY, POSSIBLE BIOPSY, POSSIBLE POLYPECTOMY 40988 N/A 2/10/2016    Performed by Adina Rojas MD at [address] Not on file      Medications:    Current Outpatient Medications:   •  aspirin 325 MG Oral Tab EC, Take 325 mg by mouth daily. , Disp: , Rfl:   •  raNITIdine HCl 150 MG Oral Tab, Take 1 tablet (150 mg total) by mouth 2 (two) times daily. , Disp: 60 tablet, Rf HEENT: EOMs intact. PERRL. Oropharynx is clear. Neck: No JVD. No palpable lymphadenopathy. Neck is supple. Chest: Clear to auscultation. Heart: Regular rate and rhythm. Abdomen: Soft, non tender with good bowel sounds.   Extremities: Pedal pulses ar Status: Final  CO2                                           Date: 05/13/2019  Value: 27.0        Ref range: 21.0 - 32.0 mmol*  Status: Final  Anion Gap                                     Date: 05/13/2019  Value: 6           Ref range: 0 - 18 mmol/L Date: 05/13/2019  Value: 3.6         Ref range: 2.8 - 4.4 g/dL     Status: Final  A/G Ratio                                     Date: 05/13/2019  Value: 1.0         Ref range: 1.0 - 2.0          Status: Final  WBC Date: 05/13/2019  Value: 0.43        Ref range: 0.10 - 1.00 x10(*  Status: Final  Eosinophil Absolute                           Date: 05/13/2019  Value: 0.28        Ref range: 0.00 - 0.70 x10(*  Status: Final  Basophil Absolute Risk Level: HIGH Metastatic pancreatic cancer    Electronically Signed by:    Ayo Smyth FNP-BC  Nurse Practitioner  [facility]

## 2019-05-14 NOTE — PROGRESS NOTES
Patient presents with: Follow - Up: APN assessment prior to treatment    Pt is here for treatment; C5 D1 liposomal irinotecan/5FU/leucovorin is expected. Pt now has 3 weeks between cycles and she feels good coming in today. Denies N,V,D,C, pain or fevers.

## 2019-05-14 NOTE — PROGRESS NOTES
Pt here for C5.   Arrives Ambulating independently, accompanied by Self and Spouse           Modifications in dose or schedule: No     Frequency of blood return and site check throughout administration: Prior to administration and At completion of therapy

## 2019-05-16 NOTE — PROGRESS NOTES
Education Record    Learner:  Patient    Disease / Diagnosis:5fu pump d/c and fulphila    Barriers / Limitations:  None   Comments:    Method:  Discussion and Reinforcement   Comments:    General Topics:  Infection, Medication, Procedure, Side effects and

## 2019-05-21 NOTE — TELEPHONE ENCOUNTER
Patient called asking about her creatine level. Patient was concern that her level increased from 1.20 to 1.39. Patient was wanting to make sure that she was still able to travel. I spoke to [facility] APN.  Per APN we will continue to watch her levels and at th

## 2019-05-23 NOTE — TELEPHONE ENCOUNTER
Pt states she received chemo a week ago on Tuesday. She became very constipated. She spoke with her PCP who instructed her to take miralax. She took 1 dose of miralax on Saturday. On Monday morning she had a good bowel movement.   She states she felt g

## 2019-05-24 NOTE — PROGRESS NOTES
Nutrition screen complete as triggered by [facility] [employer] dx of metastatic pancreatic (head) cancer. Chart reviewed. Pt appears nutritionally stable at present. RD available on consult.

## 2019-05-28 NOTE — PROGRESS NOTES
Pt here for C6D1.   Arrives Ambulating independently, accompanied by Self and Spouse           Modifications in dose or schedule: No     Frequency of blood return and site check throughout administration: Prior to administration, Every 2-3 ml IVP and At com

## 2019-05-28 NOTE — PROGRESS NOTES
MD f/u for metastatic pancreatic ca. Due for C6D1 onivyde/LV/5FU. Reports that she is feeling good, denies pain or N/V. Has been following BRAT diet since last week in hopes to regulate her bowels better; will go from diarrhea to constipation.  Using Imodiu

## 2019-05-29 NOTE — PROGRESS NOTES
[facility]    PATIENT'S NAME: Peter Ashford   ATTENDING PHYSICIAN: Beth Velázquez M.D.    PATIENT ACCOUNT #: [de-identified] LOCATION: [address] [place] [address] RECORD #: [id number] YOB: 1950   DATE OF SERVICE: 05/28/2019       TANESHA meats, and apples and bananas along with the rest of her diet. She also had too much effect from taking a little bit of Imodium and will be careful about using this.   Her current medications include amoxicillin prior to dental work, aspirin 325 mg daily, her on the same doses. She will be back in 2 weeks for another cycle. She is going to broaden her diet a bit. I will ask her to meet with our dietician if it continues to be a struggle.   At that time, we may increase the interval between her treatments

## 2019-05-30 NOTE — PROGRESS NOTES
Nutrition Consultation    Patient Name: Adam Mcknight  YOB: 1950  Medical Record Number: [id number]   Account Number: [de-identified]  Dietitian: Carmelina Devi RD    Date of visit: 5/28/2019    Diet Rx: high protein/calorie; fiber controlle eyes daily. , Disp: , Rfl:     Pertinent Labs: Noted    Height: 5'6.26\"         IBW: 130 +/- 10%    WT HX:   05/28/19: 175 lbs  05/14/19: 181 lbs  04/05/19: 175 lbs 6.  Oz  03/12/19: 179 lbs    Estimated Nutrition Needs: 30-32 kcals/kg IBW = [id number] KCA

## 2019-06-03 NOTE — TELEPHONE ENCOUNTER
Spoke with pt- called to discuss her irregular bowels. States she had gone 1 week without diarrhea, then this past Saturday she took 2000 mg of Amoxicillin prior to a dental exam and that night had 3 episodes of diarrhea.  Sunday her bowels were better and

## 2019-06-05 NOTE — TELEPHONE ENCOUNTER
Patient called and stated that she had 3 episodes of diarrhea. She took imodium with good results. Patient has denise pushing fluids and is on a low fiber diet. She was reassured that she is managing at home.  She was instructed to call if diarrhea gets worse

## 2019-06-11 NOTE — PROGRESS NOTES
MD f/u for pancreatic ca. Due for C7 chemo today. Reports ongoing issue with managing diarrhea. Will meet with dietician again today. Denies any N/V or pain. Energy level is good.      Education Record    Learner:  Patient, spouse    Barriers / Limitations:

## 2019-06-11 NOTE — PROGRESS NOTES
Pt here for C7D1 Irinotecan Liposome/ Leucovorin/5FU.   Arrives Ambulating independently, accompanied by Spouse           Modifications in dose or schedule: no     Frequency of blood return and site check throughout administration: Prior to administration a

## 2019-06-13 NOTE — PROGRESS NOTES
Patient aware that old pump malfunctioned and to be returned to [facility] to be serviced. Patient assigned a new pump. Serial # given to pharmacist, Amparo Magaña, to update patient records.

## 2019-06-13 NOTE — PROGRESS NOTES
[facility]    PATIENT'S NAME: Eboniezachary Josephila   ATTENDING PHYSICIAN: Rosy Monte M.D.    PATIENT ACCOUNT #: [de-identified] LOCATION: [address] [place] [address] RECORD #: [id number] YOB: 1950   DATE OF SERVICE: 06/11/2019       TANESHA fevers or chills. She is meeting with the dietician again today to discuss options with regard to her dietary intervention. She has no nausea, no vomiting, no abdominal pain. Her energy level is good.   Her hair has thinned, but she is not completely bal markedly elevated tumor marker. It is continuing to come down. She will need a CT scan some time in the next month or 2 in order to document her best response to date.   The issue will be how long her response will last with this current regimen and what

## 2019-06-13 NOTE — TELEPHONE ENCOUNTER
Pt calling with 5fu pump error. She spoke with 1-800 number and could not trouble shoot. Instructed patient to come in early. Will have pump serviced and issued new pump at next chemo. Pt verbalized understanding.

## 2019-06-20 NOTE — PROGRESS NOTES
Nutrition F/U Note     Patient Name: Nhi Alba  YOB: 1950  Medical Record Number: [id number]      Account Number: [de-identified]  Dietitian: Jaya Birmingham RD     Date of visit: 06/11/2019     Diet Rx: high protein/calorie; fiber controlled Place 1 drop into both eyes daily. , Disp: , Rfl:      Pertinent Labs: Noted     Height: 5'6.26\"           IBW: 130 +/- 10%     WT HX:   06/11/19: 173 lbs 2 oz  05/28/19: 175 lbs  05/14/19: 181 lbs  04/05/19: 175 lbs 6.  Oz  03/12/19: 179 lbs     Estimate

## 2019-06-21 NOTE — PROGRESS NOTES
Education Record    Learner:  Patient    Disease / Diagnosis:pancreatic ca    Barriers / Limitations:  None   Comments:    Method:  Discussion   Comments:    General Topics:  Diet, Infection, Medication, Pain, Precautions, Side effects and symptom manageme .

## 2019-07-02 NOTE — PROGRESS NOTES
Patient is here for cycle 8 of chemo. Patient reports diarrhea is better managed these past 3 weeks. Took Imodium twice the first week and nothing since. Appetite is good. Watching diet carefully d/t possible diarrhea. No nausea or vomiting.  Mild fatigue b

## 2019-07-02 NOTE — PROGRESS NOTES
Nutrition F/U Note     Patient Name: Leisa Douglass  YOB: 1950  Medical Record Number: [id number]      Account Number: [de-identified]  Dietitian: Yen Watson RD     Date of visit: 07/02/2019     Diet Rx: high protein/calorie; fiber controlled Place 1 drop into both eyes daily.  , Disp: , Rfl:      Pertinent Labs: Noted     Height: 5'6.26\"           IBW: 130 +/- 10%     WT HX:   07/02/19: 174 lbs 9.6 oz  06/11/19: 173 lbs 2 oz  05/28/19: 175 lbs  05/14/19: 181 lbs  04/05/19: 175 lbs 6.  Oz  03/1

## 2019-07-02 NOTE — PROGRESS NOTES
Pt here for C8D1 Irinotecan liposomal/leuco/5FU CADD pump for pancreatic CA.   Arrives ambulating independently, accompanied by Spouse           Modifications in dose or schedule: No     Frequency of blood return and site check throughout administration: Pr

## 2019-07-03 NOTE — PROGRESS NOTES
[facility]    PATIENT'S NAME: Terra Rio   ATTENDING PHYSICIAN: Griselda Sevilla M.D.    PATIENT ACCOUNT #: [de-identified] LOCATION: [address] [place] [address] RECORD #: [id number] YOB: 1950   DATE OF SERVICE: 07/02/2019       TANESHA regarding PARP inhibitors and immunotherapy. She is also planning on a trip to the [facility], and she would like to adjust her next treatment dates.   She will come in 1 week sooner for this next cycle to be treated at 2-week interval and then plans a 4-w cyanosis, or edema. NEUROLOGIC:  She is intact. LABORATORY DATA:  Her labs yesterday demonstrated a significant drop in her tumor marker from 1600 to 780.   Her alkaline phosphatase is now normal.  Her white count is 5.5, hemoglobin 11.9, platelets 145

## 2019-07-16 NOTE — PROGRESS NOTES
Patient is here for MD f/mac and cycle 9 of chemo. Patient c/o left buttock discomfort. Discomfort started a few weeks ago then resolved and now restarted again on Friday. BM are normal with daily Metamucil. Mild nausea first week after chemo. No vomiting.  P

## 2019-07-16 NOTE — PROGRESS NOTES
Pt here for C9D1 Irinotecan/Leucovorin/5FU.   Arrives Ambulating independently, accompanied by Spouse           Modifications in dose or schedule: no     Frequency of blood return and site check throughout administration: Prior to administration and At comp

## 2019-07-18 NOTE — PROGRESS NOTES
Education Record    Learner:  Patient    Disease / Diagnosis: Pancreatic Cancer    Barriers / Limitations:  None   Comments:    Method:  Brief focused and Discussion   Comments:    General Topics:  Medication, Side effects and symptom management and Plan o

## 2019-07-24 NOTE — PROGRESS NOTES
[facility]    PATIENT'S NAME: Sergei Tate   ATTENDING PHYSICIAN: Mauricio Cabral M.D.    PATIENT ACCOUNT #: [de-identified] LOCATION: [address] [place] [address] RECORD #: [id number] YOB: 1950   DATE OF SERVICE: 07/16/2019       TANESHA is tolerating it well with no evidence of bleeding. Her weight is stable. Her appetite is good.   She is hoping to go to [place] [facility] to attend a summer camp related to her [employer], and as a result, she is here at a 2-week interval instead of a 3-week inter February. She has improvement in the left retroperitoneal lymphadenopathy as well. She has a stable peripancreatic lymph node. She has this new renal vein thrombosis, and she is now anticoagulated and appears to be doing fine with this.   She is continui

## 2019-08-13 NOTE — PROGRESS NOTES
Patient is here for MD f/u and cycle 10 of chemo. Patient c/o left flank pain. Urinary urgency and leakage. Taking Tylenol with good relief. Following up with urology, Dr Zana Peterson next week for left ureteral stent exchange. Continues on Xarelto daily.  P

## 2019-08-13 NOTE — PROGRESS NOTES
Pt here for C10D1. Arrives Ambulating independently, accompanied by Spouse           Patient denies possible pregnancy since last therapy cycle: Yes.     Modifications in dose or schedule: No.     Frequency of blood return and site check throughout adminis

## 2019-08-20 NOTE — ANESTHESIA POSTPROCEDURE EVALUATION
[address] Patient Status:  Hospital Outpatient Surgery   Age/Gender 71year old female MRN [id number]   [facility] SURGERY Attending Анна Bartlett MD   Hosp Day # 0 PCP [facility]       Anesthesia Post-

## 2019-08-20 NOTE — ANESTHESIA PREPROCEDURE EVALUATION
PRE-OP EVALUATION    Patient Name: Missy Macias    Pre-op Diagnosis: Obstruction of left ureter [N13.5]    Procedure(s):  CYSTOSCOPY, LEFT URETERAL STENT EXCHANGE    Surgeon(s) and Role:     * Petrona German MD - Primary    Pre-op vitals revie GI/Hepatic/Renal  Comment: Left ureteral obstruction has stent in place now for stent exchange.   Pancreatic malignancy with secondary spread to liver           (+) chronic renal disease and CRI  (+) liver disease                 Cardiovascular      ECG re (-) murmur   Dental             Pulmonary    Pulmonary exam normal.  Breath sounds clear to auscultation bilaterally. Other findings            ASA: 3   Plan: MAC  NPO status verified and patient meets guidelines.         Comment: GA withOETT

## 2019-08-20 NOTE — OPERATIVE REPORT
[facility] Patient Status:  Hospital Outpatient Surgery    1950 MRN [id number]   [facility] SURGERY Attending Stephane Felix MD   Hosp Day # 0 PCP [facility]        DATE of OPER patient was awakened and taken to the recovery area in stable condition. The patient will be discharged to home and follow up in 4-6 months for stent change.      Steven Angel MD  8/20/2019  5:09 PM

## 2019-08-20 NOTE — H&P
Pre-op Diagnosis: Obstruction of left ureter [N13.5]    The above referenced H&P from 7/24/19 by Dr Marie Powell was reviewed by Breonna Freeman MD on 8/20/2019, the patient was examined and no significant changes have occurred in the patient's condition s

## 2019-08-23 NOTE — TELEPHONE ENCOUNTER
Patient had a left ureter stent exchange this week and was put on Keflex. Patient will be following up with the dentist next week and will have to be on Amoxicillin. Patient is asking if both antibiotics are necessary.  Per Dr Milli Loza, patient will have to t

## 2019-08-28 NOTE — PROGRESS NOTES
[facility]    PATIENT'S NAME: Lobo Lupe   ATTENDING PHYSICIAN: Nereyda Fonseca M.D.    PATIENT ACCOUNT #: [de-identified] LOCATION: [address] [place] [address] RECORD #: [id number] YOB: 1950   DATE OF SERVICE: 08/13/2019       TANESHA she did well with this. She is back on schedule now with her chemotherapy today and continues at 3-week intervals from this point forward.     MEDICATIONS:  Her current medications include Tylenol p.r.n.; amoxicillin 2 g prior to dental work; aspirin 325 m treatment with gemcitabine and nab-paclitaxel in the future if and when she progresses on the current treatment. She has had germline testing which failed to demonstrate evidence of BRCA mutation.   She would still be a candidate for additional somatic robby

## 2019-09-03 NOTE — PROGRESS NOTES
[facility]    PATIENT'S NAME: Alvaro Chris   ATTENDING PHYSICIAN: Lovely Davies M.D.    PATIENT ACCOUNT #: [de-identified] LOCATION: [address] [place] [address] RECORD #: [id number] YOB: 1950   DATE OF SERVICE: 09/03/2019       TANESHA fluoxetine 20 mg daily; levothyroxine 75 mcg daily; lisinopril/hydrochlorothiazide 10/12.5 daily; metoprolol succinate extended release 25 mg daily; ondansetron 8 mg q.8 h. p.r.n.; prochlorperazine 10 mg q.6 h. p.r.n.; ranitidine 150 mg b.i.d.; and rivarox

## 2019-09-03 NOTE — PROGRESS NOTES
Pt here for C11D1.   Arrives Ambulating independently, accompanied by Spouse           Patient denies possible pregnancy since last therapy cycle: na    Modifications in dose or schedule: No     Frequency of blood return and site check throughout [facility]

## 2019-09-03 NOTE — PROGRESS NOTES
Patient is here for MD f/u and cycle 11 of chemo. Patient c/o intermittent upper abdominal discomfort and gas pains. No nausea or vomiting. Today is patient's last day of Amoxicillin following dental procedure.  Patient had left ureter stent exchange on 8/2

## 2019-09-24 NOTE — PROGRESS NOTES
Patient is here for MD F/u and cycle 12 of chemo. Patient c/o bilateral rib pain. Patient is taking Tylenol ES regularly. Patient states pain is becoming more frequent. No nausea or vomiting. No diarrhea or constipation. Eating well. Denies cough or SOB.  E

## 2019-09-24 NOTE — PROGRESS NOTES
[facility]    PATIENT'S NAME: Vikash Bingham   ATTENDING PHYSICIAN: Robe Barker M.D.    PATIENT ACCOUNT #: [de-identified] LOCATION: [address] [place] [address] RECORD #: [id number] YOB: 1950   DATE OF SERVICE: 09/24/2019       TANESHA ophthalmic solution both eyes daily, levothyroxine 75 mcg daily, lisinopril/hydrochlorothiazide 10/12.5 daily, metoprolol succinate extended release 25 mg daily, ondansetron 8 mg q.8 h. p.r.n., prochlorperazine 10 mg q.6 h. p.r.n., ranitidine 150 mg b.i.d. planned week-long trip to the [facility].    Dictated By Kim Belcher M.D.  d: 09/24/2019 09:31:22  t: 09/24/2019 11:51:02  [facility] [id number]  /    cc: Dr. Pietro Calderon MD

## 2019-10-11 NOTE — PROGRESS NOTES
Pt here for C13D1.   Arrives Ambulating independently, accompanied by Spouse           Patient denies possible pregnancy since last therapy cycle: Not Applicable    Modifications in dose or schedule: Yes     Frequency of blood return and site check througho

## 2019-10-13 NOTE — PROGRESS NOTES
Education Record    Learner:  Patient      Barriers / Limitations:  None   Comments:    Method:  Discussion   Comments:    General Topics:  Plan of care reviewed   Comments:    Outcome:  Shows understanding   Comments:    Patient here for pump disconnect a

## 2019-10-14 NOTE — PROGRESS NOTES
[facility]    PATIENT'S NAME: Viki Singh   ATTENDING PHYSICIAN: Nighat Huber M.D.    PATIENT ACCOUNT #: [de-identified] LOCATION: [address] [place] [address] RECORD #: [id number] YOB: 1950   DATE OF SERVICE: 10/11/2019       TANESHA family, in the next several weeks, and she is planning on being back in about 3 weeks.       MEDICATIONS:  Her current medications include amoxicillin prior to dental work, carboxymethylcellulose eyedrops, estrogen vaginal cream p.r.n., fluoxetine 20 mg avni her to stay on the current treatment for at least 1 more cycle. When she returns, we will recheck her markers. If they are starting to climb, we may have to change her to the planned next-line therapy of gemcitabine and nab-paclitaxel.   I reviewed with perfecto

## 2019-11-01 NOTE — PROGRESS NOTES
Pt here for C14.   Arrives Ambulating independently, accompanied by Spouse           Patient denies possible pregnancy since last therapy cycle: Not Applicable    Modifications in dose or schedule: No     Frequency of blood return and site check throughout

## 2019-11-01 NOTE — PROGRESS NOTES
Patient is here for MD f/u for pancreatic cancer and cycle 14 of chemo. Patient c/o pain through out her entire back. Pain started after a 12 day road trip about 3 weeks ago. No urinary complaints. Denies cough or SOB.  Patient had been taking Tylenol regul

## 2019-11-03 NOTE — PROGRESS NOTES
PT. Still with back pain. She stated she was up for about 5 hour last night and does not feel that the tramadol is providing much relief. She said she will call tomorrow.

## 2019-11-04 NOTE — PROGRESS NOTES
[facility]    PATIENT'S NAME: Rodney Noble   ATTENDING PHYSICIAN: Jonathan Ledezma M.D.    PATIENT ACCOUNT #: [de-identified] LOCATION: [address] [place] [address] RECORD #: [id number] YOB: 1950   DATE OF SERVICE: 11/01/2019       TANESHA daily, metoprolol succinate extended release 25 mg daily, ondansetron 8 mg q.8 h. p.r.n., ranitidine 150 mg b.i.d., rivaroxaban 20 mg daily. PHYSICAL EXAMINATION:    GENERAL:  She is a well-developed, well-nourished female.   She is in no acute distres pancreatic cancer. The patient did have germline testing and has no evidence of a BRCA mutation. She could, however, have a somatic BRCA mutation. This would require testing of her tumor, and we have not gotten enough tissue in order to do this to date.

## 2019-11-04 NOTE — TELEPHONE ENCOUNTER
Selestino Canavan called to speak with Vaishnavi Mauricio. She said they have been speaking over the past few days and she is looking to talk again.  Please advise

## 2019-11-06 NOTE — TELEPHONE ENCOUNTER
Abdias Driscoll is calling to ask a question about the side effects to Dulcolax besides diarrhea, please advise.

## 2019-11-06 NOTE — TELEPHONE ENCOUNTER
Spoke with patient. Instructed her to not take any additional laxatives due to diarrhea. She verbalized understanding.

## 2019-11-06 NOTE — TELEPHONE ENCOUNTER
Andreina Robles called to speak with Blayne Carrillo. She said they have been speaking over the past few days and she is looking to talk again.  Please advise

## 2019-11-06 NOTE — TELEPHONE ENCOUNTER
Spoke with patient. She wanted to notify us that she took tramadol this weekend and thinks it caused constipation. She called her primary who recommended a laxative. Patient took medication with adequate results.  She is no longer having issues and wanted R

## 2019-11-07 NOTE — TELEPHONE ENCOUNTER
Returned call to Ruben Chavez - Pancreatic Cancer - 11/1/19 C14 5fu/Camptosar/Onivyde    Fatigue - Grade 1  Post chemo fatigue  Constipation - Grade 1 (using Tramadol for pain-had been on a long road trip and muscles sore, used Doculax then had some loose stools)

## 2019-11-13 NOTE — TELEPHONE ENCOUNTER
Rell Durham says she had a scan scheduled this week so she could go over it Tuesday with , but due to her husbands needs, the easiest time to have it would be Monday morning.  If she moves it to Monday, she's not sure it will be ready in time for her emperatriz

## 2019-11-13 NOTE — TELEPHONE ENCOUNTER
Angelica Curtis says she has had ongoing diarrhea for over a week. She says she's talked to a nurse about this before, and they said it should get better, but she's not sure and still concerned. She also says that her back has been achy.  Please advise

## 2019-11-13 NOTE — TELEPHONE ENCOUNTER
Spoke with patient, ok to get Ct scan on Monday for MD f/u on Tuesday. Patient states her back pain continues to be a problem. She is taking one Tylenol as needed. She will further discuss at next appointment.

## 2019-11-13 NOTE — TELEPHONE ENCOUNTER
Patient informed she can take two extra strength Tylenol up to four times daily as needed for back pain. Patient to not exceed 4000 mg per day. Verbalized understanding.

## 2019-11-13 NOTE — TELEPHONE ENCOUNTER
Rosa Maza is calling with questions regarding medication Tylenol and her back pain, she forgot to ask you when she was talking to you earlier, please advise.

## 2019-11-18 NOTE — TELEPHONE ENCOUNTER
Out patient labs called to ask that orders for Abdias Driscoll get sent over. They can be faxed to [phone]. The patient is currently there. I attempted to reach by phone with no answer.  Please advise

## 2019-11-18 NOTE — TELEPHONE ENCOUNTER
Vicente Alford says she is at [facility] and would like to have her labs done, but her order was not released beyond the cancer center. Please advise.

## 2019-11-18 NOTE — TELEPHONE ENCOUNTER
Spoke with patient. Pain had been getting worse. Pancreatic mass has enlarged relatively dramatically in 5-6 weeks. Needs to discontinue current treatment and switch to gaemcitabine and nab-paclitaxel.   Will give about a 25% dose reduction due to heavil

## 2019-11-19 NOTE — PROGRESS NOTES
Patient is here for MD visit and to start a new chemo regimen, Abraxane and Gemzar. Consent signed. Patient c/o vomiting since last evening after dinner. She believes this to be the Norco she started yesterday for back pain.  Her pain is relieved with Norco

## 2019-11-19 NOTE — PATIENT INSTRUCTIONS
Anti-Nausea Medication:    Ondansetron (Zofran) -- can take every 8 hours as needed for nausea. Prochlorperazine (Compazine) -- can take every 6 hours as needed for nausea. To Alternate: Can take Compazine 4 hours after IV Zofran today.  Then 4 hours

## 2019-11-19 NOTE — PROGRESS NOTES
Patient came in today c/o nausea/vomiting that began when   She started taking norco yesterday for her back pain. The norco helped the back pain but now she is very nauseous and throwing up.  Brought back here for treatment; zofran/dex given and patient fel None  Method:  Discussion and Printed material  Outcome:  Shows understanding  Comments:

## 2019-11-20 NOTE — PROGRESS NOTES
[facility]    PATIENT'S NAME: Evelio Timothy   ATTENDING PHYSICIAN: Sha Jeffers M.D.    PATIENT ACCOUNT #: [de-identified] LOCATION: [address] [place] [address] RECORD #: [id number] YOB: 1950   DATE OF SERVICE: 11/19/2019       TANESHA months.     MEDICATIONS:  Amoxicillin prior to dental work, carboxymethylcellulose eye drops p.r.n., Premarin vaginal cream p.r.n., fluoxetine 20 mg daily, hydrocodone 10/325 one to two tablets q.i.d. p.r.n., latanoprost ophthalmic solution 0.005% 1 drop patricia could continue on maintenance with this on an alternate week dosing schedule. I reviewed the toxicities with her at length. She reviewed the consent and signed it. Toxicities will include hair loss. She can have peripheral neuropathy.   She can have penelope

## 2019-11-20 NOTE — TELEPHONE ENCOUNTER
Still having pain. Inconsistent relief with hydrocodone. Sometimes is controlled with two - other times takes a while to work.   Will send in a small amt of hydromorphone and instructed her to take one hydrocodone every 6 hours, and take a second one if n

## 2019-11-20 NOTE — TELEPHONE ENCOUNTER
Gerome Bumpers called in to speak to somebody about managing her back pain. She stated that her and Dr. Gianna Miller are working to find a med that works and asked that she please age a call back.

## 2019-11-21 NOTE — TELEPHONE ENCOUNTER
Spoke with pharmacist at [facility]. Verified patient is to get Hydromorphone Rx despite recently picking up Roselie Amanda made patient very sick to her stomach and she did not tolerate well. Ok to fill Hydromorphone Rx.

## 2019-11-21 NOTE — TELEPHONE ENCOUNTER
Pharmacy faxed questioning needing clarification on regimen on patients norco. Med limit exceeded.  Sent fax to right fax. Saurabh Nava

## 2019-11-22 NOTE — TELEPHONE ENCOUNTER
Kain Simon called saying she's been constipated since Tuesday. She says she is taking marilax, but it is not helping. She said she wanted to call in before the weekend.  Please call

## 2019-11-22 NOTE — TELEPHONE ENCOUNTER
Toxicities:  C1 D1 Paclitaxel Protein Bound/Gemcitabine HCL on 11/19/2019    Nausea & Vomiting/Constipation     Nausea: Grade 1  & Vomiting: Grade 1 (Having intermittent nausea since treatment.  Took zofran at 8am & ate farina at 8:15am. Two small emesis of

## 2019-11-26 NOTE — PROGRESS NOTES
Patient presents with:   Follow - Up: apn assessment prior to treatment    D8 Abraxane/Gemzar- Patient c/o fatigue and nausea last week used antiemetics with relief which caused constipation so patient used miralax and Prune juice with relief- denies abdomi

## 2019-11-26 NOTE — PROGRESS NOTES
Nutrition F/U Note     Patient Shira Areas  YOB: 1950  Medical Record Number: [id number]      Account Number: [de-identified]  Dietitian: Alexys Almeida RD     Date of visit: 11/27/19     Diet Rx: high protein/calorie; fiber controlled

## 2019-11-26 NOTE — PROGRESS NOTES
Pt here for C1D8 Abraxane/Gemzar.   Arrives Ambulating independently, accompanied by Spouse           Patient denies possible pregnancy since last therapy cycle: Not Applicable    Modifications in dose or schedule: Yes Creatinine elevated - received 1L of 0

## 2019-11-26 NOTE — PROGRESS NOTES
Cancer Center Progress Note    Patient Name: Philip Angeles   YOB: 1950   Medical Record Number: [id number]   CSN: [id number]   Date of visit: 11/26/2019   Provider: REZA Llamas  Referring Physician: No ref.  provider found much relief. She is slgihtly lightheaded. She has been in contact with the nursing staff for counseling on fluid management. Despite her efforts of fluid intake, she states she does not think she is able to keep up with her oral fluids.   Her final compl Carboxymethylcellulose Sodium (REFRESH LIQUIGEL OP), Apply to eye as needed. , Disp: , Rfl:   •  Ondansetron HCl (ZOFRAN) 8 MG tablet, Take 1 tablet (8 mg total) by mouth every 8 (eight) hours as needed for Nausea., Disp: 30 tablet, Rfl: 3  •  Metoprolol Goyal Value Ref Range    Magnesium 2.3 1.6 - 2.6 mg/dL     Results for Gabriel Haq (MRN [id number]) as of 11/26/2019 11:50   Ref.  Range 11/18/2019 08:41 11/25/2019 09:35   WBC Latest Ref Range: 4.0 - 11.0 x10(3) uL 10.1 2.9 (L)   Hemoglobin Latest Ref Will add a liter of IVF with today's chemo. She will continue to try to push fluids at home. Cytopenias: Following. Emotional Well Being:  I have assessed the patient's emotional well-being and any concerns about anxiety or depression.   No acute

## 2019-11-27 NOTE — TELEPHONE ENCOUNTER
Spoke with Dr Josué Connell and also with Morenita Almanzar. Now that we have some progression at primary - could potentially get an FNB and send this for NGS.     Faustino Crowley MD

## 2019-12-03 NOTE — PROGRESS NOTES
Pt here for C1D15 abraxane gem for pancreatic.   Arrives Ambulating independently, accompanied by Spouse           Patient denies possible pregnancy since last therapy cycle: Not Applicable    Modifications in dose or schedule: No     Frequency of blood ret

## 2019-12-03 NOTE — PROGRESS NOTES
Patient is here for MD f/mac and C1D15 of Abraxane/Gemzar. C/o frequent nose bleeds and rectal bleeding d/t constipation. Increase fatigue but no SOB. Nausea and vomiting better controlled this week. Decrease appetite.  Patient is urinating frequently and urg

## 2019-12-04 NOTE — TELEPHONE ENCOUNTER
Called pharmacy on behalf of patient. They are looking into insurance coverage and checking to make sure they have the quantity that was ordered in stock.

## 2019-12-04 NOTE — TELEPHONE ENCOUNTER
Hawacharmaine Montes called to speak with a nurse about a prescription that she is having a problem getting refilled.  She asked that she please get a call

## 2019-12-05 NOTE — TELEPHONE ENCOUNTER
Spoke to [employer], script faxed for magic mouth wash to her pharmacy. Encouraged bland diet. Pt to call if not better or getting worse and see APN.

## 2019-12-05 NOTE — TELEPHONE ENCOUNTER
Rosa Maza is calling to see if she can get some medication for mouth sores, it's very sore, please call patient.

## 2019-12-06 NOTE — TELEPHONE ENCOUNTER
Rell Durham calling medication that was prescribed yesterday she would like to discuss with RN and or Dr. Mil Mcgarry

## 2019-12-06 NOTE — TELEPHONE ENCOUNTER
Spoke with pt- she is asking how long she should take the medication for. Encouraged her to use it while mouth sores are healing, then okay to stop if not having any trouble eating or swallowing. May keep it on hand at home for future use.  She verbalized u

## 2019-12-09 NOTE — TELEPHONE ENCOUNTER
Maia Candelario called to speak with a nurse about a mouthwash that she was prescribed. She is going to be in tomorrow for labs at 9:30. She was wondering if there was any chance she would be able to speak with Elva Cottonwood when she is there.  She asked that she please get

## 2019-12-09 NOTE — TELEPHONE ENCOUNTER
Spoke to pt, magic mouthwash not helping. Painful to eat anything, eating a bland diet. Pt states she has lost weight. Pt no longer needing Norco, pt states she weened herself off and not having problems with constipation anymore.  Pt did take miralax and h

## 2019-12-10 NOTE — PROGRESS NOTES
Dx: Malignant Neoplasm of head of Pancreas. Treatment: Paclitaxel / Gemcitabine. Oncologist: Dr. Regina Chang       Patient presents to clinic for labs, APN assessment, and possible hydration.  Last treatment on 12/3/19 (Cycle 1 Day 15.) Next treatm

## 2019-12-10 NOTE — PROGRESS NOTES
Cancer Center Progress Note    Patient Name: Martha Amaro   YOB: 1950   Medical Record Number: [id number]   CSN: [id number]   Date of visit: 12/10/2019   Provider: REZA Alvarez  Referring Physician: No ref.  provider found Known Allergies    Vital Signs:  /85 (BP Location: Right arm, Patient Position: Sitting, Cuff Size: large)   Pulse 111   Temp 98.3 °F (36.8 °C) (Tympanic)   Resp 20   Ht 1.704 m (5' 7.09\")   Wt 72.7 kg (160 lb 3.2 oz)   SpO2 99%   BMI 25.03 kg/m² Nausea. (Patient not taking: Reported on 12/10/2019 ), Disp: 30 tablet, Rfl: 3  •  HYDROcodone-acetaminophen  MG Oral Tab, Take 1-2 tablets by mouth every 4 (four) hours as needed for Pain.  (Patient not taking: Reported on 12/10/2019 ), Disp: 240 tab GFR, Non- 51 (L) >=60    GFR, -American 59 (L) >=60    FASTING No    MAGNESIUM    Collection Time: 12/10/19  1:18 PM   Result Value Ref Range    Magnesium 1.9 1.6 - 2.6 mg/dL   CBC W/ DIFFERENTIAL    Collection Time: 12/10/19  1:18 P note relief  Continue oral hygiene, use salt water to facilitate healing but we discussed that she needs time for the sores to heal.  Will add sucralfate suspension 10ml QID.   Prefers to not take pain medicine         Emotional Well Being:  I have assessed

## 2019-12-11 NOTE — TELEPHONE ENCOUNTER
Patient called with an update. Patient received IV fluids yesterday. She feels a little better today but still feels weak and dehydrated and would like to get fluids again tomorrow. Per JAEL Esteves ok to get fluids. Will also need repeat labs.  Appt scheduled

## 2019-12-11 NOTE — TELEPHONE ENCOUNTER
Spoke with patient. Encouraged her to keep tomorrow's appointment for IV fluids. She is questioning what is the cause of her dehydration. Discussed the need for IV fluids,due to nausea, vomiting, diarrhea and appetite loss.  She is ok with getting fluids ju

## 2019-12-12 NOTE — PROGRESS NOTES
Cancer Center Progress Note    Patient Name: Jun Crawford   YOB: 1950   Medical Record Number: [id number]   CSN: [id number]   Date of visit: 12/12/2019   Provider: REZA Sevilla  Referring Physician: No ref.  provider found Cuff Size: large)   Pulse 92   Temp 97.9 °F (36.6 °C) (Tympanic)   Resp 18   Ht 1.704 m (5' 7.09\")   Wt 73.3 kg (161 lb 9.6 oz)   SpO2 98%   BMI 25.24 kg/m²        Medications:    Current Outpatient Medications:   •  [START ON 12/13/2019] Potassium Chlori Rfl:   •  FLUoxetine HCl 20 MG Oral Tab, Take 20 mg by mouth daily.   , Disp: , Rfl:   •  Levothyroxine Sodium 75 MCG Oral Tab, Take 75 mcg by mouth before breakfast.  , Disp: , Rfl:   •  Carboxymethylcellulose Sodium (REFRESH LIQUIGEL OP), Apply to eye as mg/dL    Calculated Osmolality 293 275 - 295 mOsm/kg    GFR, Non- 47 (L) >=60    GFR, -American 54 (L) >=60    FASTING No    CBC W/ DIFFERENTIAL    Collection Time: 12/12/19 11:32 AM   Result Value Ref Range    WBC 3.9 (L) 4.0 - 11. 0 Mouthwash and did not note relief  Continue oral hygiene w/ salt water rinses. Does not require pain medicine       Emotional Well Being:  I have assessed the patient's emotional well-being and any concerns about anxiety or depression.   No acute psychosoc

## 2019-12-12 NOTE — PROGRESS NOTES
Per Liya Banda, okay to return tomorrow for IVF and no labs needed. Patient to  oral potassium from pharmacy on way home from cancer center today. Patient may call and cancel appointment tomorrow for IVF if feeling well tomorrow.       Education Record

## 2019-12-17 NOTE — PROGRESS NOTES
Pt here for C2D1 Abraxane/Lucan.   Arrives Ambulating independently, accompanied by Spouse           Patient reports possible pregnancy since last therapy cycle: Not Applicable    Modifications in dose or schedule: Yes -- patient will now be having treatment

## 2019-12-17 NOTE — PROGRESS NOTES
Patient is here for MD f/u and cycle 2 of chemo. Patient received IV fluids and a k-rider on Thursday. Since that time, patient c/o lower back pain since sitting in the chair for several hours. She is taking Hydrocodone as needed. Appetite has improved.  En

## 2019-12-20 NOTE — PROGRESS NOTES
Nutrition F/U Note     Patient Nichelle Alamo  YOB: 1950  Medical Record Number: [id number]      Account Number: [de-identified]  Dietitian: Jaya Birmingham RD     Date of visit: 12/17/19     Diet Rx: high protein/calorie; fiber controlled

## 2019-12-26 NOTE — TELEPHONE ENCOUNTER
Spoke with patient regarding her potassium Rx. Clarified patient is to take 20 meq once daily as she had been taking before. Will recheck labs on Tuesday.

## 2019-12-31 NOTE — PROGRESS NOTES
Pt here for C 2 D 15 Gemzar/abraxane.   Arrives Ambulating independently, accompanied by Self and Spouse           Patient reports possible pregnancy since last therapy cycle: Not Applicable    Modifications in dose or schedule: No     Frequency of blood re

## 2019-12-31 NOTE — PROGRESS NOTES
Patient is here for MD f/u and cycle 2 day 15 of chemo. Patient c/o intermittent diarrhea and constipation. Patient has ongoing back pain. She has not been sleeping well at night because of the pain.  Norco alleviates the pain but makes patient very sleepy

## 2019-12-31 NOTE — PROGRESS NOTES
[facility]    PATIENT'S NAME: Kenroy Azul   ATTENDING PHYSICIAN: Oneil Reed M.D.    PATIENT ACCOUNT #: [de-identified] LOCATION: [address] [place] [address] RECORD #: [id number] YOB: 1950   DATE OF SERVICE: 12/17/2019       TANESHA mg daily; ondansetron 8 mg q.8 h. p.r.n.; potassium chloride 20 mEq daily; prochlorperazine 10 mg q.6 h. p.r.n.; rivaroxaban 20 mg daily; tramadol 15 mg q.8 h. p.r.n.       PHYSICAL EXAMINATION:    GENERAL:  She is a well-appearing female in no acute distre Laverne Segovia M.D.  d: 12/30/2019 17:55:32  t: 12/31/2019 03:03:09  [facility] [id number]  UU/    cc: Dr. Mookie Reeder.  Yohana Burciaga

## 2020-01-01 ENCOUNTER — APPOINTMENT (OUTPATIENT)
Dept: CT IMAGING | Facility: HOSPITAL | Age: 70
DRG: 948 | End: 2020-01-01
Attending: INTERNAL MEDICINE
Payer: MEDICARE

## 2020-01-01 ENCOUNTER — TELEPHONE (OUTPATIENT)
Dept: HEMATOLOGY/ONCOLOGY | Facility: HOSPITAL | Age: 70
End: 2020-01-01

## 2020-01-01 ENCOUNTER — NURSE ONLY (OUTPATIENT)
Dept: HEMATOLOGY/ONCOLOGY | Facility: HOSPITAL | Age: 70
End: 2020-01-01
Attending: INTERNAL MEDICINE
Payer: MEDICARE

## 2020-01-01 ENCOUNTER — HOSPITAL ENCOUNTER (EMERGENCY)
Facility: HOSPITAL | Age: 70
Discharge: HOME | End: 2020-01-01
Attending: EMERGENCY MEDICINE
Payer: MEDICARE

## 2020-01-01 ENCOUNTER — DIETICIAN VISIT (OUTPATIENT)
Dept: HEMATOLOGY/ONCOLOGY | Facility: HOSPITAL | Age: 70
End: 2020-01-01

## 2020-01-01 ENCOUNTER — APPOINTMENT (OUTPATIENT)
Dept: GENERAL RADIOLOGY | Facility: HOSPITAL | Age: 70
End: 2020-01-01
Attending: UROLOGY
Payer: MEDICARE

## 2020-01-01 ENCOUNTER — ANESTHESIA (OUTPATIENT)
Dept: SURGERY | Facility: HOSPITAL | Age: 70
End: 2020-01-01
Payer: MEDICARE

## 2020-01-01 ENCOUNTER — APPOINTMENT (OUTPATIENT)
Dept: CT IMAGING | Facility: HOSPITAL | Age: 70
End: 2020-01-01
Attending: EMERGENCY MEDICINE
Payer: MEDICARE

## 2020-01-01 ENCOUNTER — ANESTHESIA (OUTPATIENT)
Dept: ENDOSCOPY | Facility: HOSPITAL | Age: 70
End: 2020-01-01
Payer: MEDICARE

## 2020-01-01 ENCOUNTER — NURSE NAVIGATOR ENCOUNTER (OUTPATIENT)
Dept: HEMATOLOGY/ONCOLOGY | Facility: HOSPITAL | Age: 70
End: 2020-01-01

## 2020-01-01 ENCOUNTER — HOSPITAL ENCOUNTER (OUTPATIENT)
Facility: HOSPITAL | Age: 70
Setting detail: HOSPITAL OUTPATIENT SURGERY
Discharge: HOME | End: 2020-01-01
Attending: UROLOGY | Admitting: UROLOGY
Payer: MEDICARE

## 2020-01-01 ENCOUNTER — HOSPITAL ENCOUNTER (INPATIENT)
Facility: HOSPITAL | Age: 70
LOS: 2 days | Discharge: HOME | DRG: 948 | End: 2020-01-01
Attending: INTERNAL MEDICINE | Admitting: INTERNAL MEDICINE
Payer: MEDICARE

## 2020-01-01 ENCOUNTER — HOSPITAL ENCOUNTER (OUTPATIENT)
Facility: HOSPITAL | Age: 70
Setting detail: HOSPITAL OUTPATIENT SURGERY
Discharge: HOME | End: 2020-01-01
Attending: INTERNAL MEDICINE | Admitting: INTERNAL MEDICINE
Payer: MEDICARE

## 2020-01-01 ENCOUNTER — ANESTHESIA EVENT (OUTPATIENT)
Dept: SURGERY | Facility: HOSPITAL | Age: 70
End: 2020-01-01
Payer: MEDICARE

## 2020-01-01 ENCOUNTER — ANESTHESIA EVENT (OUTPATIENT)
Dept: ENDOSCOPY | Facility: HOSPITAL | Age: 70
End: 2020-01-01
Payer: MEDICARE

## 2020-01-01 ENCOUNTER — APPOINTMENT (OUTPATIENT)
Dept: LAB | Facility: HOSPITAL | Age: 70
End: 2020-01-01
Payer: MEDICARE

## 2020-01-01 ENCOUNTER — SNF/IP PROF CHARGE ONLY (OUTPATIENT)
Dept: HEMATOLOGY/ONCOLOGY | Facility: HOSPITAL | Age: 70
End: 2020-01-01

## 2020-01-01 VITALS
TEMPERATURE: 98 F | SYSTOLIC BLOOD PRESSURE: 189 MMHG | BODY MASS INDEX: 23.54 KG/M2 | RESPIRATION RATE: 17 BRPM | OXYGEN SATURATION: 98 % | DIASTOLIC BLOOD PRESSURE: 97 MMHG | WEIGHT: 150 LBS | HEART RATE: 77 BPM | HEIGHT: 67 IN

## 2020-01-01 VITALS
BODY MASS INDEX: 24.2 KG/M2 | DIASTOLIC BLOOD PRESSURE: 85 MMHG | TEMPERATURE: 97 F | HEIGHT: 67.09 IN | HEART RATE: 98 BPM | RESPIRATION RATE: 18 BRPM | WEIGHT: 154.19 LBS | OXYGEN SATURATION: 97 % | SYSTOLIC BLOOD PRESSURE: 122 MMHG

## 2020-01-01 VITALS
TEMPERATURE: 98 F | WEIGHT: 149.06 LBS | HEIGHT: 67 IN | BODY MASS INDEX: 23.39 KG/M2 | OXYGEN SATURATION: 100 % | SYSTOLIC BLOOD PRESSURE: 180 MMHG | HEART RATE: 75 BPM | DIASTOLIC BLOOD PRESSURE: 96 MMHG | RESPIRATION RATE: 18 BRPM

## 2020-01-01 VITALS
DIASTOLIC BLOOD PRESSURE: 91 MMHG | TEMPERATURE: 98 F | HEIGHT: 67 IN | BODY MASS INDEX: 23.23 KG/M2 | HEART RATE: 76 BPM | RESPIRATION RATE: 20 BRPM | WEIGHT: 148 LBS | SYSTOLIC BLOOD PRESSURE: 162 MMHG | OXYGEN SATURATION: 97 %

## 2020-01-01 VITALS
HEIGHT: 67.09 IN | RESPIRATION RATE: 16 BRPM | OXYGEN SATURATION: 100 % | SYSTOLIC BLOOD PRESSURE: 103 MMHG | DIASTOLIC BLOOD PRESSURE: 67 MMHG | BODY MASS INDEX: 22.91 KG/M2 | TEMPERATURE: 98 F | HEART RATE: 91 BPM | WEIGHT: 146 LBS

## 2020-01-01 VITALS
SYSTOLIC BLOOD PRESSURE: 114 MMHG | OXYGEN SATURATION: 98 % | BODY MASS INDEX: 23 KG/M2 | WEIGHT: 144.13 LBS | RESPIRATION RATE: 16 BRPM | TEMPERATURE: 99 F | DIASTOLIC BLOOD PRESSURE: 61 MMHG | HEART RATE: 86 BPM

## 2020-01-01 VITALS
TEMPERATURE: 98 F | OXYGEN SATURATION: 97 % | RESPIRATION RATE: 18 BRPM | WEIGHT: 148.38 LBS | DIASTOLIC BLOOD PRESSURE: 69 MMHG | HEIGHT: 67.09 IN | HEART RATE: 92 BPM | BODY MASS INDEX: 23.29 KG/M2 | SYSTOLIC BLOOD PRESSURE: 101 MMHG

## 2020-01-01 VITALS
RESPIRATION RATE: 18 BRPM | DIASTOLIC BLOOD PRESSURE: 86 MMHG | SYSTOLIC BLOOD PRESSURE: 134 MMHG | TEMPERATURE: 97 F | HEART RATE: 98 BPM | OXYGEN SATURATION: 100 %

## 2020-01-01 DIAGNOSIS — C77.2 SECONDARY MALIGNANT NEOPLASM OF RETROPERITONEAL LYMPH NODES (HCC): Primary | ICD-10-CM

## 2020-01-01 DIAGNOSIS — C78.7 SECONDARY ADENOCARCINOMA OF LIVER (HCC): ICD-10-CM

## 2020-01-01 DIAGNOSIS — C25.0 MALIGNANT NEOPLASM OF HEAD OF PANCREAS (HCC): ICD-10-CM

## 2020-01-01 DIAGNOSIS — C25.0 MALIGNANT NEOPLASM OF HEAD OF PANCREAS (HCC): Primary | ICD-10-CM

## 2020-01-01 DIAGNOSIS — N13.5 OBSTRUCTION OF LEFT URETER: Primary | ICD-10-CM

## 2020-01-01 DIAGNOSIS — Z51.11 CHEMOTHERAPY MANAGEMENT, ENCOUNTER FOR: ICD-10-CM

## 2020-01-01 DIAGNOSIS — G89.3 NEOPLASM RELATED PAIN: ICD-10-CM

## 2020-01-01 DIAGNOSIS — C77.2 SECONDARY MALIGNANT NEOPLASM OF RETROPERITONEAL LYMPH NODES (HCC): ICD-10-CM

## 2020-01-01 DIAGNOSIS — G89.3 NEOPLASM RELATED PAIN: Primary | ICD-10-CM

## 2020-01-01 DIAGNOSIS — K59.00 CONSTIPATION, UNSPECIFIED CONSTIPATION TYPE: Primary | ICD-10-CM

## 2020-01-01 DIAGNOSIS — Z71.89 OTHER SPECIFIED COUNSELING: ICD-10-CM

## 2020-01-01 DIAGNOSIS — N13.5 OBSTRUCTION OF LEFT URETER: ICD-10-CM

## 2020-01-01 DIAGNOSIS — K59.01 SLOW TRANSIT CONSTIPATION: ICD-10-CM

## 2020-01-01 DIAGNOSIS — R11.2 NON-INTRACTABLE VOMITING WITH NAUSEA, UNSPECIFIED VOMITING TYPE: ICD-10-CM

## 2020-01-01 DIAGNOSIS — R11.15 INTRACTABLE CYCLICAL VOMITING WITH NAUSEA: Primary | ICD-10-CM

## 2020-01-01 DIAGNOSIS — K86.89 MASS OF PANCREAS: ICD-10-CM

## 2020-01-01 LAB
ALBUMIN SERPL-MCNC: 2.3 G/DL (ref 3.4–5)
ALBUMIN SERPL-MCNC: 2.7 G/DL (ref 3.4–5)
ALBUMIN SERPL-MCNC: 3.1 G/DL (ref 3.4–5)
ALBUMIN SERPL-MCNC: 3.2 G/DL (ref 3.4–5)
ALBUMIN SERPL-MCNC: 3.3 G/DL (ref 3.4–5)
ALBUMIN/GLOB SERPL: 0.7 {RATIO} (ref 1–2)
ALBUMIN/GLOB SERPL: 0.8 {RATIO} (ref 1–2)
ALBUMIN/GLOB SERPL: 0.9 {RATIO} (ref 1–2)
ALP LIVER SERPL-CCNC: 108 U/L (ref 55–142)
ALP LIVER SERPL-CCNC: 129 U/L (ref 55–142)
ALP LIVER SERPL-CCNC: 131 U/L (ref 55–142)
ALP LIVER SERPL-CCNC: 144 U/L (ref 55–142)
ALP LIVER SERPL-CCNC: 264 U/L (ref 55–142)
ALT SERPL-CCNC: 191 U/L (ref 13–56)
ALT SERPL-CCNC: 237 U/L (ref 13–56)
ALT SERPL-CCNC: 29 U/L (ref 13–56)
ALT SERPL-CCNC: 335 U/L (ref 13–56)
ALT SERPL-CCNC: 62 U/L (ref 13–56)
ANION GAP SERPL CALC-SCNC: 10 MMOL/L (ref 0–18)
ANION GAP SERPL CALC-SCNC: 6 MMOL/L (ref 0–18)
ANION GAP SERPL CALC-SCNC: 6 MMOL/L (ref 0–18)
ANION GAP SERPL CALC-SCNC: 7 MMOL/L (ref 0–18)
ANION GAP SERPL CALC-SCNC: 7 MMOL/L (ref 0–18)
ANION GAP SERPL CALC-SCNC: 8 MMOL/L (ref 0–18)
AST SERPL-CCNC: 21 U/L (ref 15–37)
AST SERPL-CCNC: 237 U/L (ref 15–37)
AST SERPL-CCNC: 247 U/L (ref 15–37)
AST SERPL-CCNC: 26 U/L (ref 15–37)
AST SERPL-CCNC: 96 U/L (ref 15–37)
ATRIAL RATE: 74 BPM
BASOPHILS # BLD AUTO: 0.01 X10(3) UL (ref 0–0.2)
BASOPHILS # BLD AUTO: 0.02 X10(3) UL (ref 0–0.2)
BASOPHILS # BLD AUTO: 0.04 X10(3) UL (ref 0–0.2)
BASOPHILS NFR BLD AUTO: 0.1 %
BASOPHILS NFR BLD AUTO: 0.2 %
BASOPHILS NFR BLD AUTO: 0.2 %
BASOPHILS NFR BLD AUTO: 0.6 %
BASOPHILS NFR BLD AUTO: 0.9 %
BILIRUB SERPL-MCNC: 0.4 MG/DL (ref 0.1–2)
BILIRUB SERPL-MCNC: 0.5 MG/DL (ref 0.1–2)
BILIRUB SERPL-MCNC: 0.6 MG/DL (ref 0.1–2)
BILIRUB SERPL-MCNC: 0.9 MG/DL (ref 0.1–2)
BILIRUB SERPL-MCNC: 1 MG/DL (ref 0.1–2)
BILIRUB UR QL STRIP.AUTO: NEGATIVE
BUN BLD-MCNC: 19 MG/DL (ref 7–18)
BUN BLD-MCNC: 20 MG/DL (ref 7–18)
BUN BLD-MCNC: 20 MG/DL (ref 7–18)
BUN BLD-MCNC: 22 MG/DL (ref 7–18)
BUN BLD-MCNC: 30 MG/DL (ref 7–18)
BUN BLD-MCNC: 31 MG/DL (ref 7–18)
BUN/CREAT SERPL: 14.1 (ref 10–20)
BUN/CREAT SERPL: 16.4 (ref 10–20)
BUN/CREAT SERPL: 19.8 (ref 10–20)
BUN/CREAT SERPL: 23.8 (ref 10–20)
BUN/CREAT SERPL: 24.4 (ref 10–20)
BUN/CREAT SERPL: 26.5 (ref 10–20)
CALCIUM BLD-MCNC: 8.6 MG/DL (ref 8.5–10.1)
CALCIUM BLD-MCNC: 8.8 MG/DL (ref 8.5–10.1)
CALCIUM BLD-MCNC: 9.1 MG/DL (ref 8.5–10.1)
CALCIUM BLD-MCNC: 9.4 MG/DL (ref 8.5–10.1)
CALCIUM BLD-MCNC: 9.5 MG/DL (ref 8.5–10.1)
CALCIUM BLD-MCNC: 9.8 MG/DL (ref 8.5–10.1)
CANCER AG19-9 SERPL-ACNC: 2044.3 U/ML (ref ?–37)
CANCER AG19-9 SERPL-ACNC: 4137 U/ML (ref ?–37)
CEA SERPL-MCNC: 16.2 NG/ML (ref ?–5)
CEA SERPL-MCNC: 17.2 NG/ML (ref ?–5)
CEA SERPL-MCNC: 32.3 NG/ML (ref ?–5)
CHLORIDE SERPL-SCNC: 100 MMOL/L (ref 98–112)
CHLORIDE SERPL-SCNC: 102 MMOL/L (ref 98–112)
CHLORIDE SERPL-SCNC: 102 MMOL/L (ref 98–112)
CHLORIDE SERPL-SCNC: 103 MMOL/L (ref 98–112)
CHLORIDE SERPL-SCNC: 103 MMOL/L (ref 98–112)
CHLORIDE SERPL-SCNC: 105 MMOL/L (ref 98–112)
CO2 SERPL-SCNC: 20 MMOL/L (ref 21–32)
CO2 SERPL-SCNC: 22 MMOL/L (ref 21–32)
CO2 SERPL-SCNC: 24 MMOL/L (ref 21–32)
CO2 SERPL-SCNC: 24 MMOL/L (ref 21–32)
CO2 SERPL-SCNC: 26 MMOL/L (ref 21–32)
CO2 SERPL-SCNC: 27 MMOL/L (ref 21–32)
COLOR UR AUTO: YELLOW
CREAT BLD-MCNC: 0.9 MG/DL (ref 0.55–1.02)
CREAT BLD-MCNC: 1.01 MG/DL (ref 0.55–1.02)
CREAT BLD-MCNC: 1.13 MG/DL (ref 0.55–1.02)
CREAT BLD-MCNC: 1.22 MG/DL (ref 0.55–1.02)
CREAT BLD-MCNC: 1.3 MG/DL (ref 0.55–1.02)
CREAT BLD-MCNC: 1.35 MG/DL (ref 0.55–1.02)
DEPRECATED RDW RBC AUTO: 48.8 FL (ref 35.1–46.3)
DEPRECATED RDW RBC AUTO: 53.9 FL (ref 35.1–46.3)
DEPRECATED RDW RBC AUTO: 54 FL (ref 35.1–46.3)
DEPRECATED RDW RBC AUTO: 55.9 FL (ref 35.1–46.3)
DEPRECATED RDW RBC AUTO: 59 FL (ref 35.1–46.3)
EOSINOPHIL # BLD AUTO: 0.01 X10(3) UL (ref 0–0.7)
EOSINOPHIL # BLD AUTO: 0.05 X10(3) UL (ref 0–0.7)
EOSINOPHIL # BLD AUTO: 0.07 X10(3) UL (ref 0–0.7)
EOSINOPHIL # BLD AUTO: 0.11 X10(3) UL (ref 0–0.7)
EOSINOPHIL # BLD AUTO: 0.15 X10(3) UL (ref 0–0.7)
EOSINOPHIL NFR BLD AUTO: 0.1 %
EOSINOPHIL NFR BLD AUTO: 0.9 %
EOSINOPHIL NFR BLD AUTO: 1.2 %
EOSINOPHIL NFR BLD AUTO: 3.1 %
EOSINOPHIL NFR BLD AUTO: 3.3 %
ERYTHROCYTE [DISTWIDTH] IN BLOOD BY AUTOMATED COUNT: 14 % (ref 11–15)
ERYTHROCYTE [DISTWIDTH] IN BLOOD BY AUTOMATED COUNT: 14.7 % (ref 11–15)
ERYTHROCYTE [DISTWIDTH] IN BLOOD BY AUTOMATED COUNT: 15 % (ref 11–15)
ERYTHROCYTE [DISTWIDTH] IN BLOOD BY AUTOMATED COUNT: 15.1 % (ref 11–15)
ERYTHROCYTE [DISTWIDTH] IN BLOOD BY AUTOMATED COUNT: 15.4 % (ref 11–15)
GLOBULIN PLAS-MCNC: 3.3 G/DL (ref 2.8–4.4)
GLOBULIN PLAS-MCNC: 3.4 G/DL (ref 2.8–4.4)
GLOBULIN PLAS-MCNC: 3.6 G/DL (ref 2.8–4.4)
GLOBULIN PLAS-MCNC: 3.7 G/DL (ref 2.8–4.4)
GLOBULIN PLAS-MCNC: 3.8 G/DL (ref 2.8–4.4)
GLUCOSE BLD-MCNC: 114 MG/DL (ref 70–99)
GLUCOSE BLD-MCNC: 118 MG/DL (ref 70–99)
GLUCOSE BLD-MCNC: 91 MG/DL (ref 70–99)
GLUCOSE BLD-MCNC: 94 MG/DL (ref 70–99)
GLUCOSE BLD-MCNC: 94 MG/DL (ref 70–99)
GLUCOSE BLD-MCNC: 98 MG/DL (ref 70–99)
GLUCOSE UR STRIP.AUTO-MCNC: NEGATIVE MG/DL
HAV IGM SER QL: 2 MG/DL (ref 1.6–2.6)
HCT VFR BLD AUTO: 32.3 % (ref 35–48)
HCT VFR BLD AUTO: 32.5 % (ref 35–48)
HCT VFR BLD AUTO: 32.9 % (ref 35–48)
HCT VFR BLD AUTO: 33.8 % (ref 35–48)
HCT VFR BLD AUTO: 34.7 % (ref 35–48)
HGB BLD-MCNC: 10.6 G/DL (ref 12–16)
HGB BLD-MCNC: 11 G/DL (ref 12–16)
HGB BLD-MCNC: 11.2 G/DL (ref 12–16)
HGB BLD-MCNC: 11.3 G/DL (ref 12–16)
HGB BLD-MCNC: 11.6 G/DL (ref 12–16)
HYALINE CASTS #/AREA URNS AUTO: PRESENT /LPF
IMM GRANULOCYTES # BLD AUTO: 0.01 X10(3) UL (ref 0–1)
IMM GRANULOCYTES # BLD AUTO: 0.01 X10(3) UL (ref 0–1)
IMM GRANULOCYTES # BLD AUTO: 0.02 X10(3) UL (ref 0–1)
IMM GRANULOCYTES # BLD AUTO: 0.03 X10(3) UL (ref 0–1)
IMM GRANULOCYTES # BLD AUTO: 0.09 X10(3) UL (ref 0–1)
IMM GRANULOCYTES NFR BLD: 0.2 %
IMM GRANULOCYTES NFR BLD: 0.3 %
IMM GRANULOCYTES NFR BLD: 0.4 %
IMM GRANULOCYTES NFR BLD: 0.6 %
IMM GRANULOCYTES NFR BLD: 0.7 %
LIPASE SERPL-CCNC: 117 U/L (ref 73–393)
LYMPHOCYTES # BLD AUTO: 0.56 X10(3) UL (ref 1–4)
LYMPHOCYTES # BLD AUTO: 0.57 X10(3) UL (ref 1–4)
LYMPHOCYTES # BLD AUTO: 0.67 X10(3) UL (ref 1–4)
LYMPHOCYTES # BLD AUTO: 0.92 X10(3) UL (ref 1–4)
LYMPHOCYTES # BLD AUTO: 1.15 X10(3) UL (ref 1–4)
LYMPHOCYTES NFR BLD AUTO: 15.2 %
LYMPHOCYTES NFR BLD AUTO: 16 %
LYMPHOCYTES NFR BLD AUTO: 25 %
LYMPHOCYTES NFR BLD AUTO: 4.8 %
LYMPHOCYTES NFR BLD AUTO: 9.8 %
M PROTEIN MFR SERPL ELPH: 5.7 G/DL (ref 6.4–8.2)
M PROTEIN MFR SERPL ELPH: 6 G/DL (ref 6.4–8.2)
M PROTEIN MFR SERPL ELPH: 6.7 G/DL (ref 6.4–8.2)
M PROTEIN MFR SERPL ELPH: 6.9 G/DL (ref 6.4–8.2)
M PROTEIN MFR SERPL ELPH: 7.1 G/DL (ref 6.4–8.2)
MCH RBC QN AUTO: 32.4 PG (ref 26–34)
MCH RBC QN AUTO: 33 PG (ref 26–34)
MCH RBC QN AUTO: 33.4 PG (ref 26–34)
MCH RBC QN AUTO: 33.7 PG (ref 26–34)
MCH RBC QN AUTO: 33.9 PG (ref 26–34)
MCHC RBC AUTO-ENTMCNC: 32.6 G/DL (ref 31–37)
MCHC RBC AUTO-ENTMCNC: 32.6 G/DL (ref 31–37)
MCHC RBC AUTO-ENTMCNC: 34 G/DL (ref 31–37)
MCHC RBC AUTO-ENTMCNC: 34.1 G/DL (ref 31–37)
MCHC RBC AUTO-ENTMCNC: 34.3 G/DL (ref 31–37)
MCV RBC AUTO: 101.5 FL (ref 80–100)
MCV RBC AUTO: 103.8 FL (ref 80–100)
MCV RBC AUTO: 94.4 FL (ref 80–100)
MCV RBC AUTO: 98.2 FL (ref 80–100)
MCV RBC AUTO: 99.1 FL (ref 80–100)
MONOCYTES # BLD AUTO: 0.08 X10(3) UL (ref 0.1–1)
MONOCYTES # BLD AUTO: 0.12 X10(3) UL (ref 0.1–1)
MONOCYTES # BLD AUTO: 0.31 X10(3) UL (ref 0.1–1)
MONOCYTES # BLD AUTO: 0.43 X10(3) UL (ref 0.1–1)
MONOCYTES # BLD AUTO: 0.53 X10(3) UL (ref 0.1–1)
MONOCYTES NFR BLD AUTO: 1.4 %
MONOCYTES NFR BLD AUTO: 14.8 %
MONOCYTES NFR BLD AUTO: 2 %
MONOCYTES NFR BLD AUTO: 2.2 %
MONOCYTES NFR BLD AUTO: 9.3 %
NEUTROPHILS # BLD AUTO: 12.86 X10 (3) UL (ref 1.5–7.7)
NEUTROPHILS # BLD AUTO: 12.86 X10(3) UL (ref 1.5–7.7)
NEUTROPHILS # BLD AUTO: 2.33 X10 (3) UL (ref 1.5–7.7)
NEUTROPHILS # BLD AUTO: 2.33 X10(3) UL (ref 1.5–7.7)
NEUTROPHILS # BLD AUTO: 2.8 X10 (3) UL (ref 1.5–7.7)
NEUTROPHILS # BLD AUTO: 2.8 X10(3) UL (ref 1.5–7.7)
NEUTROPHILS # BLD AUTO: 4.93 X10 (3) UL (ref 1.5–7.7)
NEUTROPHILS # BLD AUTO: 4.93 X10(3) UL (ref 1.5–7.7)
NEUTROPHILS # BLD AUTO: 4.97 X10 (3) UL (ref 1.5–7.7)
NEUTROPHILS # BLD AUTO: 4.97 X10(3) UL (ref 1.5–7.7)
NEUTROPHILS NFR BLD AUTO: 60.8 %
NEUTROPHILS NFR BLD AUTO: 65.2 %
NEUTROPHILS NFR BLD AUTO: 81.2 %
NEUTROPHILS NFR BLD AUTO: 87.3 %
NEUTROPHILS NFR BLD AUTO: 92.2 %
NITRITE UR QL STRIP.AUTO: NEGATIVE
OSMOLALITY SERPL CALC.SUM OF ELEC: 278 MOSM/KG (ref 275–295)
OSMOLALITY SERPL CALC.SUM OF ELEC: 279 MOSM/KG (ref 275–295)
OSMOLALITY SERPL CALC.SUM OF ELEC: 282 MOSM/KG (ref 275–295)
OSMOLALITY SERPL CALC.SUM OF ELEC: 282 MOSM/KG (ref 275–295)
OSMOLALITY SERPL CALC.SUM OF ELEC: 283 MOSM/KG (ref 275–295)
OSMOLALITY SERPL CALC.SUM OF ELEC: 283 MOSM/KG (ref 275–295)
P AXIS: 69 DEGREES
P-R INTERVAL: 170 MS
PATIENT FASTING Y/N/NP: NO
PATIENT FASTING Y/N/NP: NO
PH UR STRIP.AUTO: 6 [PH] (ref 4.5–8)
PLATELET # BLD AUTO: 161 10(3)UL (ref 150–450)
PLATELET # BLD AUTO: 201 10(3)UL (ref 150–450)
PLATELET # BLD AUTO: 253 10(3)UL (ref 150–450)
PLATELET # BLD AUTO: 271 10(3)UL (ref 150–450)
PLATELET # BLD AUTO: 285 10(3)UL (ref 150–450)
POTASSIUM SERPL-SCNC: 3.3 MMOL/L (ref 3.5–5.1)
POTASSIUM SERPL-SCNC: 3.3 MMOL/L (ref 3.5–5.1)
POTASSIUM SERPL-SCNC: 3.5 MMOL/L (ref 3.5–5.1)
POTASSIUM SERPL-SCNC: 3.5 MMOL/L (ref 3.5–5.1)
POTASSIUM SERPL-SCNC: 3.6 MMOL/L (ref 3.5–5.1)
POTASSIUM SERPL-SCNC: 4.2 MMOL/L (ref 3.5–5.1)
POTASSIUM SERPL-SCNC: 4.3 MMOL/L (ref 3.5–5.1)
POTASSIUM SERPL-SCNC: 4.3 MMOL/L (ref 3.5–5.1)
PROT UR STRIP.AUTO-MCNC: 30 MG/DL
Q-T INTERVAL: 410 MS
QRS DURATION: 84 MS
QTC CALCULATION (BEZET): 455 MS
R AXIS: 5 DEGREES
RBC # BLD AUTO: 3.13 X10(6)UL (ref 3.8–5.3)
RBC # BLD AUTO: 3.26 X10(6)UL (ref 3.8–5.3)
RBC # BLD AUTO: 3.35 X10(6)UL (ref 3.8–5.3)
RBC # BLD AUTO: 3.42 X10(6)UL (ref 3.8–5.3)
RBC # BLD AUTO: 3.58 X10(6)UL (ref 3.8–5.3)
RBC #/AREA URNS AUTO: >10 /HPF
SODIUM SERPL-SCNC: 133 MMOL/L (ref 136–145)
SODIUM SERPL-SCNC: 135 MMOL/L (ref 136–145)
SODIUM SERPL-SCNC: 135 MMOL/L (ref 136–145)
SP GR UR STRIP.AUTO: 1.01 (ref 1–1.03)
T AXIS: 71 DEGREES
TSI SER-ACNC: 0.61 MIU/ML (ref 0.36–3.74)
UROBILINOGEN UR STRIP.AUTO-MCNC: <2 MG/DL
VENTRICULAR RATE: 74 BPM
WBC # BLD AUTO: 14 X10(3) UL (ref 4–11)
WBC # BLD AUTO: 3.6 X10(3) UL (ref 4–11)
WBC # BLD AUTO: 4.6 X10(3) UL (ref 4–11)
WBC # BLD AUTO: 5.7 X10(3) UL (ref 4–11)
WBC # BLD AUTO: 6.1 X10(3) UL (ref 4–11)

## 2020-01-01 PROCEDURE — 93005 ELECTROCARDIOGRAM TRACING: CPT

## 2020-01-01 PROCEDURE — 0T778DZ DILATION OF LEFT URETER WITH INTRALUMINAL DEVICE, VIA NATURAL OR ARTIFICIAL OPENING ENDOSCOPIC: ICD-10-PCS | Performed by: UROLOGY

## 2020-01-01 PROCEDURE — 99215 OFFICE O/P EST HI 40 MIN: CPT | Performed by: CLINICAL NURSE SPECIALIST

## 2020-01-01 PROCEDURE — 99214 OFFICE O/P EST MOD 30 MIN: CPT | Performed by: INTERNAL MEDICINE

## 2020-01-01 PROCEDURE — 99284 EMERGENCY DEPT VISIT MOD MDM: CPT

## 2020-01-01 PROCEDURE — 99239 HOSP IP/OBS DSCHRG MGMT >30: CPT | Performed by: INTERNAL MEDICINE

## 2020-01-01 PROCEDURE — 96376 TX/PRO/DX INJ SAME DRUG ADON: CPT

## 2020-01-01 PROCEDURE — 96374 THER/PROPH/DIAG INJ IV PUSH: CPT

## 2020-01-01 PROCEDURE — 99213 OFFICE O/P EST LOW 20 MIN: CPT | Performed by: NURSE PRACTITIONER

## 2020-01-01 PROCEDURE — 85025 COMPLETE CBC W/AUTO DIFF WBC: CPT

## 2020-01-01 PROCEDURE — 82378 CARCINOEMBRYONIC ANTIGEN: CPT | Performed by: INTERNAL MEDICINE

## 2020-01-01 PROCEDURE — 36415 COLL VENOUS BLD VENIPUNCTURE: CPT

## 2020-01-01 PROCEDURE — 80053 COMPREHEN METABOLIC PANEL: CPT

## 2020-01-01 PROCEDURE — 81001 URINALYSIS AUTO W/SCOPE: CPT | Performed by: EMERGENCY MEDICINE

## 2020-01-01 PROCEDURE — 99232 SBSQ HOSP IP/OBS MODERATE 35: CPT | Performed by: INTERNAL MEDICINE

## 2020-01-01 PROCEDURE — 81001 URINALYSIS AUTO W/SCOPE: CPT

## 2020-01-01 PROCEDURE — 0TP98DZ REMOVAL OF INTRALUMINAL DEVICE FROM URETER, VIA NATURAL OR ARTIFICIAL OPENING ENDOSCOPIC: ICD-10-PCS | Performed by: UROLOGY

## 2020-01-01 PROCEDURE — 96413 CHEMO IV INFUSION 1 HR: CPT

## 2020-01-01 PROCEDURE — 93010 ELECTROCARDIOGRAM REPORT: CPT | Performed by: INTERNAL MEDICINE

## 2020-01-01 PROCEDURE — 0F9G8ZX DRAINAGE OF PANCREAS, VIA NATURAL OR ARTIFICIAL OPENING ENDOSCOPIC, DIAGNOSTIC: ICD-10-PCS | Performed by: INTERNAL MEDICINE

## 2020-01-01 PROCEDURE — 99153 MOD SED SAME PHYS/QHP EA: CPT | Performed by: INTERNAL MEDICINE

## 2020-01-01 PROCEDURE — 99152 MOD SED SAME PHYS/QHP 5/>YRS: CPT | Performed by: INTERNAL MEDICINE

## 2020-01-01 PROCEDURE — 96417 CHEMO IV INFUS EACH ADDL SEQ: CPT

## 2020-01-01 PROCEDURE — 83690 ASSAY OF LIPASE: CPT | Performed by: EMERGENCY MEDICINE

## 2020-01-01 PROCEDURE — 96361 HYDRATE IV INFUSION ADD-ON: CPT

## 2020-01-01 PROCEDURE — 64530 N BLOCK INJ CELIAC PELUS: CPT | Performed by: INTERNAL MEDICINE

## 2020-01-01 PROCEDURE — 99223 1ST HOSP IP/OBS HIGH 75: CPT | Performed by: INTERNAL MEDICINE

## 2020-01-01 PROCEDURE — 83690 ASSAY OF LIPASE: CPT

## 2020-01-01 PROCEDURE — G9678 ONCOLOGY CARE MODEL SERVICE: HCPCS | Performed by: INTERNAL MEDICINE

## 2020-01-01 PROCEDURE — 82378 CARCINOEMBRYONIC ANTIGEN: CPT

## 2020-01-01 PROCEDURE — 85025 COMPLETE CBC W/AUTO DIFF WBC: CPT | Performed by: EMERGENCY MEDICINE

## 2020-01-01 PROCEDURE — 36593 DECLOT VASCULAR DEVICE: CPT

## 2020-01-01 PROCEDURE — 77012 CT SCAN FOR NEEDLE BIOPSY: CPT | Performed by: INTERNAL MEDICINE

## 2020-01-01 PROCEDURE — 74177 CT ABD & PELVIS W/CONTRAST: CPT | Performed by: EMERGENCY MEDICINE

## 2020-01-01 PROCEDURE — 88305 TISSUE EXAM BY PATHOLOGIST: CPT | Performed by: INTERNAL MEDICINE

## 2020-01-01 PROCEDURE — 36591 DRAW BLOOD OFF VENOUS DEVICE: CPT

## 2020-01-01 PROCEDURE — 86301 IMMUNOASSAY TUMOR CA 19-9: CPT

## 2020-01-01 PROCEDURE — 3E0T3BZ INTRODUCTION OF ANESTHETIC AGENT INTO PERIPHERAL NERVES AND PLEXI, PERCUTANEOUS APPROACH: ICD-10-PCS | Performed by: RADIOLOGY

## 2020-01-01 PROCEDURE — 96375 TX/PRO/DX INJ NEW DRUG ADDON: CPT

## 2020-01-01 PROCEDURE — 80053 COMPREHEN METABOLIC PANEL: CPT | Performed by: EMERGENCY MEDICINE

## 2020-01-01 DEVICE — STENT URET 6F 26CM WO GW INL: Type: IMPLANTABLE DEVICE | Status: FUNCTIONAL

## 2020-01-01 RX ORDER — ONDANSETRON 2 MG/ML
4 INJECTION INTRAMUSCULAR; INTRAVENOUS AS NEEDED
Status: DISCONTINUED | OUTPATIENT
Start: 2020-01-01 | End: 2020-01-01

## 2020-01-01 RX ORDER — DEXAMETHASONE 4 MG/1
2 TABLET ORAL 2 TIMES DAILY WITH MEALS
COMMUNITY
Start: 2020-01-01

## 2020-01-01 RX ORDER — METOCLOPRAMIDE 10 MG/1
10 TABLET ORAL 4 TIMES DAILY PRN
Qty: 60 TABLET | Refills: 3 | Status: SHIPPED | OUTPATIENT
Start: 2020-01-01

## 2020-01-01 RX ORDER — LIDOCAINE 50 MG/G
PATCH TOPICAL
COMMUNITY
Start: 2020-01-01

## 2020-01-01 RX ORDER — GABAPENTIN 300 MG/1
CAPSULE ORAL
COMMUNITY
Start: 2020-01-01

## 2020-01-01 RX ORDER — CEFAZOLIN SODIUM/WATER 2 G/20 ML
2 SYRINGE (ML) INTRAVENOUS ONCE
Status: COMPLETED | OUTPATIENT
Start: 2020-01-01 | End: 2020-01-01

## 2020-01-01 RX ORDER — MEPERIDINE HYDROCHLORIDE 25 MG/ML
12.5 INJECTION INTRAMUSCULAR; INTRAVENOUS; SUBCUTANEOUS AS NEEDED
Status: DISCONTINUED | OUTPATIENT
Start: 2020-01-01 | End: 2020-01-01

## 2020-01-01 RX ORDER — NALOXONE HYDROCHLORIDE 0.4 MG/ML
80 INJECTION, SOLUTION INTRAMUSCULAR; INTRAVENOUS; SUBCUTANEOUS AS NEEDED
Status: DISCONTINUED | OUTPATIENT
Start: 2020-01-01 | End: 2020-01-01

## 2020-01-01 RX ORDER — HYDROMORPHONE HYDROCHLORIDE 1 MG/ML
0.5 INJECTION, SOLUTION INTRAMUSCULAR; INTRAVENOUS; SUBCUTANEOUS EVERY 2 HOUR PRN
Status: DISCONTINUED | OUTPATIENT
Start: 2020-01-01 | End: 2020-01-01

## 2020-01-01 RX ORDER — POTASSIUM CHLORIDE 20 MEQ/1
40 TABLET, EXTENDED RELEASE ORAL EVERY 4 HOURS
Status: DISCONTINUED | OUTPATIENT
Start: 2020-01-01 | End: 2020-01-01

## 2020-01-01 RX ORDER — LIDOCAINE HYDROCHLORIDE 20 MG/ML
JELLY TOPICAL AS NEEDED
Status: DISCONTINUED | OUTPATIENT
Start: 2020-01-01 | End: 2020-01-01 | Stop reason: HOSPADM

## 2020-01-01 RX ORDER — HYDROMORPHONE HYDROCHLORIDE 4 MG/1
TABLET ORAL
COMMUNITY
Start: 2020-01-01

## 2020-01-01 RX ORDER — LORAZEPAM 0.5 MG/1
0.5 TABLET ORAL EVERY 6 HOURS PRN
Status: DISCONTINUED | OUTPATIENT
Start: 2020-01-01 | End: 2020-01-01

## 2020-01-01 RX ORDER — HYDROMORPHONE HYDROCHLORIDE 1 MG/ML
0.5 INJECTION, SOLUTION INTRAMUSCULAR; INTRAVENOUS; SUBCUTANEOUS EVERY 30 MIN PRN
Status: DISCONTINUED | OUTPATIENT
Start: 2020-01-01 | End: 2020-01-01

## 2020-01-01 RX ORDER — SODIUM CHLORIDE, SODIUM LACTATE, POTASSIUM CHLORIDE, CALCIUM CHLORIDE 600; 310; 30; 20 MG/100ML; MG/100ML; MG/100ML; MG/100ML
INJECTION, SOLUTION INTRAVENOUS CONTINUOUS
Status: DISCONTINUED | OUTPATIENT
Start: 2020-01-01 | End: 2020-01-01

## 2020-01-01 RX ORDER — POLYETHYLENE GLYCOL 3350 17 G/17G
17 POWDER, FOR SOLUTION ORAL DAILY PRN
Qty: 12 EACH | Refills: 0 | Status: SHIPPED | OUTPATIENT
Start: 2020-01-01 | End: 2020-01-01

## 2020-01-01 RX ORDER — HYDROMORPHONE HYDROCHLORIDE 4 MG/1
4 TABLET ORAL EVERY 4 HOURS PRN
Status: DISCONTINUED | OUTPATIENT
Start: 2020-01-01 | End: 2020-01-01

## 2020-01-01 RX ORDER — NALBUPHINE HCL 10 MG/ML
2.5 AMPUL (ML) INJECTION EVERY 4 HOURS PRN
Status: DISCONTINUED | OUTPATIENT
Start: 2020-01-01 | End: 2020-01-01

## 2020-01-01 RX ORDER — MORPHINE SULFATE 4 MG/ML
3 INJECTION, SOLUTION INTRAMUSCULAR; INTRAVENOUS ONCE
Status: CANCELLED
Start: 2020-01-01

## 2020-01-01 RX ORDER — OXYCODONE HYDROCHLORIDE 10 MG/1
10 TABLET ORAL EVERY 4 HOURS PRN
Qty: 120 TABLET | Refills: 0 | Status: SHIPPED | OUTPATIENT
Start: 2020-01-01

## 2020-01-01 RX ORDER — FAMOTIDINE 10 MG
10 TABLET ORAL 2 TIMES DAILY
Status: DISCONTINUED | OUTPATIENT
Start: 2020-01-01 | End: 2020-01-01

## 2020-01-01 RX ORDER — FENTANYL 25 UG/H
1 PATCH TRANSDERMAL
Qty: 10 PATCH | Refills: 0 | Status: SHIPPED | OUTPATIENT
Start: 2020-01-01 | End: 2020-01-01 | Stop reason: ALTCHOICE

## 2020-01-01 RX ORDER — ONDANSETRON 2 MG/ML
4 INJECTION INTRAMUSCULAR; INTRAVENOUS ONCE
Status: COMPLETED | OUTPATIENT
Start: 2020-01-01 | End: 2020-01-01

## 2020-01-01 RX ORDER — HYDROMORPHONE HYDROCHLORIDE 2 MG/1
TABLET ORAL EVERY 4 HOURS PRN
Status: ON HOLD | COMMUNITY
End: 2020-01-01

## 2020-01-01 RX ORDER — NALOXONE HYDROCHLORIDE 0.4 MG/ML
0.08 INJECTION, SOLUTION INTRAMUSCULAR; INTRAVENOUS; SUBCUTANEOUS
Status: DISCONTINUED | OUTPATIENT
Start: 2020-01-01 | End: 2020-01-01

## 2020-01-01 RX ORDER — DEXAMETHASONE SODIUM PHOSPHATE 4 MG/ML
4 VIAL (ML) INJECTION ONCE
Status: COMPLETED | OUTPATIENT
Start: 2020-01-01 | End: 2020-01-01

## 2020-01-01 RX ORDER — LORAZEPAM 0.5 MG/1
0.5 TABLET ORAL EVERY 6 HOURS PRN
Qty: 30 TABLET | Refills: 0 | Status: SHIPPED | OUTPATIENT
Start: 2020-01-01

## 2020-01-01 RX ORDER — POLYETHYLENE GLYCOL 3350 17 G/17G
17 POWDER, FOR SOLUTION ORAL DAILY
Status: DISCONTINUED | OUTPATIENT
Start: 2020-01-01 | End: 2020-01-01

## 2020-01-01 RX ORDER — FLUOXETINE 10 MG/1
20 TABLET, FILM COATED ORAL DAILY
Status: DISCONTINUED | OUTPATIENT
Start: 2020-01-01 | End: 2020-01-01

## 2020-01-01 RX ORDER — ONDANSETRON 2 MG/ML
4 INJECTION INTRAMUSCULAR; INTRAVENOUS EVERY 6 HOURS PRN
Status: DISCONTINUED | OUTPATIENT
Start: 2020-01-01 | End: 2020-01-01

## 2020-01-01 RX ORDER — OXYCODONE HYDROCHLORIDE 5 MG/1
10 TABLET ORAL EVERY 4 HOURS PRN
Status: DISCONTINUED | OUTPATIENT
Start: 2020-01-01 | End: 2020-01-01

## 2020-01-01 RX ORDER — HYDROMORPHONE HYDROCHLORIDE 2 MG/1
2 TABLET ORAL EVERY 4 HOURS PRN
Status: DISCONTINUED | OUTPATIENT
Start: 2020-01-01 | End: 2020-01-01

## 2020-01-01 RX ORDER — MORPHINE SULFATE 4 MG/ML
2 INJECTION, SOLUTION INTRAMUSCULAR; INTRAVENOUS EVERY 2 HOUR PRN
Status: DISCONTINUED | OUTPATIENT
Start: 2020-01-01 | End: 2020-01-01

## 2020-01-01 RX ORDER — ACETAMINOPHEN 500 MG
1000 TABLET ORAL ONCE
Status: DISCONTINUED | OUTPATIENT
Start: 2020-01-01 | End: 2020-01-01 | Stop reason: HOSPADM

## 2020-01-01 RX ORDER — METOCLOPRAMIDE HYDROCHLORIDE 5 MG/ML
10 INJECTION INTRAMUSCULAR; INTRAVENOUS AS NEEDED
Status: DISCONTINUED | OUTPATIENT
Start: 2020-01-01 | End: 2020-01-01

## 2020-01-01 RX ORDER — HYDROCODONE BITARTRATE AND ACETAMINOPHEN 10; 325 MG/1; MG/1
1-2 TABLET ORAL EVERY 4 HOURS PRN
COMMUNITY

## 2020-01-01 RX ORDER — FENTANYL 12 UG/H
1 PATCH TRANSDERMAL
Qty: 10 PATCH | Refills: 0 | Status: SHIPPED | OUTPATIENT
Start: 2020-01-01 | End: 2020-01-01 | Stop reason: ALTCHOICE

## 2020-01-01 RX ORDER — ACETAMINOPHEN 500 MG
1000 TABLET ORAL ONCE AS NEEDED
Status: DISCONTINUED | OUTPATIENT
Start: 2020-01-01 | End: 2020-01-01

## 2020-01-01 RX ORDER — DULOXETIN HYDROCHLORIDE 20 MG/1
CAPSULE, DELAYED RELEASE ORAL
COMMUNITY
Start: 2020-01-01

## 2020-01-01 RX ORDER — METOPROLOL SUCCINATE 25 MG/1
25 TABLET, EXTENDED RELEASE ORAL
Status: DISCONTINUED | OUTPATIENT
Start: 2020-01-01 | End: 2020-01-01

## 2020-01-01 RX ORDER — LEVOTHYROXINE SODIUM 0.07 MG/1
75 TABLET ORAL
Status: DISCONTINUED | OUTPATIENT
Start: 2020-01-01 | End: 2020-01-01

## 2020-01-01 RX ORDER — POTASSIUM CHLORIDE 750 MG/1
20 TABLET, FILM COATED, EXTENDED RELEASE ORAL DAILY
Status: SHIPPED | COMMUNITY
Start: 2020-01-01

## 2020-01-01 RX ORDER — METOCLOPRAMIDE HYDROCHLORIDE 5 MG/ML
10 INJECTION INTRAMUSCULAR; INTRAVENOUS ONCE
Status: COMPLETED | OUTPATIENT
Start: 2020-01-01 | End: 2020-01-01

## 2020-01-01 RX ORDER — DEXAMETHASONE SODIUM PHOSPHATE 4 MG/ML
VIAL (ML) INJECTION
Status: COMPLETED
Start: 2020-01-01 | End: 2020-01-01

## 2020-01-01 RX ORDER — DIPHENHYDRAMINE HYDROCHLORIDE 50 MG/ML
12.5 INJECTION INTRAMUSCULAR; INTRAVENOUS EVERY 4 HOURS PRN
Status: DISCONTINUED | OUTPATIENT
Start: 2020-01-01 | End: 2020-01-01

## 2020-01-01 RX ORDER — GABAPENTIN 100 MG/1
CAPSULE ORAL
COMMUNITY
Start: 2020-01-01 | End: 2020-01-01 | Stop reason: DRUGHIGH

## 2020-01-01 RX ORDER — ALCOHOL 0.98 ML/ML
INJECTION INTRASPINAL
Status: COMPLETED
Start: 2020-01-01 | End: 2020-01-01

## 2020-01-01 RX ORDER — POTASSIUM CHLORIDE 20 MEQ/1
40 TABLET, EXTENDED RELEASE ORAL EVERY 4 HOURS
Status: COMPLETED | OUTPATIENT
Start: 2020-01-01 | End: 2020-01-01

## 2020-01-01 RX ORDER — LIDOCAINE HYDROCHLORIDE 10 MG/ML
INJECTION, SOLUTION EPIDURAL; INFILTRATION; INTRACAUDAL; PERINEURAL AS NEEDED
Status: DISCONTINUED | OUTPATIENT
Start: 2020-01-01 | End: 2020-01-01 | Stop reason: SURG

## 2020-01-01 RX ORDER — RIVAROXABAN 10 MG/1
TABLET, FILM COATED ORAL
COMMUNITY
Start: 2020-01-01

## 2020-01-01 RX ORDER — METOCLOPRAMIDE 5 MG/1
5 TABLET ORAL 4 TIMES DAILY PRN
Status: DISCONTINUED | OUTPATIENT
Start: 2020-01-01 | End: 2020-01-01

## 2020-01-01 RX ORDER — LORAZEPAM 0.5 MG/1
0.25 TABLET ORAL EVERY 6 HOURS PRN
Status: DISCONTINUED | OUTPATIENT
Start: 2020-01-01 | End: 2020-01-01

## 2020-01-01 RX ORDER — MORPHINE SULFATE 4 MG/ML
2 INJECTION, SOLUTION INTRAMUSCULAR; INTRAVENOUS ONCE
Status: COMPLETED | OUTPATIENT
Start: 2020-01-01 | End: 2020-01-01

## 2020-01-01 RX ORDER — HYDROCODONE BITARTRATE AND ACETAMINOPHEN 5; 325 MG/1; MG/1
2 TABLET ORAL AS NEEDED
Status: COMPLETED | OUTPATIENT
Start: 2020-01-01 | End: 2020-01-01

## 2020-01-01 RX ORDER — METOCLOPRAMIDE HYDROCHLORIDE 5 MG/ML
INJECTION INTRAMUSCULAR; INTRAVENOUS
Status: COMPLETED
Start: 2020-01-01 | End: 2020-01-01

## 2020-01-01 RX ORDER — HYDROMORPHONE HYDROCHLORIDE 1 MG/ML
0.2 INJECTION, SOLUTION INTRAMUSCULAR; INTRAVENOUS; SUBCUTANEOUS EVERY 2 HOUR PRN
Status: DISCONTINUED | OUTPATIENT
Start: 2020-01-01 | End: 2020-01-01

## 2020-01-01 RX ORDER — MIDAZOLAM HYDROCHLORIDE 1 MG/ML
INJECTION INTRAMUSCULAR; INTRAVENOUS
Status: COMPLETED
Start: 2020-01-01 | End: 2020-01-01

## 2020-01-01 RX ORDER — HYDROMORPHONE HYDROCHLORIDE 1 MG/ML
0.4 INJECTION, SOLUTION INTRAMUSCULAR; INTRAVENOUS; SUBCUTANEOUS EVERY 5 MIN PRN
Status: DISCONTINUED | OUTPATIENT
Start: 2020-01-01 | End: 2020-01-01

## 2020-01-01 RX ORDER — METOCLOPRAMIDE HYDROCHLORIDE 5 MG/ML
5 INJECTION INTRAMUSCULAR; INTRAVENOUS EVERY 8 HOURS PRN
Status: DISCONTINUED | OUTPATIENT
Start: 2020-01-01 | End: 2020-01-01

## 2020-01-01 RX ORDER — LISINOPRIL AND HYDROCHLOROTHIAZIDE 12.5; 1 MG/1; MG/1
1 TABLET ORAL DAILY
Status: DISCONTINUED | OUTPATIENT
Start: 2020-01-01 | End: 2020-01-01

## 2020-01-01 RX ORDER — HYDROCODONE BITARTRATE AND ACETAMINOPHEN 5; 325 MG/1; MG/1
1 TABLET ORAL AS NEEDED
Status: COMPLETED | OUTPATIENT
Start: 2020-01-01 | End: 2020-01-01

## 2020-01-01 RX ORDER — HYDROMORPHONE HYDROCHLORIDE 1 MG/ML
0.1 INJECTION, SOLUTION INTRAMUSCULAR; INTRAVENOUS; SUBCUTANEOUS EVERY 2 HOUR PRN
Status: DISCONTINUED | OUTPATIENT
Start: 2020-01-01 | End: 2020-01-01

## 2020-01-01 RX ORDER — HYDROMORPHONE HYDROCHLORIDE 1 MG/ML
1 INJECTION, SOLUTION INTRAMUSCULAR; INTRAVENOUS; SUBCUTANEOUS EVERY 2 HOUR PRN
Status: DISCONTINUED | OUTPATIENT
Start: 2020-01-01 | End: 2020-01-01

## 2020-01-01 RX ORDER — BUPIVACAINE HYDROCHLORIDE 5 MG/ML
INJECTION, SOLUTION EPIDURAL; INTRACAUDAL
Status: DISPENSED
Start: 2020-01-01 | End: 2020-01-01

## 2020-01-01 RX ORDER — HYDROCODONE BITARTRATE AND ACETAMINOPHEN 10; 325 MG/1; MG/1
1-2 TABLET ORAL EVERY 4 HOURS PRN
Qty: 240 TABLET | Refills: 0 | Status: SHIPPED | OUTPATIENT
Start: 2020-01-01 | End: 2020-01-01

## 2020-01-01 RX ORDER — HYDROMORPHONE HYDROCHLORIDE 1 MG/ML
INJECTION, SOLUTION INTRAMUSCULAR; INTRAVENOUS; SUBCUTANEOUS
Status: COMPLETED
Start: 2020-01-01 | End: 2020-01-01

## 2020-01-01 RX ORDER — METHYLPREDNISOLONE ACETATE 80 MG/ML
INJECTION, SUSPENSION INTRA-ARTICULAR; INTRALESIONAL; INTRAMUSCULAR; SOFT TISSUE
Status: DISPENSED
Start: 2020-01-01 | End: 2020-01-01

## 2020-01-01 RX ORDER — ONDANSETRON 2 MG/ML
INJECTION INTRAMUSCULAR; INTRAVENOUS
Status: COMPLETED
Start: 2020-01-01 | End: 2020-01-01

## 2020-01-01 RX ORDER — LATANOPROST 50 UG/ML
1 SOLUTION/ DROPS OPHTHALMIC NIGHTLY
Status: DISCONTINUED | OUTPATIENT
Start: 2020-01-01 | End: 2020-01-01

## 2020-01-01 RX ORDER — ENOXAPARIN SODIUM 100 MG/ML
40 INJECTION SUBCUTANEOUS DAILY
Status: DISCONTINUED | OUTPATIENT
Start: 2020-01-01 | End: 2020-01-01

## 2020-01-01 RX ORDER — OMEPRAZOLE 40 MG/1
40 CAPSULE, DELAYED RELEASE ORAL DAILY
Qty: 30 CAPSULE | Refills: 5 | Status: SHIPPED | OUTPATIENT
Start: 2020-01-01

## 2020-01-01 RX ORDER — POTASSIUM CHLORIDE 29.8 MG/ML
40 INJECTION INTRAVENOUS ONCE
Status: COMPLETED | OUTPATIENT
Start: 2020-01-01 | End: 2020-01-01

## 2020-01-01 RX ORDER — HYDROMORPHONE HYDROCHLORIDE 2 MG/1
TABLET ORAL EVERY 4 HOURS PRN
Status: DISCONTINUED | OUTPATIENT
Start: 2020-01-01 | End: 2020-01-01

## 2020-01-01 RX ORDER — ONDANSETRON 2 MG/ML
INJECTION INTRAMUSCULAR; INTRAVENOUS AS NEEDED
Status: DISCONTINUED | OUTPATIENT
Start: 2020-01-01 | End: 2020-01-01 | Stop reason: SURG

## 2020-01-01 RX ORDER — SENNA AND DOCUSATE SODIUM 50; 8.6 MG/1; MG/1
1 TABLET, FILM COATED ORAL DAILY
Status: DISCONTINUED | OUTPATIENT
Start: 2020-01-01 | End: 2020-01-01

## 2020-01-01 RX ORDER — TRAZODONE HYDROCHLORIDE 50 MG/1
TABLET ORAL
COMMUNITY
Start: 2020-01-01

## 2020-01-01 RX ORDER — DIPHENHYDRAMINE HYDROCHLORIDE 50 MG/ML
12.5 INJECTION INTRAMUSCULAR; INTRAVENOUS AS NEEDED
Status: DISCONTINUED | OUTPATIENT
Start: 2020-01-01 | End: 2020-01-01

## 2020-01-01 RX ORDER — HYDROMORPHONE HYDROCHLORIDE 2 MG/1
TABLET ORAL
COMMUNITY
Start: 2020-01-01 | End: 2020-01-01 | Stop reason: DRUGHIGH

## 2020-01-01 RX ORDER — SODIUM CHLORIDE 0.9 % (FLUSH) 0.9 %
10 SYRINGE (ML) INJECTION ONCE
Status: COMPLETED | OUTPATIENT
Start: 2020-01-01 | End: 2020-01-01

## 2020-01-01 RX ORDER — LORAZEPAM 2 MG/ML
0.5 INJECTION INTRAMUSCULAR ONCE
Status: COMPLETED | OUTPATIENT
Start: 2020-01-01 | End: 2020-01-01

## 2020-01-01 RX ORDER — MORPHINE SULFATE 4 MG/ML
3 INJECTION, SOLUTION INTRAMUSCULAR; INTRAVENOUS ONCE
Status: COMPLETED | OUTPATIENT
Start: 2020-01-01 | End: 2020-01-01

## 2020-01-01 RX ADMIN — SODIUM CHLORIDE, SODIUM LACTATE, POTASSIUM CHLORIDE, CALCIUM CHLORIDE: 600; 310; 30; 20 INJECTION, SOLUTION INTRAVENOUS at 10:09:00

## 2020-01-01 RX ADMIN — MORPHINE SULFATE 3 MG: 4 INJECTION, SOLUTION INTRAMUSCULAR; INTRAVENOUS at 14:39:00

## 2020-01-01 RX ADMIN — METOCLOPRAMIDE HYDROCHLORIDE 10 MG: 5 INJECTION INTRAMUSCULAR; INTRAVENOUS at 11:12:00

## 2020-01-01 RX ADMIN — MORPHINE SULFATE 2 MG: 4 INJECTION, SOLUTION INTRAMUSCULAR; INTRAVENOUS at 15:37:00

## 2020-01-01 RX ADMIN — ONDANSETRON 4 MG: 2 INJECTION INTRAMUSCULAR; INTRAVENOUS at 09:54:00

## 2020-01-01 RX ADMIN — CEFAZOLIN SODIUM/WATER 2 G: 2 G/20 ML SYRINGE (ML) INTRAVENOUS at 16:38:00

## 2020-01-01 RX ADMIN — MORPHINE SULFATE 2 MG: 4 INJECTION, SOLUTION INTRAMUSCULAR; INTRAVENOUS at 17:17:00

## 2020-01-01 RX ADMIN — LIDOCAINE HYDROCHLORIDE 50 MG: 10 INJECTION, SOLUTION EPIDURAL; INFILTRATION; INTRACAUDAL; PERINEURAL at 16:40:00

## 2020-01-01 RX ADMIN — SODIUM CHLORIDE 0.9 % (FLUSH) 10 ML: 0.9 % SYRINGE (ML) INJECTION at 14:49:00

## 2020-01-01 RX ADMIN — LIDOCAINE HYDROCHLORIDE 50 MG: 10 INJECTION, SOLUTION EPIDURAL; INFILTRATION; INTRACAUDAL; PERINEURAL at 09:47:00

## 2020-01-01 RX ADMIN — LORAZEPAM 0.5 MG: 2 INJECTION INTRAMUSCULAR at 11:46:00

## 2020-01-02 NOTE — TELEPHONE ENCOUNTER
Spoke with patient. She is having uncontrollable pain in her back. She has taken 3 doses of Norco today with little to no relief. Patient does have Dilaudid at home. She is going to take Dilaudid 4 mg now for pain control then every 4 hours as needed.  Toni

## 2020-01-02 NOTE — TELEPHONE ENCOUNTER
Bear Quinonez called to speak with Jamee Guy saying she is having a hard time getting a patch for her back, and the pharmacy told her to speak with 's office.  Please call

## 2020-01-02 NOTE — TELEPHONE ENCOUNTER
Patient needs to talk to Nichole Mcknight and states her pharmacy told her to call she also has questions

## 2020-01-02 NOTE — TELEPHONE ENCOUNTER
Called patient. She was able to get some pain relief with taking 2 Dilaudid tabs. Advised patient to continue taking this and can alternate with Brooke Black. Dr Ryan Ha advised, patient can take up to 3 tabs of Dilaudid every 4 hours if needed.  Will f/u on Fentan

## 2020-01-02 NOTE — PROGRESS NOTES
[facility]    PATIENT'S NAME: Hesham Lomas   ATTENDING PHYSICIAN: Yara Lazo M.D.    PATIENT ACCOUNT #: [de-identified] LOCATION: [address] [place] [address] RECORD #: [id number] YOB: 1950   DATE OF SERVICE: 12/31/2019       TANESHA carboxymethylcellulose eye drops p.r.n., Premarin vaginal cream weekly, famotidine 10 mg b.i.d., fluoxetine 20 mg daily, hydrocodone 10/325 one to two tablets q.i.d. p.r.n., hydromorphone 2 to 4 mg q.4 h. p.r.n., latanoprost ophthalmic solution 1 drop both This could be due to cutting back on the frequency of her treatment. I am giving her a fentanyl patch at 12 mcg and we can bump this up to 25 if she is not having any relief of her pain.   We talked again about management of the constipation related to the

## 2020-01-03 NOTE — TELEPHONE ENCOUNTER
Prior authorization for Fentanyl patch approved through [employer] from 12/4/19-1/2/2021. Pharmacy and patient notified.

## 2020-01-06 NOTE — TELEPHONE ENCOUNTER
Pt sates that she has not had a BM since last Tuesday. She states this has happened to her before and she had to take dulcolax oral to get her to go, she is asking if she should take dulcolax. Explained to pt she should take the dulcolax.  She is also [place]

## 2020-01-06 NOTE — TELEPHONE ENCOUNTER
Deyanira Hodgson called and was looking to speak with the nurse with some questions about constipation and the interactions of specific drugs.

## 2020-01-07 NOTE — TELEPHONE ENCOUNTER
Patient reports no bowel movement for 7 days. Patient has been taking Miralax once daily and one Dulcolax at bedtime. Patient denies pain or abdominal cramping.  Advised patient to increase fluid intake, increase Miralax to twice daily and take 2 Dulcolax t

## 2020-01-08 NOTE — TELEPHONE ENCOUNTER
Patient would like to speak to [facility] Dr. Paula Zaidi nurse she spoke with her yesterday regarding medication and constipation. Offered triage but patient wanted [facility].  Thank you

## 2020-01-08 NOTE — ED INITIAL ASSESSMENT (HPI)
72 y/o female to ED with c/o of constipation and abdominal pain. Patient reports she has not had a bowel movement for approx x1 week, patient currently takesand miralax and dulcolax with no relief. Took mag citrate this afternoon, no relief.  Patient had em

## 2020-01-08 NOTE — TELEPHONE ENCOUNTER
Wes Villanueva called and said that she has been having some issues with bowel blockage. She asked that she please get a call back.

## 2020-01-08 NOTE — ED NOTES
ED physician at bedside to perform rectal exam with this RN present. Pt tolerated exam well. Plan of care explained to pt and family, confirmed understanding.

## 2020-01-08 NOTE — TELEPHONE ENCOUNTER
Toxicities:  C2 D15 Gemcitabine/Abraxane on 12/31/2019  Pt of Dr Caitlin Casas - [facility]    Condition Update: Constipation     Constipation: Grade 2 (Pt has been constipated 7 days as of yesterday. Took 2 doses of Miralax & 2 doses of Dulcolax last night. Had a small BM this morning after a Fleets Enema. Spoke with Dr Mary Joya around 2:30 pm. She told her it was ok to increase the Miralax to three times a day. She also suggested she try taking Magnesium Citrate this afternoon. Pt vomited brown liquid. She is also having a strong, continuous pain across her right & left upper abdomen.)    Pt is en route to the [facility] ER. ETA 15 min. I called the [facility] with report.

## 2020-01-08 NOTE — TELEPHONE ENCOUNTER
Patient had some relief from constipation last night and this morning after doing a fleets enema. She is still constipated but no pain or vomiting. Per Love Christie, ok to increase Miralax to three times daily. Patient to also try Magnesium Citrate.  Instructed star

## 2020-01-08 NOTE — ED PROVIDER NOTES
Patient Seen in: [facility] Emergency Department      History   Patient presents with:  Abdomen/Flank Pain  Nausea/Vomiting/Diarrhea    Stated Complaint: r/o bowel obstruction see note    HPI    79-year-old female presents here to the emergency depa Never Used    Alcohol use: No    Drug use: No             Review of Systems    Positive for stated complaint: r/o bowel obstruction see note  Other systems are as noted in HPI. Constitutional and vital signs reviewed.       All other systems reviewed and n GFR, Non- 45 (*)     GFR, -American 52 (*)     AST 96 (*)      (*)     Alkaline Phosphatase 264 (*)     Albumin 3.3 (*)     A/G Ratio 0.9 (*)     All other components within normal limits   URINALYSIS WITH CULTURE REFLEX - MD Parth on 1/08/2020 at 18:22     Approved by: Yanni Newsome MD on 1/08/2020 at 18:27                MDM     Patient had laboratories that were sent. Received IV fluids per receive Zofran for nausea control received Dilaudid for pain control.   The patient w Pack  Take 17 g by mouth daily as needed.   Qty: 12 each Refills: 0

## 2020-01-09 NOTE — TELEPHONE ENCOUNTER
Belinda Pat called and was looking to speak with somebody in regards to constipation. She asked that she please get a call back.

## 2020-01-09 NOTE — TELEPHONE ENCOUNTER
Condition Update: Constipation    Constipation: (Pt reports she received a dose of Miralax in ER yesterday. She took a dose this morning. She has been pushing caffeine free fluids (28%) & ate oatmeal. She just had a medium to large, formed, brown BM. She feels good. No pain or cramping.)    Pt will continue to take Miralax. She will start with one dose in the morning. If she does not have a BM by early afternoon she will take a second dose. She will eat plenty of fiber and drink 64-80oz of caffeine free fluids a day. She will call with any additional questions or concerns.

## 2020-01-14 NOTE — PROGRESS NOTES
Patient presents with:   Follow - Up: apn assessment prior to treatment      Abraxane/Gemxane C3 D1- In Er 1/8 for constipation here today for treatment- Pt having continued constipation but better taking miralax but has questions about direction-denies cra

## 2020-01-14 NOTE — PROGRESS NOTES
Pt here for C3.   Arrives Via wheelchair, accompanied by Spouse           Patient reports possible pregnancy since last therapy cycle: Not Applicable    Modifications in dose or schedule: No     Frequency of blood return and site check throughout \[facility]\"" & [facility]

## 2020-01-14 NOTE — PROGRESS NOTES
Cancer Center Progress Note    Patient Name: Lalo Kim   YOB: 1950   Medical Record Number: [id number]   CSN: [id number]   Date of visit: 1/14/2020   Provider: REZA Morton  Referring Physician: No ref.  provider found couple days. Having abd discomfort and nasuea. Of note, the CT did show reduction of the pancreatic mass with necrotic appearance suggesting response. CA 19-9 however has increased. She denies any chest/respiratory complaints. Again in a w/c today.   Estee Gathers traMADol HCl 50 MG Oral Tab, TK 1 T PO Q 8 H PRF PAIN, Disp: , Rfl: 1  •  Rivaroxaban (XARELTO) 20 MG Oral Tab, Take 1 tablet (20 mg total) by mouth daily with food. , Disp: 90 tablet, Rfl: 3  •  amoxicillin 500 MG Oral Tab, tke four tabs one hour prior to Latest Ref Range: 8.5 - 10.1 mg/dL 9.7   BUN/CREAT Ratio Latest Ref Range: 10.0 - 20.0  12.7   eGFR NON-AFR.  AMERICAN Latest Ref Range: >=60  44 (L)   eGFR  Latest Ref Range: >=60  50 (L)   ANION GAP Latest Ref Range: 0 - 18 mmol/L 5   CALC Monocytes % Latest Units: % 16.9   Eosinophils % Latest Units: % 8.7   Basophils % Latest Units: % 1.0   Immature Granulocyte % Latest Units: % 0.2           Impression/Plan:  Pancreatic cancer:  CT abd/pelvis demonstrates reduction in size of pancreatic

## 2020-01-16 NOTE — TELEPHONE ENCOUNTER
Patient has a question regarding her Fentanyl Patch and want to know if she increase it, and need to discuss what it suppose to be doing.

## 2020-01-16 NOTE — TELEPHONE ENCOUNTER
Patient's Fentanyl dose was increased to 25 mcg two days ago. She dropped off her RX at the pharmacy and was told it will require a PA. She is currently on 2- 12.5 mcg patches. She asked about increasing the dose again.  Advised patient to give it until robert

## 2020-01-16 NOTE — TELEPHONE ENCOUNTER
Per Dr Lisa Aase, patient to increase Fentanyl dose to 37.5 mcg. She has the 12.5 mcg patches at home, she will place 3 patches. Still awaiting fax from pharmacy to complete PA for the 25 mcg patches.  Once approved, she will place 1- 25 mcg and 1- 12.5 mcg pa

## 2020-01-17 NOTE — TELEPHONE ENCOUNTER
Patient is seeing Dr Paul Banegas on Tuesday for stent exchange. She is asking for directions on holding the Xarelto. Advised patient to hold for 48 hours before the procedure. She can resume 24 hours after or as directed by Dr Paul Banegas.  She also requestin

## 2020-01-17 NOTE — TELEPHONE ENCOUNTER
Patient is calling to get some information from Dr. Rohan Gamez for her Surgery on 1/21/20, she have 2 questions.

## 2020-01-17 NOTE — TELEPHONE ENCOUNTER
Sunnie Denver called to speak with Gail Manzo about a prescription. She was looking to speak with her about the 2 prescriptions that are being sent in for her today. She asked that she please get a call back.

## 2020-01-17 NOTE — TELEPHONE ENCOUNTER
Jarrett Saunders is calling regarding her mother's treatment and would like to discuss this matter with Dr. Sal Harris and just missed your call.

## 2020-01-17 NOTE — TELEPHONE ENCOUNTER
Carlene Gagegreg was calling to speak with somebody about test results. She also just had a few questions about the treatment plan for her mother she asked that she get a call back.

## 2020-01-21 NOTE — ANESTHESIA POSTPROCEDURE EVALUATION
[address] Patient Status:  Hospital Outpatient Surgery   Age/Gender 71year old female MRN [id number]   Location [address] [facility] Attending Cecilia Krishna MD   Hosp Day # 0 PCP Bulmaro Hernandez

## 2020-01-21 NOTE — ANESTHESIA PREPROCEDURE EVALUATION
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]    Procedure(s):  CYSTOSCOPY LEFT URETERAL STENT EXCHANGE    Surgeon(s) and Role:     * Alyssa Mauricio MD - Primary    Pre-op vitals review Rfl: 0  Prochlorperazine Maleate (COMPAZINE) 10 mg tablet, Take 1 tablet (10 mg total) by mouth every 6 (six) hours as needed for Nausea., Disp: 30 tablet, Rfl: 3  Ondansetron HCl (ZOFRAN) 8 MG tablet, Take 1 tablet (8 mg total) by mouth every 8 (eight) ho 8/20/2019    Performed by Tran Chung MD at [facility] MAIN OR   • HYSTERECTOMY      totaql hysterectomy   • HYSTEROSCOPY     • OTHER SURGICAL HISTORY  08/15/2017    right forearm, plates/screws inserted.     • OTHER SURGICAL HISTORY  02/27/2019    Left neph

## 2020-01-21 NOTE — H&P
[facility] Urology  H&P Note    Emma Hart Patient Status:  Hospital Outpatient Surgery    1950 MRN [id number]   Location [address] PRE OP HOLDING Attending Mallory Alva MD   Hosp Day # 0 PCP Irina Sung     CC: Medications:   •  lactated ringers infusion, , Intravenous, Continuous  •  [MAR Hold] acetaminophen (TYLENOL EXTRA STRENGTH) tab 1,000 mg, 1,000 mg, Oral, Once  •  ceFAZolin sodium (ANCEF/KEFZOL) 2 GM/20ML premix IV syringe 2 g, 2 g, Intravenous, Once    R

## 2020-01-21 NOTE — OPERATIVE REPORT
[facility]   OPERATIVE REPORT           [address] [place] [address] Patient Status:  Hospital Outpatient Surgery    1950 MRN [id number]   [facility] SURGERY Attending Stephane Felix MD   Hosp Day # 0 PCP Ambrose MITCHELL patient was awakened and taken to the recovery area in stable condition.  The patient will be discharged to home and follow up in 4 months for stent change.   An Espinoza MD  1/21/2020

## 2020-01-24 NOTE — TELEPHONE ENCOUNTER
Patient is scheduled to have a procedure on Monday. Asking to have the labs for Dr Laverne Segovia done that day. Orders released from tx plan.

## 2020-01-27 NOTE — ANESTHESIA POSTPROCEDURE EVALUATION
[address] Patient Status:  Hospital Outpatient Surgery   Age/Gender 71year old female MRN [id number]   Location [address] [place] [address]. Attending Erin Herrera MD   Hosp Day # 0 [facility]       Anesthesia Post-

## 2020-01-27 NOTE — ANESTHESIA PREPROCEDURE EVALUATION
PRE-OP EVALUATION    Patient Name: Jun Crawford    Pre-op Diagnosis: Mass of pancreas [K86.89]    Procedure(s):  ENDOSCOPIC ULTRASOUND WITH FINE NEEDLE ASPIRATION    Surgeon(s) and Role:     Lynette Brunner, MD - Primary    Pre-op vitals reviewe 3  Ondansetron HCl (ZOFRAN) 8 MG tablet, Take 1 tablet (8 mg total) by mouth every 8 (eight) hours as needed for Nausea., Disp: 30 tablet, Rfl: 3  Lisinopril-hydroCHLOROthiazide 10-12.5 MG Oral Tab, Take 1 tablet by mouth daily.   , Disp: , Rfl:   FLUoxetin 02/27/2019    Left nephrostomy    • PORT, INDWELLING, IMP     • THYROIDECTOMY      partial     Social History    Tobacco Use      Smoking status: Never Smoker      Smokeless tobacco: Never Used    Alcohol use: No      Drug use: No     Available pre-op labs

## 2020-01-27 NOTE — H&P
[facility] [address] [place] [address] Department of  Gastroenterology  Update Health History :       Chemabenedict Christopher  female   Milena Tee MD     [id number]  4/24/1950 Primary Care Physician  Elvira Beverly        HPI :  Metastatic pancreatic cancer.   She is here encounter. Metoclopramide HCl 10 MG Oral Tab, Take 1 tablet (10 mg total) by mouth 4 (four) times daily as needed (nausea). , Disp: 60 tablet, Rfl: 3  HYDROcodone-acetaminophen  MG Oral Tab, Take 1-2 tablets by mouth every 4 (four) hours as needed f hour prior to dental work., Disp: 4 tablet, Rfl: 3        Review of Symptoms:  A comprehensive 10 point review of systems was completed.     /83 (BP Location: Left arm)   Pulse 80   Temp 98.4 °F (36.9 °C) (Oral)   Resp 18   Ht 5' 7\" (1.702 m)   Wt 14

## 2020-01-27 NOTE — OPERATIVE REPORT
OPERATIVE REPORT   PATIENT NAME: Alyssa Lou  MRN: [id number]  DATE OF OPERATION: 1/27/2020  PREOPERATIVE DIAGNOSIS:   1. With known pancreatic cancer. She was referred for re-biopsy of the pancreatic mass to obtain tissue for generation sequencing.

## 2020-01-28 NOTE — PROGRESS NOTES
Pt here for C3D15.   Arrives Ambulating independently, accompanied by Spouse           Patient reports possible pregnancy since last therapy cycle: Not Applicable    Modifications in dose or schedule: No     Frequency of blood return and site check througho

## 2020-01-28 NOTE — PROGRESS NOTES
Patient is here for MD f/mac and C3D15 of chemo. Patient had left ureter stent exchanged last week. Patient c/o intermittent left flank pain since stent was exchanged. Patient c/o ongoing back pain.  Prior to stent exchange, patient was using less Norco, now

## 2020-01-28 NOTE — TELEPHONE ENCOUNTER
Ruben Chavez called to speak with Dr. Russel Torres about her tumor markers. She asked that she get a call back to discuss this with him.

## 2020-01-28 NOTE — PROGRESS NOTES
[facility]    PATIENT'S NAME: Joe Jany   ATTENDING PHYSICIAN: Kenya Andres M.D.    PATIENT ACCOUNT #: [de-identified] LOCATION: [address] [place] [address] RECORD #: [id number] YOB: 1950   DATE OF SERVICE: 12/03/2019       TANESHA Unremarkable. She has pink conjunctivae, anicteric sclerae. Pharynx without lesions. LYMPHATICS:  She has no cervical, supraclavicular, or axillary adenopathy.    LUNGS:  Resonant to percussion and clear to auscultation, with no wheezing, rales, or rho

## 2020-01-29 NOTE — PROGRESS NOTES
Next generation genomic sequencing order sent in online to [facility]. Specimen [id number] collected 01/27/2020 to be tested.

## 2020-01-30 PROBLEM — R52 INTRACTABLE PAIN: Status: ACTIVE | Noted: 2020-01-01

## 2020-01-30 PROBLEM — Z51.5 PALLIATIVE CARE BY SPECIALIST: Status: ACTIVE | Noted: 2020-01-01

## 2020-01-30 NOTE — TELEPHONE ENCOUNTER
Angelica Curtis called back to say the pain has not been relieved to back, 7-8/10, she had taken dilaudid last at 11 am,  Norco at 4 am.  Had instructed her to take 2 Dilaudid every 3 hours until comfortable earlier per Dr Nydia Astudillo instructions but patient still shayy

## 2020-01-30 NOTE — TELEPHONE ENCOUNTER
Malignant neoplasm of pancreas-1/28/20-C2 D15-Gemzar/Abraxane    Chococandice Suzette called was up all night with back pain not able to get comfortable. Has had episodes like this before but able to medicate to comfort but not last night.   Took hydromorphone prior to be

## 2020-01-30 NOTE — PROGRESS NOTES
Education Record    Learner:  Patient    Disease / Diagnosis: pain management - 3mg IV morphine - patient felt great relief, was able to relax and close her eyes. Much more comfortable.      Barriers / Limitations:  Pain   Comments:    Method:  Brief focuse

## 2020-01-30 NOTE — PROGRESS NOTES
Palliative Care Consult Note     Patient Name: Dago Keys   YOB: 1950   Medical Record Number: [id number]   CSN: [id number]   Date of visit: 1/30/2020     Reason for Consult:  Symptom management     Chief Complaint/Reason for Visit: chemotherapy     Next chemo 19: gets tx every 3 weeks   • Visual impairment     glasses     Surgical History:  Past Surgical History:   Procedure Laterality Date   •      • CATARACT Right 2017   • COLONOSCOPY  2005    small adenomatous needed for Pain. 15 tablet 1   • Metoclopramide HCl 10 MG Oral Tab Take 1 tablet (10 mg total) by mouth 4 (four) times daily as needed (nausea).  60 tablet 3   • LORazepam 0.5 MG Oral Tab Take 1 tablet (0.5 mg total) by mouth every 6 (six) hours as needed ( chest pain, heart palpitations  Abdomen:  Denies constipation, diarrhea. Denies pain. Normal BM pattern  Psych:  No complaints.   Pain affecting Sleep    Palliative Performance Scale:  50%    Physical Examination:  General: Patient is alert and oriented,

## 2020-01-30 NOTE — TELEPHONE ENCOUNTER
Per Dr Amelia Coelho, patient to limit Norco to 8 per day and use Dilaudid 2 every 2 hours until comfortable with no limit per day. She verbalizes understanding and will call back if not improved.

## 2020-01-31 NOTE — PROGRESS NOTES
Patient complains of lower back pain,ice packs applied to back in addition to IV and oral pain medication with little to no relief reported. Assisted to ambulate to bathroom. Bed alarm on. Paulo closely for pain control.

## 2020-01-31 NOTE — PROGRESS NOTES
[facility]  Report of Consultation    Hazel Hart Patient Status:  Inpatient    1950 MRN [id number]   [facility] 4NW-A Attending Cyntha Leventhal, MD   Hosp Day # 1 PCP Magda Chavez     Date of Admission:  2020 CYSTOSCOPY URETEROSCOPY Left 8/20/2019    Performed by Amari Ledezma MD at [facility] MAIN OR   • ENDOSCOPIC ULTRASOUND (EUS) N/A 1/27/2020    Performed by Emma Crespo MD at [facility] ENDOSCOPY   • HYSTERECTOMY      totaql hysterectomy   • HYSTEROSCOPY     • OT (MIRALAX) powder packet 17 g, 17 g, Oral, Daily  •  Metoclopramide HCl (REGLAN) injection 5 mg, 5 mg, Intravenous, Q8H PRN  •  Senna-Docusate Sodium (SENOKOT S) 8.6-50 MG tab 1 tablet, 1 tablet, Oral, Daily  •  lisinopril 10 mg, hydrochlorothiazide (MICROZ analgesia    Assessment  Assessed pt in bed with  at bedside. Pt is asleep; wakes to voice but has difficulty staying awake. Able to answer questions but is drowsy.  Pt received Dilaudid IVP 1 mg x 2 doses earlier this morning and Dilaudid PCA was or

## 2020-01-31 NOTE — CONSULTS
[facility] Pharmacy Note:  Pain Consult    Amador Nunez is a 71year old female started on Dilaudid PCA by Dr. Galo Marcus, [address] was consulted to review medication profile and to discontinue previously ordered narcotics and sedatives.

## 2020-01-31 NOTE — PROCEDURES
[facility]  Procedure Note    Kaitlynn Conway Hailey Patient Status:  Inpatient    1950 MRN [id number]   [facility] 4NW-A Attending Rae Bustamante MD   Hosp Day # 1 PCP LEA EMERY     Procedure: Celiac nerve ablation    Pre

## 2020-01-31 NOTE — PROGRESS NOTES
[facility]  Progress Note     Alesha Hart Patient Status:  Inpatient    1950 MRN [id number]   [facility] 4NW-A Attending Beverly Saenz MD   Hosp Day # 1 PCP Aguilar Herzog     Chief Complaint: pain    S: Patient INR in the last 168 hours. No results for input(s): TROP, CK in the last 168 hours. Lab Results   Component Value Date    TSH 0.613 01/31/2020       Imaging: Imaging data reviewed in Epic.     Medications:   • Potassium Chloride ER  40 mEq Oral Q4H

## 2020-01-31 NOTE — CONSULTS
[facility]  Report of Consultation    Ana Harleenshena Hart Patient Status:  Inpatient    1950 MRN [id number]   [facility] 4NW-A Attending Sepideh Brunner MD   Hosp Day # 1 PCP Tristen Huynh     Reason for Consultation:    Int SURGICAL HISTORY  08/15/2017    right forearm, plates/screws inserted. • OTHER SURGICAL HISTORY  02/27/2019    Left nephrostomy    • PORT, INDWELLING, IMP     • THYROIDECTOMY      partial     No family history on file.    reports that she has never smoke kg (144 lb 1.6 oz)   SpO2 98%   BMI 22.51 kg/m²    General: Patient is alert and oriented x 3, in moderate distress. HEENT: EOMs intact. Anicteric sclera. Pink conjunctiva. Oropharynx is clear. Neck: No JVD. No palpable lymphadenopathy.  Neck is suppl MCH 33.4 26.0 - 34.0 pg    MCHC 34.0 31.0 - 37.0 g/dL    RDW 15.0 11.0 - 15.0 %    RDW-SD 53.9 (H) 35.1 - 46.3 fL    Neutrophil Absolute Prelim 4.93 1.50 - 7.70 x10 (3) uL    Neutrophil Absolute 4.93 1.50 - 7.70 x10(3) uL    Lymphocyte Absolute 0.92 (L) 1. Basophil Absolute 0.01 0.00 - 0.20 x10(3) uL    Immature Granulocyte Absolute 0.02 0.00 - 1.00 x10(3) uL    Neutrophil % 87.3 %    Lymphocyte % 9.8 %    Monocyte % 1.4 %    Eosinophil % 0.9 %    Basophil % 0.2 %    Immature Granulocyte % 0.4 %       Sandra

## 2020-01-31 NOTE — PROGRESS NOTES
NURSING ADMISSION NOTE      Patient admitted via Wheelchair  Oriented to room. Safety precautions initiated. Bed in low position. Call light in reach. Pt direct admit from cancer center. Rating back pain 8/10. Admission navigator completed.  Zoë

## 2020-01-31 NOTE — H&P
[facility]  History and Physical     Jalil Hart Patient Status:  Inpatient    1950 MRN [id number]   [facility] 4NW-A Attending Aileen Grant, DO   Hosp Day # 0 PCP Darshan Marina     Chief Complaint: Pain    Histo URETEROSCOPY Left 8/20/2019    Performed by Heather Munoz MD at [facility] MAIN OR   • ENDOSCOPIC ULTRASOUND (EUS) N/A 1/27/2020    Performed by Zabrina Osborn MD at [facility]. .      totaql hysterectomy   • HYSTEROSCOPY     • OTHER SURGICA Prochlorperazine Maleate (COMPAZINE) 10 mg tablet, Take 1 tablet (10 mg total) by mouth every 6 (six) hours as needed for Nausea., Disp: 30 tablet, Rfl: 3  Ondansetron HCl (ZOFRAN) 8 MG tablet, Take 1 tablet (8 mg total) by mouth every 8 (eight) hours as intact. Musculoskeletal: Moves all extremities. Extremities: No edema or cyanosis. Integument: No rashes or lesions. Psychiatric: Appropriate mood and affect.       Diagnostic Data:      Labs:  Recent Labs   Lab 01/28/20  1028 01/30/20  1836   WBC 3.6*

## 2020-01-31 NOTE — IMAGING NOTE
The Patient arrived to CT from 403 for a Celiac Ganglia Block. AN IV via Chest Port is infusing pain pump and PCA of Dilaudid.  The Patient's affect is good and conversational.  Doctor Navin Castellano consented patient for the procedure, and all questions were answ

## 2020-01-31 NOTE — DIETARY MALNUTRITION NOTE
[facility]    NUTRITION INITIAL ASSESSMENT    Pt meets moderate malnutrition criteria.     CRITERIA FOR MALNUTRITION DIAGNOSIS:  Criteria for non-severe malnutrition diagnosis: chronic illness related to energy intake less than75% for greater than 1 mo 71.1 kg (156 lb 12.8 oz)  12/17/19 : 73.9 kg (163 lb)  12/12/19 : 73.3 kg (161 lb 9.6 oz)  12/10/19 : 72.7 kg (160 lb 3.2 oz)    NUTRITION:  Diet: Regular  Oral Supplements: Goodman [place] at breakfast and Ensure High Protein at dinner    FOOD/NUTRITION

## 2020-02-01 NOTE — PROGRESS NOTES
[facility]  Progress Note     Viktoria Hart Patient Status:  Inpatient    1950 MRN [id number]   [facility] 4NW-A Attending Austen Soria MD   Hosp Day # 2 PCP Moisés Payan     Chief Complaint: pain    S: Patient ALT 62* 237*  --   --  335*   BILT 0.4 0.9  --   --  1.0   TP 6.9 6.7  --   --  6.0*       Estimated Creatinine Clearance: 45.8 mL/min (A) (based on SCr of 1.13 mg/dL (H)). No results for input(s): PTP, INR in the last 168 hours.     No results for inp

## 2020-02-01 NOTE — PROGRESS NOTES
[facility]  Progress Note    Jalil Henrikliset Hart Patient Status:  Inpatient    1950 MRN [id number]   [facility] 4NW-A Attending Jessica Jane MD   Hosp Day # 2 PCP LEA EMERY       SUBJECTIVE:    Much better.  Did not us ([facility] only)   Oral Daily     HYDROmorphone HCl **OR** HYDROmorphone HCl, HYDROmorphone HCl **OR** [DISCONTINUED] HYDROmorphone HCl, LORazepam **OR** LORazepam, Metoclopramide HCl, Metoclopramide HCl    PHYSICAL EXAM:    General: Patient is alert and oriente

## 2020-02-01 NOTE — PLAN OF CARE
Assumed care @ 0730. Patient alert, oriented x4. Patient c/o mild pain on lower back. Midback bandages clean, dry and intact. Patient able to move all extremities without difficulty, denies numbness/tingling on all extremities.   Patient's vital signs stab

## 2020-02-01 NOTE — PROGRESS NOTES
Pain Service  Pt is s/p celiac nerve block for intractable back and right flank pain r/t metastatic pancreatic cancer by Dr. Harley Carnes yesterday. Assessed pt in bed with  at bedside. Pt states she has no pain at this time.  Pt has not used oral pain

## 2020-02-01 NOTE — PLAN OF CARE
Pt. Alert and oriented x4, RA, VSS. C/o severe low back pain. Pain services consulted this am. Pt initiated on dilaudid PCA pump. Pt was very drowsy this am; PCA pump dose decreased per pain services order.  K+ replaced this am; repeat K+ same as this am at

## 2020-02-01 NOTE — DISCHARGE SUMMARY
[facility] [place]  DISCHARGE SUMMARY     Catalina Hart Patient Status:  Inpatient    1950 MRN [id number]   [facility] 4NW-A Attending Bill Tijerina MD   Hosp Day # 2 PCP Verito Queen     Date of Admission: 2020  Da (two) times daily. Refills:  0     FLUoxetine HCl 20 MG Tabs  Commonly known as:  PROZAC      Take 20 mg by mouth daily.    Refills:  0     HYDROcodone-acetaminophen  MG Tabs  Commonly known as:  NORCO      Take 1-2 tablets by mouth every 4 (four) h Maleate 10 mg tablet  Commonly known as:  COMPAZINE      Take 1 tablet (10 mg total) by mouth every 6 (six) hours as needed for Nausea. Quantity:  30 tablet  Refills:  3     REFRESH LIQUIGEL OP      Apply to eye as needed.    Refills:  0     Rivaroxaban 2 signs:   Temp:  [98.7 °F (37.1 °C)-98.9 °F (37.2 °C)] 98.9 °F (37.2 °C)  Pulse:  [84-97] 86  Resp:  [16-18] 16  BP: (575-114)/(56-99) 114/61       -----------------------------------------------------------------------------------------------  PATIENT DISCH

## 2020-02-01 NOTE — PLAN OF CARE
Alert and oriented x3. Disoriented to place. Patient is very drowsy. Will become alert at times but then becomes confused again. Has gotten out of bed multiple times throughout the night.  Pt apparently has a history of becoming confused after receiving fen

## 2020-02-11 NOTE — TELEPHONE ENCOUNTER
Sage Murillo is calling looking to speak with Bouchra Haines. She would like the numbers of the mutation that  gave her before.  Please call

## 2020-02-11 NOTE — TELEPHONE ENCOUNTER
Chris Abrams called regarding the news she got from a test. She is interested in speaking with  at his convenience about this.  Please call

## 2020-02-12 NOTE — TELEPHONE ENCOUNTER
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

## 2020-02-12 NOTE — PROGRESS NOTES
ORAL CHEMOTHERAPY EDUCATION RECORD  Learner:  Patient and Spouse  Barriers / Limitations:  None    Diagnosis:   Pancreatic cancer  Chemo Drug/Dose/Frequency:   Verzenio 200mg BID        Administration Guidelines:  With or without food     Drug / Drug Int Dietician information sheet  When to contact the Treatment Team Information Sheet  Side Effect Management [facility] Information sheet    Patient and caregiver were given ample opportunity to ask qu

## 2020-02-14 NOTE — TELEPHONE ENCOUNTER
Pancreatic Cancer-Verzenio 200 mg bid    Diarrhea - grade 1- she states she has had 3 diarrhea(loose) stool today, instructed to take Imodium as prescribed, also to push fluids, Gatorade, Pedilyte, Brat diet.   Skin-she states she had notice purple spot to

## 2020-02-17 NOTE — TELEPHONE ENCOUNTER
Vandana Celestino called regarding questions on her experimental medication. She says she was told to call with any questions regarding the medication. Please call.  Thank you

## 2020-02-17 NOTE — TELEPHONE ENCOUNTER
Gennaro Palmer called to speak with Lenoard Leyden regarding an experimental medication she is on. This is all the information given. She says she will call back.  Please call

## 2020-02-17 NOTE — PROGRESS NOTES
Nutrition screen complete as triggered by [facility] dx of metastatic pancreatic caner. Chart reviewed. Pt known to this RD from prior consultations. Noted pt triggered for MALNUTRITION during recent hospital admission.  Pt seen by inpt RD who recommende

## 2020-02-18 NOTE — TELEPHONE ENCOUNTER
Stacy Begum calling regarding questions she has regarding the experimental drugs she is taking. Was told to call with any questions.

## 2020-02-18 NOTE — TELEPHONE ENCOUNTER
Spoke with patient. Her questions was in regards to reflux. She has been taking Pepcid daily with little relief. Advised patient to take Omeprazole in the morning and take Pepcid in the evening. She is following up on Friday.  Will further assess at that ti

## 2020-02-21 NOTE — PROGRESS NOTES
Patient is here for MD f/u. Started Diane Villarreal on 2/12. Patient c/o what looks like multiple bruises on the palms of her hands and fingers. No diarrhea. No nausea or vomiting. Pain in lower back is manageable today.  Patient is weaning off Dexamethasone, curr

## 2020-02-25 NOTE — PROGRESS NOTES
[facility]    PATIENT'S NAME: Peter Ashford   ATTENDING PHYSICIAN: Beth Velázquez M.D.    PATIENT ACCOUNT #: [de-identified] LOCATION: [address] [place] [address] RECORD #: [id number] YOB: 1950   DATE OF SERVICE: 02/21/2020       TANESHA has had no nausea or vomiting. She has manageable discomfort in her lower back at the present time. She is not using MiraLAX any more since her bowel movements have been more normal on the abemaciclib.      MEDICATIONS:  Current medications include the ab function tests are normal.  Her electrolytes are largely normal.  BUN is 31, creatinine 1.30, sodium is slightly low at 133. IMPRESSION:  Metastatic pancreatic cancer:  She has exhausted all standard chemotherapy treatment options.   We are treating her

## 2020-02-26 NOTE — TELEPHONE ENCOUNTER
[address] [facility] at [phone] is calling to speak with Dr. Michelle Davila regarding her conversation with Bing Quinton Beaulieu and treatment vs. Hospice, please call Caitlin Horta.
[facility]

## 2020-03-04 NOTE — TELEPHONE ENCOUNTER
Kimberly Maza from [facility] called to update . She says that she is only taking comfort meds at this time, and is declining other meds. She says eaten in about 5 days. She says she is completely bedridden, and canceled her appointment.  She is yosi

## 2020-03-05 NOTE — TELEPHONE ENCOUNTER
Bruce Dunne at [phone] is calling to inform Dr. Swapna Lawrence that Truman Zambrano passed away 3/5/20 at 200 AM, family was present.

## 2020-03-06 ENCOUNTER — TELEPHONE (OUTPATIENT)
Dept: HEMATOLOGY/ONCOLOGY | Facility: HOSPITAL | Age: 70
End: 2020-03-06

## 2020-03-09 ENCOUNTER — TELEPHONE (OUTPATIENT)
Dept: HEMATOLOGY/ONCOLOGY | Facility: HOSPITAL | Age: 70
End: 2020-03-09

## 2020-03-09 NOTE — TELEPHONE ENCOUNTER
Called and spoke with Saranya Andrews that can bring the medication to cancer center and ask to give to RN. He states he will bring it in next day or so.

## 2020-03-09 NOTE — TELEPHONE ENCOUNTER
Son of Jenifer Fullermary called regarding 5 boxes of medication Verzenio they have that was never opened wondering if they could donate to Dr. Benson Woods

## 2020-03-29 NOTE — PROGRESS NOTES
[facility]    PATIENT'S NAME: Chrissy Handing   ATTENDING PHYSICIAN: Saurabh Gupta M.D.    PATIENT ACCOUNT #: [de-identified] LOCATION: [address] [place] [address] RECORD #: [id number] YOB: 1950   DATE OF SERVICE: 01/28/2020       TANESHA 10 mg q.i.d. p.r.n.; metoprolol succinate extended release 25 mg daily; omeprazole 40 mg daily; phenazopyridine 100 mg t.i.d. p.r.n.; potassium chloride 20 mEq daily; trazodone 50 mg nightly; and Xarelto 10 mg daily.     PHYSICAL EXAMINATION:    GENERAL:  S if she needs additional pain relief. We did talk about the possibility of a celiac axis block today as well.     Dictated By Robe Barker M.D.  d: 03/29/2020 06:52:47  t: 03/29/2020 11:42:02  [facility] [id number]  XY/

## 2021-02-26 ENCOUNTER — GENETICS ENCOUNTER (OUTPATIENT)
Dept: HEMATOLOGY/ONCOLOGY | Facility: HOSPITAL | Age: 71
End: 2021-02-26

## 2021-02-26 NOTE — PROGRESS NOTES
Tiesha sent an amended genetic test report on 2/24/2021 downgrading CDK4 c.122A>G ([id number]) to \"likely benign. \"

## 2021-06-25 NOTE — PROGRESS NOTES
CC:Patient presents with:  Chemotherapy: maintenance 5fu/lv for metastatic pancreatic every 3 weeks      HPI:   Era Rendon is a(n) 77year old female followed by Dr. Sundar Campa for metastatic pancreatic cancer.   She was diagnosed in the fall of 2016 mood    LABS          Recent Results (from the past 24 hour(s))  -CEA   Collection Time: 02/28/17 10:47 AM   Result Value Ref Range   CEA 5.5 (H) 0.5-5.0 ng/mL   -CARBOHYDRATE ANTIGEN 19-9   Collection Time: 02/28/17 10:47 AM   Result Value Ref Range   Car JAEL Cordova  2/28/2017 Yes

## 2022-06-21 NOTE — TELEPHONE ENCOUNTER
Daughter is calling to get an update on patient and to discus POC. Message forwarded to Dr Kaylyn Cunningham. Problem: Postpartum  Goal: Experiences normal postpartum course  Description:  Postpartum OB-Pregnancy care plan goal which identifies if the mother is experiencing a normal postpartum course  6/21/2022 1001 by Mally Romero RN  Outcome: Adequate for Discharge  Flowsheets (Taken 6/21/2022 0847)  Experiences Normal Postpartum Course: Monitor maternal vital signs  6/20/2022 2223 by Emily Jasso RN  Outcome: Progressing  Flowsheets (Taken 6/20/2022 2030)  Experiences Normal Postpartum Course: Monitor maternal vital signs     Problem: Pain  Goal: Verbalizes/displays adequate comfort level or baseline comfort level  6/21/2022 1001 by Mally Romero RN  Outcome: Adequate for Discharge  Flowsheets (Taken 6/20/2022 2030 by Emily Jasso RN)  Verbalizes/displays adequate comfort level or baseline comfort level:   Encourage patient to monitor pain and request assistance   Assess pain using appropriate pain scale   Administer analgesics based on type and severity of pain and evaluate response  6/20/2022 2223 by Emily Jasso RN  Outcome: Progressing  Flowsheets (Taken 6/20/2022 2030)  Verbalizes/displays adequate comfort level or baseline comfort level:   Encourage patient to monitor pain and request assistance   Assess pain using appropriate pain scale   Administer analgesics based on type and severity of pain and evaluate response     Problem: Infection - Adult  Goal: Absence of infection at discharge  6/21/2022 1001 by Mally Romero RN  Outcome: Adequate for Discharge  Flowsheets (Taken 6/21/2022 0847)  Absence of infection at discharge:   Assess and monitor for signs and symptoms of infection   Instruct and encourage patient and family to use good hand hygiene technique  6/20/2022 2223 by Emily Jasso RN  Outcome: Progressing  Flowsheets (Taken 6/20/2022 2030)  Absence of infection at discharge:   Assess and monitor for signs and symptoms of infection   Monitor lab/diagnostic results     Problem: Safety - Adult  Goal: Free from fall injury  6/21/2022 1001 by Nehemias White RN  Outcome: Adequate for Discharge  Flowsheets (Taken 6/21/2022 0847)  Free From Fall Injury: Instruct family/caregiver on patient safety  6/20/2022 2223 by Jennifer Irving RN  Outcome: Progressing  Flowsheets (Taken 6/20/2022 2030)  Free From Fall Injury: Instruct family/caregiver on patient safety     Problem: Discharge Planning  Goal: Discharge to home or other facility with appropriate resources  6/21/2022 1001 by Nehemias White RN  Outcome: Adequate for Discharge  Flowsheets (Taken 6/21/2022 0847)  Discharge to home or other facility with appropriate resources: Identify barriers to discharge with patient and caregiver  6/20/2022 2223 by Jennifer Irving RN  Outcome: Progressing  Flowsheets (Taken 6/20/2022 2030)  Discharge to home or other facility with appropriate resources: Identify barriers to discharge with patient and caregiver   Care plan reviewed with patient. Patient verbalizes understanding of the plan of care and contribute to goal setting.

## (undated) DEVICE — SOL H2O 3000ML IRRIG

## (undated) DEVICE — Device

## (undated) DEVICE — STERILE POLYISOPRENE POWDER-FREE SURGICAL GLOVES: Brand: PROTEXIS

## (undated) DEVICE — NITINOL WIRE STR 035

## (undated) DEVICE — CYSTO CDS-LF: Brand: MEDLINE INDUSTRIES, INC.

## (undated) DEVICE — KENDALL SCD EXPRESS SLEEVES, KNEE LENGTH, MEDIUM: Brand: KENDALL SCD

## (undated) DEVICE — TIGERTAIL 5F FLXTIP 70CM

## (undated) DEVICE — 3M™ TEGADERM™ TRANSPARENT FILM DRESSING, 1626W, 4 IN X 4-3/4 IN (10 CM X 12 CM), 50 EACH/CARTON, 4 CARTON/CASE: Brand: 3M™ TEGADERM™

## (undated) DEVICE — DEV BIOP 25GA STRL DISP ENDO

## (undated) DEVICE — FILTERLINE NASAL ADULT O2/CO2

## (undated) DEVICE — 1200CC GUARDIAN II: Brand: GUARDIAN

## (undated) DEVICE — 3M™ RED DOT™ MONITORING ELECTRODE WITH FOAM TAPE AND STICKY GEL, 50/BAG, 20/CASE, 72/PLT 2570: Brand: RED DOT™

## (undated) DEVICE — ENDOSCOPY PACK - LOWER: Brand: MEDLINE INDUSTRIES, INC.

## (undated) NOTE — MR AVS SNAPSHOT
After Visit Summary   3/21/2017    Tamra Hart    MRN: [id number]           Diagnoses this Visit     Malignant neoplasm of head of pancreas [facility])    -  Primary       Allergies     No Known Allergies      Your Vital Signs Were     BP Pulse Tuesday March 21, 2017     LAB:  CBC WITH DIFFERENTIAL WITH PLATELET        Tuesday March 21, 2017     LAB:  CEA        Tuesday March 21, 2017     LAB:  COMP METABOLIC PANEL (14)        Thursday March 23, 2017 2:00 PM     Appointment with [address] [place] [address] at [facility] NO 5 ((4.0 - Albumin) x 0.8 + Calcium    Note: Calculation is only valid when Albumin is less than 4.0g/dL.       Alkaline Phosphatase 147 (H)   U/L Final    AST 23  15-41  U/L Final    Alt 29  14-54  U/L Final    Bilirubin, Total 0.4  0.1-2.0  mg/dL F Summaries. If you've been to the Emergency Department or your doctor's office, you can view your past visit information in PetroDE by going to Visits < Visit Summaries. Pet[employer]DE questions? Call [phone] for help.   PetroDE is NOT to be used for urge

## (undated) NOTE — LETTER
Printed: 2020    Patient Name: Tomasa Berkowitz  : 1950   Medical Record #: [id number]    Consent to Cancer Treatment    I, Tomasa Berkowitz, understand that I have been diagnosed with pancreatic cancer.     I understand that the contact my oncologist,  or my Cancer Care Team at any time if I have questions, by calling [phone]. Additional written information will be given to me prior to start of therapy. Additionally, I will receive a copy of this consent form.     I have

## (undated) NOTE — MR AVS SNAPSHOT
After Visit Summary   5/9/2017    Millsnandini Lawhop    MRN: [id number]           Diagnoses this Visit     Malignant neoplasm of head of pancreas [facility])    -  Primary     Secondary adenocarcinoma of liver ([facility]           Allergies     No Known All discharge instructions in [employer] by going to Visits < Admission Summaries. If you've been to the Emergency Department or your doctor's office, you can view your past visit information in [employer] by going to Visits < Visit Summaries. [employer] questions?

## (undated) NOTE — MR AVS SNAPSHOT
After Visit Summary   5/30/2017    Sean Hart    MRN: [id number]           Diagnoses this Visit     Malignant neoplasm of head of pancreas [facility])    -  Primary       Allergies     No Known Allergies      Your Vital Signs Were     BP Pulse Procedure                               Abnormality         Status                     ---------                               -----------         ------                     CBC W/ DIFFERENTIAL[[id number]]          Abnormal            Final result Neutrophil Absolute 1.38  1.30-6.70  x10(3) uL Final    Lymphocyte Absolute 1.68  0.90-4.00  x10(3) uL Final    Monocyte Absolute 0.35  0.10-0.60  x10(3) uL Final    Eosinophil Absolute 0.17  0.00-0.30  x10(3) uL Final    Basophil Absolute 0.06  0.00-0.10

## (undated) NOTE — MR AVS SNAPSHOT
After Visit Summary   5/14/2018    Dilip Hart    MRN: [id number]           Visit Information     Date & Time  5/14/2018  8:30 AM Provider  JAEL Pinon Department  [facility] in [place] [address].  Phone  [phone] conditions such as allergies, colds, cough, fever, rash, sore throat, headache and pink eye. The cost for a Video Visit is currently $35.         If you receive a survey from [employer], please take a few minutes to complete it and provide feedback.  We s For more information about hours, locations or appointment options available at [facility],  visit: [email or web address] or call [phone]. ([phone])

## (undated) NOTE — MR AVS SNAPSHOT
After Visit Summary   2/7/2017    [facility]    MRN: [id number]           Diagnoses this Visit     Malignant neoplasm of head of pancreas [facility])    -  Primary       Allergies     No Known Allergies      Your Vital Signs Were     Smoking St Procedure                               Abnormality         Status                     ---------                               -----------         ------                     CBC W/ DIFFERENTIAL[[id number]]          Abnormal            Final result Neutrophil Absolute 1.02 (L) 1.30-6.70  x10(3) uL Final    Lymphocyte Absolute 1.77  0.90-4.00  x10(3) uL Final    Monocyte Absolute 0.48  0.10-0.60  x10(3) uL Final    Eosinophil Absolute 0.09  0.00-0.30  x10(3) uL Final    Basophil Absolute 0.05  0.00-0

## (undated) NOTE — MR AVS SNAPSHOT
After Visit Summary   4/11/2017    Era Rendon    MRN: [id number]           Diagnoses this Visit     Malignant neoplasm of head of pancreas [facility])    -  Primary     Secondary adenocarcinoma of liver ([facility]           Allergies     No Known Al Appointment with [address] [place] at [address] [facility] ([phone])   [address]  [address] [place] [address] [address] [place] [address] [address] can access your MyChart to more actively manage your health care and view mo

## (undated) NOTE — MR AVS SNAPSHOT
After Visit Summary   2/28/2017    Jl Wilson    MRN: [id number]           Diagnoses this Visit     Malignant neoplasm of head of pancreas [facility])    -  Primary     [facility]           Allergies     No Known Al office, you can view your past visit information in Nu3 by going to Visits < Visit Summaries. Nu[employer] questions? Call [phone] for help. Nu3 is NOT to be used for urgent needs. For medical emergencies, dial 911.

## (undated) NOTE — MR AVS SNAPSHOT
[facility] [address]  [phone]                    After Visit Summary   1/30/2020    Deja Swan    MRN: [id number]           Visit Information     Date & Time  1/30/2020  2:30 Carboxymethylcellulose Sodium (REFRESH LIQUIGEL OP) Apply to eye as needed. Metoprolol Succinate ER 25 MG Oral Tablet 24 Hr Take 25 mg by mouth daily. latanoprost 0.005 % Ophthalmic Solution Place 1 drop into both eyes daily.         Diagnoses for

## (undated) NOTE — MR AVS SNAPSHOT
After Visit Summary   6/23/2017    Nellie No    MRN: [id number]           Diagnoses this Visit     Malignant neoplasm of pancreas, unspecified location of malignancy [facility])    -  Primary       Allergies     No Known Allergies      Your Vi

## (undated) NOTE — MR AVS SNAPSHOT
After Visit Summary   1/10/2017    Kathe Doe    MRN: [id number]           Diagnoses this Visit     Malignant neoplasm of head of pancreas [facility])    -  Primary     Secondary adenocarcinoma of liver ([facility]           Allergies     No Known Al view more details from this visit by going to [email or web address]. If you've recently had a stay at the Hospital you can access your discharge instructions in [employer] by going to Visits < Admission Summaries.  If you've been to the Emergency Depar

## (undated) NOTE — LETTER
[facility] [address], [address]  Consent for Procedure/Sedation    Date:     Time:       1. I authorize the performance upon Vale Sharp the following: LEFT PERCUTANEOUS NEPHROSTOMY TUBE INSERTION AND OR CHANGE    2.  I Signature of person authorized to consent for patient:        Relationship to patient:    ________________________________    ___________________    Witness: _________________________      Date: ___________________    Printed: 3/12/2019 Total face to face

## (undated) NOTE — LETTER
Printed: 3/5/2019    Patient Name: Darion Rodriguez  : 1950   Medical Record #: [id number]    Consent to Cancer Treatment    I, Darion Rodriguez, understand that I have been diagnosed with Metastatic pancreatic cancer.    I understand questions have been answered to my satisfaction. I understand that I can contact my oncologist,Dr. Steven Sanchez  or my Cancer Care Team at any time if I have questions, by calling [facility] at [phone].    Additional written information will be gi

## (undated) NOTE — LETTER
Printed: 2019    Patient Name: Lalo Kim  : 1950   Medical Record #: [id number]    Consent to Cancer Treatment    I, Lalo Kim, understand that I have been diagnosed with Pancreatic Cancer (stage 4).     I Mireya Steven time if I have questions, by calling [phone]. Additional written information will be given to me prior to start of therapy. Additionally, I will receive a copy of this consent form.     I have read and fully understand this consent to cancer treat

## (undated) NOTE — MR AVS SNAPSHOT
After Visit Summary   6/1/2017    Rashel Beach    MRN: [id number]           Diagnoses this Visit     Malignant neoplasm of head of pancreas [facility])    -  Primary       Allergies     No Known Allergies      Your Vital Signs Were     Smoking St

## (undated) NOTE — MR AVS SNAPSHOT
After Visit Summary   1/20/2017    Sean Hart    MRN: [id number]           Diagnoses this Visit     Malignant neoplasm of head of pancreas [facility])    -  Primary       Allergies     No Known Allergies      Your Vital Signs Were     BP Pulse TASTE CHANGES, LOWERED BLOOD COUNTS MAKING YOU MORE SUSCEPTIBLE TO INFECTION, ANEMIA, BRUISING; ,FATIGUE.     3.--LEUCOVORIN--HAS MINIMAL SIDE EFFECTS. IT IS A FOLIC ACID DERIVATIVE THAT MAKES THE 5FU WORK MORE EFFECTIVELY AT [facility] THE CANCER CELLS.     4 The following orders were created for panel order CBC WITH DIFFERENTIAL WITH PLATELET.   Procedure                               Abnormality         Status                     ---------                               -----------         ------ MCV 98.0  81.0-100.0  fL Final    MCH 33.3 (H) 27.0-33.2  pg Final    MCHC 34.0  31.0-37.0  g/dL Final    RDW 13.4  11.5-16.0  % Final    RDW-SD 49.0 (H) 35.1-46.3  fL Final    Neutrophil Absolute Prelim 1.02 (L) 1.30-6.70  x10 (3) uL Final    Neutrophil

## (undated) NOTE — MR AVS SNAPSHOT
After Visit Summary   5/9/2017    Catracho Hart    MRN: [id number]           Diagnoses this Visit     Malignant neoplasm of head of pancreas ([facility]    -  Primary       Allergies     No Known Allergies      Your Vital Signs Were     BP Pulse T Procedure                               Abnormality         Status                     ---------                               -----------         ------                     CBC W/ DIFFERENTIAL[[id number]]          Abnormal            Final result Neutrophil Absolute 1.25 (L) 1.30-6.70  x10(3) uL Final    Lymphocyte Absolute 1.53  0.90-4.00  x10(3) uL Final    Monocyte Absolute 0.28  0.10-0.60  x10(3) uL Final    Eosinophil Absolute 0.16  0.00-0.30  x10(3) uL Final    Basophil Absolute 0.05  0.00-0

## (undated) NOTE — MR AVS SNAPSHOT
After Visit Summary   2/9/2017    Dago Keys    MRN: [id number]           Diagnoses this Visit     Malignant neoplasm of head of pancreas [facility])    -  Primary       Allergies     No Known Allergies      Your Vital Signs Were     Smoking St

## (undated) NOTE — MR AVS SNAPSHOT
After Visit Summary   4/11/2017    Rashel Beach    MRN: [id number]           Diagnoses this Visit     Malignant neoplasm of head of pancreas [facility])    -  Primary       Allergies     No Known Allergies      Your Vital Signs Were     Smoking S ---------                               -----------         ------                     CBC W/ DIFFERENTIAL[[id number]]          Abnormal            Final result                 Please view results for these tests on the individual orders.          Result Sum Lymphocyte Absolute 1.44  0.90-4.00  x10(3) uL Final    Monocyte Absolute 0.39  0.10-0.60  x10(3) uL Final    Eosinophil Absolute 0.09  0.00-0.30  x10(3) uL Final    Basophil Absolute 0.04  0.00-0.10  x10(3) uL Final    Immature Granulocyte Absolute 0.01

## (undated) NOTE — MR AVS SNAPSHOT
After Visit Summary   2/7/2017    Jun Yvette    MRN: [id number]           Diagnoses this Visit     Malignant neoplasm of head of pancreas [facility])    -  Primary     [facility]           Allergies     No Known All If you've recently had a stay at the Hospital you can access your discharge instructions in [employer] by going to Visits < Admission Summaries.  If you've been to the Emergency Department or your doctor's office, you can view your past visit information in My

## (undated) NOTE — MR AVS SNAPSHOT
After Visit Summary   1/22/2017    Lalo Judy    MRN: [id number]           Diagnoses this Visit     Malignant neoplasm of head of pancreas [facility])    -  Primary       Allergies     No Known Allergies      Your Vital Signs Were     Smoking S

## (undated) NOTE — MR AVS SNAPSHOT
After Visit Summary   5/30/2017    Claudeen Leeds    MRN: [id number]           Diagnoses this Visit     Malignant neoplasm of head of pancreas [facility])    -  Primary     Secondary adenocarcinoma of liver ([facility]           Allergies     No Known Al If you've recently had a stay at the Hospital you can access your discharge instructions in [employer] by going to Visits < Admission Summaries.  If you've been to the Emergency Department or your doctor's office, you can view your past visit information in My

## (undated) NOTE — MR AVS SNAPSHOT
After Visit Summary   6/23/2017    Jalil Hart    MRN: [id number]           Diagnoses this Visit     Malignant neoplasm of head of pancreas [facility])    -  Primary       Allergies     No Known Allergies      Your Vital Signs Were     BP Pulse CBC W/ DIFFERENTIAL[[id number]]                              Final result                 Please view results for these tests on the individual orders.          Result Summary for COMP METABOLIC PANEL (14)      Component Results     Component Value Flag Ref Eosinophil Absolute 0.25  0.00-0.30  x10(3) uL Final    Basophil Absolute 0.05  0.00-0.10  x10(3) uL Final    Immature Granulocyte Absolute 0.01  0.00-1.00  x10(3) uL Final    Neutrophil % 47.0   % Final    Lymphocyte % 36.5   % Final    Monocyte % 8.9

## (undated) NOTE — MR AVS SNAPSHOT
After Visit Summary   3/21/2017    Smiley Farmer    MRN: [id number]           Diagnoses this Visit     Malignant neoplasm of head of pancreas [facility])    -  Primary     Secondary adenocarcinoma of liver ([facility]           Allergies     No Known Al Summaries. If you've been to the Emergency Department or your doctor's office, you can view your past visit information in [employer] by going to Visits < Visit Summaries. [employer] questions? Call [phone] for help.   [employer] is NOT to be used for urge

## (undated) NOTE — MR AVS SNAPSHOT
After Visit Summary   3/23/2017    Rashel Beach    MRN: [id number]           Diagnoses this Visit     Malignant neoplasm of head of pancreas [facility])    -  Primary       Allergies     No Known Allergies      Your Vital Signs Were     Smoking S

## (undated) NOTE — MR AVS SNAPSHOT
After Visit Summary   4/25/2017    Aleks Parekh    MRN: [id number]           Diagnoses this Visit     Malignant neoplasm of head of pancreas [facility])    -  Primary     Secondary adenocarcinoma of liver [facility]           Allergies     No Known Al

## (undated) NOTE — LETTER
[facility] [address], [address]  Consent for Procedure/Sedation    Date:     Time:       1.  I authorize the performance upon Tomasa Berkowitz the following:  LEFT NEPHROSTOMY TUBE EVALUATION /POSSIBLE EXCHANGE    2. I Sofie Flores ________________________________    ___________________    Witness: _________________________      Date: ___________________    Printed: 3/14/2019   3:36 PM  Patient Name: Lalo Kim        : 1950       Medical Record #: [id number]